# Patient Record
Sex: FEMALE | Race: WHITE | Employment: OTHER | URBAN - METROPOLITAN AREA
[De-identification: names, ages, dates, MRNs, and addresses within clinical notes are randomized per-mention and may not be internally consistent; named-entity substitution may affect disease eponyms.]

---

## 2019-04-01 ENCOUNTER — APPOINTMENT (OUTPATIENT)
Dept: CT IMAGING | Age: 64
End: 2019-04-01
Payer: COMMERCIAL

## 2019-04-01 ENCOUNTER — HOSPITAL ENCOUNTER (EMERGENCY)
Age: 64
Discharge: HOME | End: 2019-04-01
Attending: EMERGENCY MEDICINE
Payer: COMMERCIAL

## 2019-04-01 ENCOUNTER — APPOINTMENT (OUTPATIENT)
Dept: GENERAL RADIOLOGY | Age: 64
End: 2019-04-01
Payer: COMMERCIAL

## 2019-04-01 VITALS
RESPIRATION RATE: 18 BRPM | SYSTOLIC BLOOD PRESSURE: 127 MMHG | BODY MASS INDEX: 21.05 KG/M2 | TEMPERATURE: 98.3 F | OXYGEN SATURATION: 97 % | DIASTOLIC BLOOD PRESSURE: 72 MMHG | HEART RATE: 98 BPM | WEIGHT: 131 LBS | HEIGHT: 66 IN

## 2019-04-01 DIAGNOSIS — S52.601A CLOSED FRACTURE OF DISTAL ENDS OF RIGHT RADIUS AND ULNA, INITIAL ENCOUNTER: ICD-10-CM
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
## 2019-04-01 DIAGNOSIS — S09.90XA CLOSED HEAD INJURY, INITIAL ENCOUNTER: ICD-10-CM

## 2019-04-01 DIAGNOSIS — S52.501A CLOSED FRACTURE OF DISTAL ENDS OF RIGHT RADIUS AND ULNA, INITIAL ENCOUNTER: ICD-10-CM

## 2019-04-01 PROCEDURE — 99284 EMERGENCY DEPT VISIT MOD MDM: CPT

## 2019-04-01 PROCEDURE — 73110 X-RAY EXAM OF WRIST: CPT

## 2019-04-01 PROCEDURE — 70450 CT HEAD/BRAIN W/O DYE: CPT

## 2019-04-01 PROCEDURE — 29125 APPL SHORT ARM SPLINT STATIC: CPT

## 2019-04-01 PROCEDURE — 6370000000 HC RX 637 (ALT 250 FOR IP): Performed by: EMERGENCY MEDICINE

## 2019-04-01 RX ORDER — AMITRIPTYLINE HYDROCHLORIDE 25 MG/1
TABLET, FILM COATED ORAL
COMMUNITY

## 2019-04-01 RX ORDER — CHLORAL HYDRATE 500 MG
CAPSULE ORAL
COMMUNITY

## 2019-04-01 RX ORDER — HYDROCODONE BITARTRATE AND ACETAMINOPHEN 5; 325 MG/1; MG/1
1 TABLET ORAL EVERY 6 HOURS PRN
Qty: 15 TABLET | Refills: 0 | Status: SHIPPED | OUTPATIENT
Start: 2019-04-01 | End: 2019-04-06

## 2019-04-01 RX ORDER — UBIDECARENONE 75 MG
CAPSULE ORAL
COMMUNITY

## 2019-04-01 RX ORDER — HYDROCODONE BITARTRATE AND ACETAMINOPHEN 5; 325 MG/1; MG/1
1 TABLET ORAL ONCE
Status: COMPLETED | OUTPATIENT
Start: 2019-04-01 | End: 2019-04-01

## 2019-04-01 RX ORDER — BUPROPION HYDROCHLORIDE 300 MG/1
TABLET ORAL
COMMUNITY

## 2019-04-01 RX ORDER — VENLAFAXINE HYDROCHLORIDE 150 MG/1
CAPSULE, EXTENDED RELEASE ORAL
COMMUNITY

## 2019-04-01 RX ORDER — DOCUSATE SODIUM 100 MG/1
CAPSULE, LIQUID FILLED ORAL
COMMUNITY

## 2019-04-01 RX ADMIN — HYDROCODONE BITARTRATE AND ACETAMINOPHEN 1 TABLET: 5; 325 TABLET ORAL at 10:19

## 2019-04-01 SDOH — HEALTH STABILITY: MENTAL HEALTH: HOW OFTEN DO YOU HAVE A DRINK CONTAINING ALCOHOL?: NEVER

## 2019-04-01 ASSESSMENT — PAIN DESCRIPTION - LOCATION
LOCATION: HAND
LOCATION: HEAD;WRIST

## 2019-04-01 ASSESSMENT — ENCOUNTER SYMPTOMS
VOICE CHANGE: 0
ABDOMINAL PAIN: 0
EYE PAIN: 0
CHOKING: 0
SINUS PRESSURE: 0
CHEST TIGHTNESS: 0
FACIAL SWELLING: 0
STRIDOR: 0
EYE REDNESS: 0
WHEEZING: 0
BLOOD IN STOOL: 0
EYE DISCHARGE: 0
DIARRHEA: 0
BACK PAIN: 0
VOMITING: 0
SHORTNESS OF BREATH: 0
CONSTIPATION: 0
COUGH: 0
TROUBLE SWALLOWING: 0
SORE THROAT: 0

## 2019-04-01 ASSESSMENT — PAIN SCALES - GENERAL
PAINLEVEL_OUTOF10: 9
PAINLEVEL_OUTOF10: 9
PAINLEVEL_OUTOF10: 4

## 2019-04-01 ASSESSMENT — PAIN DESCRIPTION - PAIN TYPE
TYPE: ACUTE PAIN
TYPE: ACUTE PAIN

## 2019-04-01 ASSESSMENT — PAIN DESCRIPTION - ONSET: ONSET: ON-GOING

## 2019-04-01 ASSESSMENT — PAIN DESCRIPTION - ORIENTATION: ORIENTATION: RIGHT

## 2019-04-01 ASSESSMENT — PAIN DESCRIPTION - DESCRIPTORS: DESCRIPTORS: CONSTANT;ACHING;THROBBING

## 2019-04-01 NOTE — ED PROVIDER NOTES
[facility] ED  eMERGENCY dEPARTMENT eNCOUnter      Pt Name: Mami Rodney  MRN: [id number]  [place] 1955  Date of evaluation: 4/1/2019  Provider: Dia Banegas MD    [address] [place] [address]       Chief Complaint   Patient presents with    Fall     Feel Saturday night in the shower, head injury and right wrist injury, no loc         HISTORY OF PRESENT ILLNESS   (Location/Symptom, Timing/Onset,Context/Setting, Quality, Duration, Modifying Factors, Severity)  Note limiting factors. Mami Rodney is a 58 y.o. 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 REVIEW OF SYSTEMS    (2-9 systems for level 4, 10 or more for level 5)     Review of Systems   Constitutional: Negative. Negative for activity change and fever. HENT: Negative for congestion, drooling, facial swelling, mouth sores, nosebleeds, sinus pressure, sore throat, trouble swallowing and voice change. Eyes: Negative for pain, discharge, redness and visual disturbance. Respiratory: Negative for cough, choking, chest tightness, shortness of breath, wheezing and stridor. Cardiovascular: Negative for chest pain, palpitations and leg swelling. Gastrointestinal: Negative for abdominal pain, blood in stool, constipation, diarrhea and vomiting. Endocrine: Negative for cold intolerance, polyphagia and polyuria. Genitourinary: Negative for dysuria, flank pain, frequency, genital sores and urgency. Musculoskeletal: Positive for arthralgias and joint swelling. Negative for back pain, neck pain and neck stiffness. Skin: Negative for pallor and rash. Neurological: Positive for headaches. Negative for tremors, seizures, syncope, weakness and numbness. Hematological: Negative for adenopathy. Does not bruise/bleed easily. Psychiatric/Behavioral: Negative for agitation, behavioral problems, hallucinations and sleep disturbance. The patient is not hyperactive. All other systems reviewed and are negative. Except as noted above the remainder of the review of systems was reviewed and negative. PAST MEDICAL HISTORY     Past Medical History:   Diagnosis Date    Anemia     Depression     Headache     Hypertension     Osteoarthritis          SURGICALHISTORY       Past Surgical History:   Procedure Laterality Date    HYSTERECTOMY      RECTAL SURGERY           CURRENT MEDICATIONS       Previous Medications    AMITRIPTYLINE (ELAVIL) 25 MG TABLET        BUPROPION (WELLBUTRIN XL) 300 MG EXTENDED RELEASE TABLET        CALCIUM CARBONATE-VIT D-MIN (CALCIUM 1200 PO)        DOCUSATE SODIUM (STOOL SOFTENER) 100 MG CAPSULE        IRON COMBINATIONS (IRON COMPLEX PO)        MULTIPLE VITAMIN (MULTIVITAMINS PO)        OMEGA-3 1000 MG CAPS        VENLAFAXINE (EFFEXOR XR) 150 MG EXTENDED RELEASE CAPSULE        VITAMIN B-12 (CYANOCOBALAMIN) 100 MCG TABLET           ALLERGIES     Remeron [mirtazapine]    FAMILY HISTORY     History reviewed. No pertinent family history.        SOCIAL HISTORY       Social History     Socioeconomic History    Marital status:      Spouse name: None    Number of children: None    Years of education: None    Highest education level: None   Occupational History    None   Social Needs    Financial resource strain: None    Food insecurity:     Worry: None     Inability: None    Transportation needs:     Medical: None     Non-medical: None   Tobacco Use    Smoking status: Never Smoker    Smokeless tobacco: Never Used   Substance and Sexual Activity    Alcohol use: Never     Frequency: Never    Drug use: Never    Sexual activity: None   Lifestyle    Physical activity:     Days per week: None     Minutes per session: None    Stress: None   Relationships    Social connections:     Talks on phone: None     Gets together: None     Attends Quaker service: None     Active member of club or organization: None     Attends meetings of clubs or organizations: None     Relationship status: None    Intimate partner violence:     Fear of current or ex partner: None     Emotionally abused: None     Physically abused: None     Forced sexual activity: None   Other Topics Concern    None   Social History Narrative    None       SCREENINGS      @FLOW([id number])@      PHYSICAL EXAM    (up to 7 for level 4, 8 or more for level 5)     ED Triage Vitals [04/01/19 1001]   BP Temp Temp Source Pulse Resp SpO2 Height Weight   127/72 98.3 °F (36.8 °C) Oral 98 18 97 % 5' 6\" (1.676 m) 131 lb (59.4 kg)       Physical Exam   Constitutional: She is oriented to person, place, and time. She appears well-developed. Active alert cooperative patient slightly uncomfortable because of the face swelling ambulatory otherwise   HENT:   Head: Normocephalic. Right Ear: External ear normal.   Left Ear: External ear normal.   Mouth/Throat: Oropharynx is clear and moist.   Attention to the scalp patient has no hematoma no bruises or laceration to the scalp but patient has minimal tenderness on palpation to the occiput area   Eyes: Pupils are equal, round, and reactive to light. Conjunctivae and EOM are normal.   Neck: Normal range of motion. Neck supple. No JVD present. No tracheal deviation present. No thyromegaly present.    Attention  to the neck patient has excellent range of motion of the neck no hematoma no bruises Cardiovascular: Normal rate and normal heart sounds. Exam reveals no gallop. No murmur heard. Pulmonary/Chest: Breath sounds normal. No respiratory distress. She has no wheezes. Abdominal: Soft. Bowel sounds are normal. She exhibits no mass. There is no rebound. Musculoskeletal: Normal range of motion. She exhibits no edema or tenderness. Lymphadenopathy:     She has no cervical adenopathy. Neurological: She is alert and oriented to person, place, and time. No cranial nerve deficit. She exhibits normal muscle tone. Skin: Skin is warm. No rash noted. No erythema. Psychiatric: Her behavior is normal. Thought content normal.   Nursing note and vitals reviewed. DIAGNOSTIC RESULTS     EKG: All EKG's are interpreted by the Emergency Department Physician who either signs or Co-signsthis chart in the absence of a cardiologist.        RADIOLOGY:   Jere Bounds such as CT, Ultrasound and MRI are read by the radiologist. Darlyn Feldman radiographic images are visualized and preliminarily interpreted by the emergency physician with the below findings:        Interpretation per the Radiologist below, if available at the time ofthis note:    CT Head WO Contrast   Final Result   No acute hemorrhage or extra-axial fluid collection seen on this examination. All CT scans at this facility use dose modulation, iterative reconstruction, and/or weight based dosing when appropriate to reduce radiation dose to as low as reasonably achievable. XR WRIST RIGHT (MIN 3 VIEWS)    (Results Pending)         ED BEDSIDE ULTRASOUND:   Performed by ED Physician - none    LABS:  Labs Reviewed - No data to display    All other labs were within normal range or not returned as of this dictation.     EMERGENCY DEPARTMENT COURSE and DIFFERENTIAL DIAGNOSIS/MDM:   Vitals:    Vitals:    04/01/19 1001   BP: 127/72   Pulse: 98   Resp: 18   Temp: 98.3 °F (36.8 °C)   TempSrc: Oral   SpO2: 97%   Weight: 131 lb (59.4 kg) Height: 5' 6\" (1.676 m)           MDM    CRITICAL CARE TIME   Total Critical Care time was minutes, excluding separately reportableprocedures. There was a high probability of clinicallysignificant/life threatening deterioration in the patient's condition which required my urgent intervention. CONSULTS:  None    PROCEDURES:  Unless otherwise noted below, none     Procedures    FINAL IMPRESSION      1. Fall, initial encounter    2. Closed head injury, initial encounter    3. Closed fracture of distal ends of right radius and ulna, initial encounter          DISPOSITION/PLAN   DISPOSITION Decision To Discharge 04/01/2019 11:12:44 AM      PATIENT REFERRED TO:  Morgan Boland MD  [place] [id number]    In 1 week      Zaynab Garcia MD  [address] [facility] [address] [phone]    In 2 days        DISCHARGE MEDICATIONS:  New Prescriptions    HYDROCODONE-ACETAMINOPHEN (NORCO) 5-325 MG PER TABLET    Take 1 tablet by mouth every 6 hours as needed for Pain for up to 5 days.           (Please note that portions of this note were completed with a voice recognition program.  Efforts were made to edit the dictations but occasionally words are mis-transcribed.)    Luigi Bess MD (electronically signed)  Attending Emergency Physician       Luigi Bess MD  04/01/19 2197

## 2023-02-14 NOTE — ED TRIAGE NOTES
Pt c/o pain to back of head, lower right side and right wrist after falling in the shower on Saturday night. Pt c/o HA and pain to right wrist.  Pain and swelling to right wrist, unable to bend a wrist, able to move fingers, cap refill brisk. No loc when she fell. Nothing taken for pain today. Tazorac Pregnancy And Lactation Text: This medication is not safe during pregnancy. It is unknown if this medication is excreted in breast milk. Minocycline Counseling: Patient advised regarding possible photosensitivity and discoloration of the teeth, skin, lips, tongue and gums.  Patient instructed to avoid sunlight, if possible.  When exposed to sunlight, patients should wear protective clothing, sunglasses, and sunscreen.  The patient was instructed to call the office immediately if the following severe adverse effects occur:  hearing changes, easy bruising/bleeding, severe headache, or vision changes.  The patient verbalized understanding of the proper use and possible adverse effects of minocycline.  All of the patient's questions and concerns were addressed. Bactrim Counseling:  I discussed with the patient the risks of sulfa antibiotics including but not limited to GI upset, allergic reaction, drug rash, diarrhea, dizziness, photosensitivity, and yeast infections.  Rarely, more serious reactions can occur including but not limited to aplastic anemia, agranulocytosis, methemoglobinemia, blood dyscrasias, liver or kidney failure, lung infiltrates or desquamative/blistering drug rashes. Bactrim Pregnancy And Lactation Text: This medication is Pregnancy Category D and is known to cause fetal risk.  It is also excreted in breast milk. Topical Clindamycin Pregnancy And Lactation Text: This medication is Pregnancy Category B and is considered safe during pregnancy. It is unknown if it is excreted in breast milk. Sarecycline Counseling: Patient advised regarding possible photosensitivity and discoloration of the teeth, skin, lips, tongue and gums.  Patient instructed to avoid sunlight, if possible.  When exposed to sunlight, patients should wear protective clothing, sunglasses, and sunscreen.  The patient was instructed to call the office immediately if the following severe adverse effects occur:  hearing changes, easy bruising/bleeding, severe headache, or vision changes.  The patient verbalized understanding of the proper use and possible adverse effects of sarecycline.  All of the patient's questions and concerns were addressed. Detail Level: Detailed Isotretinoin Counseling: Patient should get monthly blood tests, not donate blood, not drive at night if vision affected, not share medication, and not undergo elective surgery for 6 months after tx completed. Side effects reviewed, pt to contact office should one occur. High Dose Vitamin A Counseling: Side effects reviewed, pt to contact office should one occur. Topical Retinoid Pregnancy And Lactation Text: This medication is Pregnancy Category C. It is unknown if this medication is excreted in breast milk. Use Enhanced Medication Counseling?: No Azithromycin Counseling:  I discussed with the patient the risks of azithromycin including but not limited to GI upset, allergic reaction, drug rash, diarrhea, and yeast infections. Dapsone Counseling: I discussed with the patient the risks of dapsone including but not limited to hemolytic anemia, agranulocytosis, rashes, methemoglobinemia, kidney failure, peripheral neuropathy, headaches, GI upset, and liver toxicity.  Patients who start dapsone require monitoring including baseline LFTs and weekly CBCs for the first month, then every month thereafter.  The patient verbalized understanding of the proper use and possible adverse effects of dapsone.  All of the patient's questions and concerns were addressed. Erythromycin Counseling:  I discussed with the patient the risks of erythromycin including but not limited to GI upset, allergic reaction, drug rash, diarrhea, increase in liver enzymes, and yeast infections. Azelaic Acid Pregnancy And Lactation Text: This medication is considered safe during pregnancy and breast feeding. Doxycycline Counseling:  Patient counseled regarding possible photosensitivity and increased risk for sunburn.  Patient instructed to avoid sunlight, if possible.  When exposed to sunlight, patients should wear protective clothing, sunglasses, and sunscreen.  The patient was instructed to call the office immediately if the following severe adverse effects occur:  hearing changes, easy bruising/bleeding, severe headache, or vision changes.  The patient verbalized understanding of the proper use and possible adverse effects of doxycycline.  All of the patient's questions and concerns were addressed. Aklief Pregnancy And Lactation Text: It is unknown if this medication is safe to use during pregnancy.  It is unknown if this medication is excreted in breast milk.  Breastfeeding women should use the topical cream on the smallest area of the skin for the shortest time needed while breastfeeding.  Do not apply to nipple and areola. Benzoyl Peroxide Pregnancy And Lactation Text: This medication is Pregnancy Category C. It is unknown if benzoyl peroxide is excreted in breast milk. Winlevi Counseling:  I discussed with the patient the risks of topical clascoterone including but not limited to erythema, scaling, itching, and stinging. Patient voiced their understanding. Birth Control Pills Counseling: Birth Control Pill Counseling: I discussed with the patient the potential side effects of OCPs including but not limited to increased risk of stroke, heart attack, thrombophlebitis, deep venous thrombosis, hepatic adenomas, breast changes, GI upset, headaches, and depression.  The patient verbalized understanding of the proper use and possible adverse effects of OCPs. All of the patient's questions and concerns were addressed. Spironolactone Pregnancy And Lactation Text: This medication can cause feminization of the male fetus and should be avoided during pregnancy. The active metabolite is also found in breast milk. Tetracycline Pregnancy And Lactation Text: This medication is Pregnancy Category D and not consider safe during pregnancy. It is also excreted in breast milk. Tazorac Counseling:  Patient advised that medication is irritating and drying.  Patient may need to apply sparingly and wash off after an hour before eventually leaving it on overnight.  The patient verbalized understanding of the proper use and possible adverse effects of tazorac.  All of the patient's questions and concerns were addressed. Topical Clindamycin Counseling: Patient counseled that this medication may cause skin irritation or allergic reactions.  In the event of skin irritation, the patient was advised to reduce the amount of the drug applied or use it less frequently.   The patient verbalized understanding of the proper use and possible adverse effects of clindamycin.  All of the patient's questions and concerns were addressed. Topical Sulfur Applications Counseling: Topical Sulfur Counseling: Patient counseled that this medication may cause skin irritation or allergic reactions.  In the event of skin irritation, the patient was advised to reduce the amount of the drug applied or use it less frequently.   The patient verbalized understanding of the proper use and possible adverse effects of topical sulfur application.  All of the patient's questions and concerns were addressed. Azithromycin Pregnancy And Lactation Text: This medication is considered safe during pregnancy and is also secreted in breast milk. Erythromycin Pregnancy And Lactation Text: This medication is Pregnancy Category B and is considered safe during pregnancy. It is also excreted in breast milk. Benzoyl Peroxide Counseling: Patient counseled that medicine may cause skin irritation and bleach clothing.  In the event of skin irritation, the patient was advised to reduce the amount of the drug applied or use it less frequently.   The patient verbalized understanding of the proper use and possible adverse effects of benzoyl peroxide.  All of the patient's questions and concerns were addressed. Topical Retinoid counseling:  Patient advised to apply a pea-sized amount only at bedtime and wait 30 minutes after washing their face before applying.  If too drying, patient may add a non-comedogenic moisturizer. The patient verbalized understanding of the proper use and possible adverse effects of retinoids.  All of the patient's questions and concerns were addressed. High Dose Vitamin A Pregnancy And Lactation Text: High dose vitamin A therapy is contraindicated during pregnancy and breast feeding. Isotretinoin Pregnancy And Lactation Text: This medication is Pregnancy Category X and is considered extremely dangerous during pregnancy. It is unknown if it is excreted in breast milk. Azelaic Acid Counseling: Patient counseled that medicine may cause skin irritation and to avoid applying near the eyes.  In the event of skin irritation, the patient was advised to reduce the amount of the drug applied or use it less frequently.   The patient verbalized understanding of the proper use and possible adverse effects of azelaic acid.  All of the patient's questions and concerns were addressed. Doxycycline Pregnancy And Lactation Text: This medication is Pregnancy Category D and not consider safe during pregnancy. It is also excreted in breast milk but is considered safe for shorter treatment courses. Birth Control Pills Pregnancy And Lactation Text: This medication should be avoided if pregnant and for the first 30 days post-partum. Spironolactone Counseling: Patient advised regarding risks of diarrhea, abdominal pain, hyperkalemia, birth defects (for female patients), liver toxicity and renal toxicity. The patient may need blood work to monitor liver and kidney function and potassium levels while on therapy. The patient verbalized understanding of the proper use and possible adverse effects of spironolactone.  All of the patient's questions and concerns were addressed. Tetracycline Counseling: Patient counseled regarding possible photosensitivity and increased risk for sunburn.  Patient instructed to avoid sunlight, if possible.  When exposed to sunlight, patients should wear protective clothing, sunglasses, and sunscreen.  The patient was instructed to call the office immediately if the following severe adverse effects occur:  hearing changes, easy bruising/bleeding, severe headache, or vision changes.  The patient verbalized understanding of the proper use and possible adverse effects of tetracycline.  All of the patient's questions and concerns were addressed. Patient understands to avoid pregnancy while on therapy due to potential birth defects. Topical Sulfur Applications Pregnancy And Lactation Text: This medication is Pregnancy Category C and has an unknown safety profile during pregnancy. It is unknown if this topical medication is excreted in breast milk. Dapsone Pregnancy And Lactation Text: This medication is Pregnancy Category C and is not considered safe during pregnancy or breast feeding. Winlevi Pregnancy And Lactation Text: This medication is considered safe during pregnancy and breastfeeding. Aklief counseling:  Patient advised to apply a pea-sized amount only at bedtime and wait 30 minutes after washing their face before applying.  If too drying, patient may add a non-comedogenic moisturizer.  The most commonly reported side effects including irritation, redness, scaling, dryness, stinging, burning, itching, and increased risk of sunburn.  The patient verbalized understanding of the proper use and possible adverse effects of retinoids.  All of the patient's questions and concerns were addressed.

## 2023-04-08 PROBLEM — S06.33AA: Status: ACTIVE | Noted: 2023-04-08

## 2023-04-08 PROBLEM — S06.5XAA SDH (SUBDURAL HEMATOMA) (HCC): Status: ACTIVE | Noted: 2023-04-08

## 2023-04-08 PROBLEM — S51.011A LACERATION OF RIGHT ELBOW: Status: ACTIVE | Noted: 2023-04-08

## 2023-04-08 PROBLEM — I60.9 SAH (SUBARACHNOID HEMORRHAGE) (HCC): Status: ACTIVE | Noted: 2023-04-08

## 2023-04-09 DIAGNOSIS — S06.33AA: Primary | ICD-10-CM

## 2023-04-10 PROBLEM — R94.31 QT PROLONGATION: Status: ACTIVE | Noted: 2023-04-10

## 2023-04-10 PROBLEM — R11.2 NAUSEA AND VOMITING: Status: ACTIVE | Noted: 2023-04-10

## 2023-04-10 PROBLEM — S09.90XA HEAD INJURY: Status: ACTIVE | Noted: 2023-04-10

## 2023-04-10 PROBLEM — R42 DIZZINESS: Status: ACTIVE | Noted: 2023-04-10

## 2023-04-10 PROBLEM — G89.11 ACUTE PAIN DUE TO TRAUMA: Status: ACTIVE | Noted: 2023-04-10

## 2023-04-11 PROBLEM — R27.0 ATAXIA: Status: ACTIVE | Noted: 2023-04-11

## 2023-04-11 PROBLEM — H81.4 CENTRAL VESTIBULAR VERTIGO: Status: ACTIVE | Noted: 2023-04-11

## 2023-04-12 ENCOUNTER — HOSPITAL ENCOUNTER (INPATIENT)
Age: 68
LOS: 12 days | Discharge: HOME HEALTH | DRG: 950 | End: 2023-04-24
Attending: INTERNAL MEDICINE | Admitting: INTERNAL MEDICINE
Payer: MEDICARE

## 2023-04-12 DIAGNOSIS — S06.5XAA SUBDURAL HEMATOMA (HCC): ICD-10-CM

## 2023-04-12 DIAGNOSIS — S06.33AA: Primary | ICD-10-CM

## 2023-04-12 PROBLEM — D62 ACUTE BLOOD LOSS ANEMIA: Status: ACTIVE | Noted: 2023-04-12
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
## 2023-04-12 PROCEDURE — 6370000000 HC RX 637 (ALT 250 FOR IP): Performed by: PHYSICIAN ASSISTANT

## 2023-04-12 PROCEDURE — 1200000002 HC SEMI PRIVATE SWING BED

## 2023-04-12 RX ORDER — LISINOPRIL 5 MG/1
5 TABLET ORAL DAILY
Status: DISCONTINUED | OUTPATIENT
Start: 2023-04-12 | End: 2023-04-24 | Stop reason: HOSPADM

## 2023-04-12 RX ORDER — CALCIUM CARBONATE 200(500)MG
2 TABLET,CHEWABLE ORAL DAILY
Status: DISCONTINUED | OUTPATIENT
Start: 2023-04-12 | End: 2023-04-24 | Stop reason: HOSPADM

## 2023-04-12 RX ORDER — AMITRIPTYLINE HYDROCHLORIDE 25 MG/1
25 TABLET, FILM COATED ORAL NIGHTLY
Status: DISCONTINUED | OUTPATIENT
Start: 2023-04-12 | End: 2023-04-17

## 2023-04-12 RX ORDER — POLYETHYLENE GLYCOL 3350 17 G/17G
17 POWDER, FOR SOLUTION ORAL DAILY PRN
Status: DISCONTINUED | OUTPATIENT
Start: 2023-04-12 | End: 2023-04-24 | Stop reason: HOSPADM

## 2023-04-12 RX ORDER — MECLIZINE HCL 12.5 MG/1
25 TABLET ORAL 3 TIMES DAILY PRN
Status: DISCONTINUED | OUTPATIENT
Start: 2023-04-12 | End: 2023-04-13

## 2023-04-12 RX ORDER — BUPROPION HYDROCHLORIDE 150 MG/1
300 TABLET ORAL EVERY MORNING
Status: DISCONTINUED | OUTPATIENT
Start: 2023-04-13 | End: 2023-04-24 | Stop reason: HOSPADM

## 2023-04-12 RX ORDER — MULTIVITAMIN WITH IRON
1 TABLET ORAL DAILY
Status: DISCONTINUED | OUTPATIENT
Start: 2023-04-12 | End: 2023-04-24 | Stop reason: HOSPADM

## 2023-04-12 RX ORDER — ONDANSETRON 4 MG/1
4 TABLET, ORALLY DISINTEGRATING ORAL EVERY 8 HOURS PRN
Status: DISCONTINUED | OUTPATIENT
Start: 2023-04-12 | End: 2023-04-24 | Stop reason: HOSPADM

## 2023-04-12 RX ORDER — ACETAMINOPHEN 325 MG/1
650 TABLET ORAL EVERY 6 HOURS PRN
Status: DISCONTINUED | OUTPATIENT
Start: 2023-04-12 | End: 2023-04-24 | Stop reason: HOSPADM

## 2023-04-12 RX ORDER — ACETAMINOPHEN 325 MG/1
650 TABLET ORAL EVERY 6 HOURS PRN
Status: DISCONTINUED | OUTPATIENT
Start: 2023-04-12 | End: 2023-04-12 | Stop reason: SDUPTHER

## 2023-04-12 RX ORDER — ONDANSETRON 2 MG/ML
4 INJECTION INTRAMUSCULAR; INTRAVENOUS EVERY 6 HOURS PRN
Status: DISCONTINUED | OUTPATIENT
Start: 2023-04-12 | End: 2023-04-24 | Stop reason: HOSPADM

## 2023-04-12 RX ORDER — ATORVASTATIN CALCIUM 10 MG/1
20 TABLET, FILM COATED ORAL NIGHTLY
Status: DISCONTINUED | OUTPATIENT
Start: 2023-04-12 | End: 2023-04-24 | Stop reason: HOSPADM

## 2023-04-12 RX ORDER — ACETAMINOPHEN 650 MG/1
650 SUPPOSITORY RECTAL EVERY 6 HOURS PRN
Status: DISCONTINUED | OUTPATIENT
Start: 2023-04-12 | End: 2023-04-24 | Stop reason: HOSPADM

## 2023-04-12 RX ORDER — ACETAMINOPHEN 325 MG/1
650 TABLET ORAL EVERY 6 HOURS
Status: DISCONTINUED | OUTPATIENT
Start: 2023-04-13 | End: 2023-04-12

## 2023-04-12 RX ORDER — POLYETHYLENE GLYCOL 3350 17 G/17G
17 POWDER, FOR SOLUTION ORAL DAILY
Status: DISCONTINUED | OUTPATIENT
Start: 2023-04-12 | End: 2023-04-24 | Stop reason: HOSPADM

## 2023-04-12 RX ORDER — SODIUM CHLORIDE 9 MG/ML
INJECTION, SOLUTION INTRAVENOUS PRN
Status: DISCONTINUED | OUTPATIENT
Start: 2023-04-12 | End: 2023-04-24 | Stop reason: HOSPADM

## 2023-04-12 RX ORDER — SENNA AND DOCUSATE SODIUM 50; 8.6 MG/1; MG/1
1 TABLET, FILM COATED ORAL 2 TIMES DAILY
Status: DISCONTINUED | OUTPATIENT
Start: 2023-04-12 | End: 2023-04-24 | Stop reason: HOSPADM

## 2023-04-12 RX ADMIN — AMITRIPTYLINE HYDROCHLORIDE 25 MG: 25 TABLET, FILM COATED ORAL at 21:34

## 2023-04-12 RX ADMIN — ATORVASTATIN CALCIUM 20 MG: 10 TABLET, FILM COATED ORAL at 21:34

## 2023-04-12 RX ADMIN — SENNOSIDES AND DOCUSATE SODIUM 1 TABLET: 50; 8.6 TABLET ORAL at 21:34

## 2023-04-12 RX ADMIN — LEVETIRACETAM 750 MG: 500 TABLET, FILM COATED ORAL at 21:39

## 2023-04-12 ASSESSMENT — PAIN DESCRIPTION - FREQUENCY: FREQUENCY: CONTINUOUS

## 2023-04-12 ASSESSMENT — PAIN DESCRIPTION - ORIENTATION: ORIENTATION: RIGHT;POSTERIOR

## 2023-04-12 ASSESSMENT — PAIN SCALES - GENERAL: PAINLEVEL_OUTOF10: 6

## 2023-04-12 ASSESSMENT — PAIN - FUNCTIONAL ASSESSMENT: PAIN_FUNCTIONAL_ASSESSMENT: PREVENTS OR INTERFERES SOME ACTIVE ACTIVITIES AND ADLS

## 2023-04-12 ASSESSMENT — PAIN DESCRIPTION - LOCATION: LOCATION: HEAD

## 2023-04-12 ASSESSMENT — PAIN DESCRIPTION - DESCRIPTORS: DESCRIPTORS: ACHING

## 2023-04-12 ASSESSMENT — PAIN DESCRIPTION - ONSET: ONSET: ON-GOING

## 2023-04-13 LAB
ANION GAP SERPL CALCULATED.3IONS-SCNC: 11 MEQ/L (ref 9–15)
BASOPHILS # BLD: 0 K/UL (ref 0–0.1)
BASOPHILS NFR BLD: 0.3 % (ref 0.1–1.2)
BUN SERPL-MCNC: 25 MG/DL (ref 8–23)
CALCIUM SERPL-MCNC: 8.8 MG/DL (ref 8.5–9.9)
CHLORIDE SERPL-SCNC: 102 MEQ/L (ref 95–107)
CO2 SERPL-SCNC: 25 MEQ/L (ref 20–31)
CREAT SERPL-MCNC: 0.53 MG/DL (ref 0.5–0.9)
EOSINOPHIL # BLD: 0.1 K/UL (ref 0–0.4)
EOSINOPHIL NFR BLD: 0.8 % (ref 0.7–5.8)
ERYTHROCYTE [DISTWIDTH] IN BLOOD BY AUTOMATED COUNT: 12.8 % (ref 11.7–14.4)
GLUCOSE SERPL-MCNC: 115 MG/DL (ref 70–99)
HCT VFR BLD AUTO: 37.6 % (ref 37–47)
HGB BLD-MCNC: 12.5 G/DL (ref 11.2–15.7)
IMM GRANULOCYTES # BLD: 0 K/UL
IMM GRANULOCYTES NFR BLD: 0.3 %
LYMPHOCYTES # BLD: 1 K/UL (ref 1.2–3.7)
LYMPHOCYTES NFR BLD: 9.7 %
MCH RBC QN AUTO: 29.8 PG (ref 25.6–32.2)
MCHC RBC AUTO-ENTMCNC: 33.2 % (ref 32.2–35.5)
MCV RBC AUTO: 89.5 FL (ref 79.4–94.8)
MONOCYTES # BLD: 1.2 K/UL (ref 0.2–0.9)
MONOCYTES NFR BLD: 11.1 % (ref 4.7–12.5)
NEUTROPHILS # BLD: 8.3 K/UL (ref 1.6–6.1)
NEUTS SEG NFR BLD: 77.8 % (ref 34–71.1)
PLATELET # BLD AUTO: 207 K/UL (ref 182–369)
POTASSIUM SERPL-SCNC: 3.9 MEQ/L (ref 3.4–4.9)
RBC # BLD AUTO: 4.2 M/UL (ref 3.93–5.22)
SODIUM SERPL-SCNC: 138 MEQ/L (ref 135–144)
WBC # BLD AUTO: 10.6 K/UL (ref 4–10)

## 2023-04-13 PROCEDURE — 1200000002 HC SEMI PRIVATE SWING BED

## 2023-04-13 PROCEDURE — 97162 PT EVAL MOD COMPLEX 30 MIN: CPT

## 2023-04-13 PROCEDURE — 6370000000 HC RX 637 (ALT 250 FOR IP): Performed by: INTERNAL MEDICINE

## 2023-04-13 PROCEDURE — 6370000000 HC RX 637 (ALT 250 FOR IP): Performed by: PHYSICIAN ASSISTANT

## 2023-04-13 PROCEDURE — 85025 COMPLETE CBC W/AUTO DIFF WBC: CPT

## 2023-04-13 PROCEDURE — 36415 COLL VENOUS BLD VENIPUNCTURE: CPT

## 2023-04-13 PROCEDURE — 97116 GAIT TRAINING THERAPY: CPT

## 2023-04-13 PROCEDURE — 6360000002 HC RX W HCPCS: Performed by: PHYSICIAN ASSISTANT

## 2023-04-13 PROCEDURE — 97530 THERAPEUTIC ACTIVITIES: CPT

## 2023-04-13 PROCEDURE — 97166 OT EVAL MOD COMPLEX 45 MIN: CPT

## 2023-04-13 PROCEDURE — 80048 BASIC METABOLIC PNL TOTAL CA: CPT

## 2023-04-13 RX ORDER — SCOLOPAMINE TRANSDERMAL SYSTEM 1 MG/1
1 PATCH, EXTENDED RELEASE TRANSDERMAL
Status: DISCONTINUED | OUTPATIENT
Start: 2023-04-13 | End: 2023-04-19

## 2023-04-13 RX ORDER — NITROFURANTOIN 25; 75 MG/1; MG/1
100 CAPSULE ORAL EVERY 12 HOURS SCHEDULED
Status: DISCONTINUED | OUTPATIENT
Start: 2023-04-13 | End: 2023-04-24

## 2023-04-13 RX ORDER — MECLIZINE HCL 12.5 MG/1
25 TABLET ORAL EVERY 6 HOURS SCHEDULED
Status: DISCONTINUED | OUTPATIENT
Start: 2023-04-13 | End: 2023-04-24 | Stop reason: HOSPADM

## 2023-04-13 RX ADMIN — ACETAMINOPHEN 650 MG: 325 TABLET ORAL at 06:25

## 2023-04-13 RX ADMIN — SENNOSIDES AND DOCUSATE SODIUM 1 TABLET: 50; 8.6 TABLET ORAL at 09:37

## 2023-04-13 RX ADMIN — SENNOSIDES AND DOCUSATE SODIUM 1 TABLET: 50; 8.6 TABLET ORAL at 20:23

## 2023-04-13 RX ADMIN — VENLAFAXINE HYDROCHLORIDE 225 MG: 150 CAPSULE, EXTENDED RELEASE ORAL at 09:36

## 2023-04-13 RX ADMIN — MECLIZINE 25 MG: 12.5 TABLET ORAL at 06:25

## 2023-04-13 RX ADMIN — NITROFURANTOIN MONOHYDRATE/MACROCRYSTALS 100 MG: 75; 25 CAPSULE ORAL at 20:22

## 2023-04-13 RX ADMIN — TRIMETHOBENZAMIDE HYDROCHLORIDE 200 MG: 100 INJECTION INTRAMUSCULAR at 17:03

## 2023-04-13 RX ADMIN — MECLIZINE 25 MG: 12.5 TABLET ORAL at 22:58

## 2023-04-13 RX ADMIN — AMITRIPTYLINE HYDROCHLORIDE 25 MG: 25 TABLET, FILM COATED ORAL at 20:23

## 2023-04-13 RX ADMIN — NITROFURANTOIN MONOHYDRATE/MACROCRYSTALS 100 MG: 75; 25 CAPSULE ORAL at 09:38

## 2023-04-13 RX ADMIN — MULTIVITAMIN TABLET 1 TABLET: TABLET at 09:38

## 2023-04-13 RX ADMIN — TRIMETHOBENZAMIDE HYDROCHLORIDE 200 MG: 100 INJECTION INTRAMUSCULAR at 08:53

## 2023-04-13 RX ADMIN — ATORVASTATIN CALCIUM 20 MG: 10 TABLET, FILM COATED ORAL at 20:22

## 2023-04-13 RX ADMIN — ANTACID TABLETS 1000 MG: 500 TABLET, CHEWABLE ORAL at 09:37

## 2023-04-13 RX ADMIN — MECLIZINE 25 MG: 12.5 TABLET ORAL at 12:23

## 2023-04-13 RX ADMIN — LEVETIRACETAM 750 MG: 500 TABLET, FILM COATED ORAL at 09:38

## 2023-04-13 RX ADMIN — MECLIZINE 25 MG: 12.5 TABLET ORAL at 17:36

## 2023-04-13 RX ADMIN — LISINOPRIL 5 MG: 5 TABLET ORAL at 09:38

## 2023-04-13 RX ADMIN — LEVETIRACETAM 750 MG: 500 TABLET, FILM COATED ORAL at 20:23

## 2023-04-13 RX ADMIN — BUPROPION HYDROCHLORIDE 300 MG: 150 TABLET, EXTENDED RELEASE ORAL at 09:38

## 2023-04-13 ASSESSMENT — PAIN DESCRIPTION - LOCATION: LOCATION: HEAD

## 2023-04-13 ASSESSMENT — PAIN SCALES - GENERAL
PAINLEVEL_OUTOF10: 9
PAINLEVEL_OUTOF10: 0

## 2023-04-13 ASSESSMENT — PAIN DESCRIPTION - ORIENTATION: ORIENTATION: RIGHT;POSTERIOR

## 2023-04-13 ASSESSMENT — PAIN DESCRIPTION - DESCRIPTORS: DESCRIPTORS: ACHING

## 2023-04-13 ASSESSMENT — PAIN - FUNCTIONAL ASSESSMENT: PAIN_FUNCTIONAL_ASSESSMENT: PREVENTS OR INTERFERES SOME ACTIVE ACTIVITIES AND ADLS

## 2023-04-14 LAB
BACTERIA URNS QL MICRO: NEGATIVE /HPF
BILIRUB UR QL STRIP: NEGATIVE
CLARITY UR: CLEAR
COLOR UR: YELLOW
EPI CELLS #/AREA URNS HPF: NORMAL /HPF
GLUCOSE UR STRIP-MCNC: NEGATIVE MG/DL
HGB UR QL STRIP: NEGATIVE
KETONES UR STRIP-MCNC: 15 MG/DL
LEUKOCYTE ESTERASE UR QL STRIP: NEGATIVE
MUCOUS THREADS URNS QL MICRO: PRESENT /LPF
NITRITE UR QL STRIP: NEGATIVE
PH UR STRIP: 6 [PH] (ref 5–9)
PROT UR STRIP-MCNC: 30 MG/DL
RBC #/AREA URNS HPF: NORMAL /HPF (ref 0–2)
SP GR UR STRIP: >=1.03 (ref 1–1.03)
URINE REFLEX TO CULTURE: ABNORMAL
UROBILINOGEN UR STRIP-ACNC: 0.2 E.U./DL
WBC #/AREA URNS HPF: NORMAL /HPF (ref 0–5)

## 2023-04-14 PROCEDURE — 97530 THERAPEUTIC ACTIVITIES: CPT

## 2023-04-14 PROCEDURE — 6370000000 HC RX 637 (ALT 250 FOR IP): Performed by: INTERNAL MEDICINE

## 2023-04-14 PROCEDURE — 97116 GAIT TRAINING THERAPY: CPT

## 2023-04-14 PROCEDURE — 6370000000 HC RX 637 (ALT 250 FOR IP): Performed by: PHYSICIAN ASSISTANT

## 2023-04-14 PROCEDURE — 81001 URINALYSIS AUTO W/SCOPE: CPT

## 2023-04-14 PROCEDURE — 1200000002 HC SEMI PRIVATE SWING BED

## 2023-04-14 PROCEDURE — 97535 SELF CARE MNGMENT TRAINING: CPT

## 2023-04-14 RX ORDER — PROCHLORPERAZINE MALEATE 5 MG/1
5 TABLET ORAL EVERY 6 HOURS PRN
Status: DISCONTINUED | OUTPATIENT
Start: 2023-04-14 | End: 2023-04-24 | Stop reason: HOSPADM

## 2023-04-14 RX ADMIN — ATORVASTATIN CALCIUM 20 MG: 10 TABLET, FILM COATED ORAL at 20:25

## 2023-04-14 RX ADMIN — LISINOPRIL 5 MG: 5 TABLET ORAL at 09:06

## 2023-04-14 RX ADMIN — AMITRIPTYLINE HYDROCHLORIDE 25 MG: 25 TABLET, FILM COATED ORAL at 20:25

## 2023-04-14 RX ADMIN — LEVETIRACETAM 750 MG: 500 TABLET, FILM COATED ORAL at 09:06

## 2023-04-14 RX ADMIN — MECLIZINE 25 MG: 12.5 TABLET ORAL at 05:40

## 2023-04-14 RX ADMIN — VENLAFAXINE HYDROCHLORIDE 225 MG: 150 CAPSULE, EXTENDED RELEASE ORAL at 09:05

## 2023-04-14 RX ADMIN — PROCHLORPERAZINE MALEATE 5 MG: 5 TABLET ORAL at 18:05

## 2023-04-14 RX ADMIN — BUPROPION HYDROCHLORIDE 300 MG: 150 TABLET, EXTENDED RELEASE ORAL at 09:06

## 2023-04-14 RX ADMIN — LEVETIRACETAM 750 MG: 500 TABLET, FILM COATED ORAL at 20:25

## 2023-04-14 RX ADMIN — ANTACID TABLETS 1000 MG: 500 TABLET, CHEWABLE ORAL at 09:05

## 2023-04-14 RX ADMIN — SENNOSIDES AND DOCUSATE SODIUM 1 TABLET: 50; 8.6 TABLET ORAL at 09:06

## 2023-04-14 RX ADMIN — NITROFURANTOIN MONOHYDRATE/MACROCRYSTALS 100 MG: 75; 25 CAPSULE ORAL at 09:06

## 2023-04-14 RX ADMIN — MULTIVITAMIN TABLET 1 TABLET: TABLET at 09:06

## 2023-04-14 RX ADMIN — SENNOSIDES AND DOCUSATE SODIUM 1 TABLET: 50; 8.6 TABLET ORAL at 20:25

## 2023-04-14 RX ADMIN — ACETAMINOPHEN 650 MG: 325 TABLET ORAL at 05:40

## 2023-04-14 RX ADMIN — NITROFURANTOIN MONOHYDRATE/MACROCRYSTALS 100 MG: 75; 25 CAPSULE ORAL at 20:25

## 2023-04-14 RX ADMIN — ONDANSETRON 4 MG: 4 TABLET, ORALLY DISINTEGRATING ORAL at 10:49

## 2023-04-14 ASSESSMENT — PAIN DESCRIPTION - ONSET: ONSET: ON-GOING

## 2023-04-14 ASSESSMENT — PAIN DESCRIPTION - ORIENTATION: ORIENTATION: RIGHT;POSTERIOR

## 2023-04-14 ASSESSMENT — PAIN DESCRIPTION - DESCRIPTORS: DESCRIPTORS: ACHING

## 2023-04-14 ASSESSMENT — PAIN SCALES - GENERAL: PAINLEVEL_OUTOF10: 7

## 2023-04-14 ASSESSMENT — PAIN DESCRIPTION - LOCATION: LOCATION: HEAD

## 2023-04-14 ASSESSMENT — PAIN DESCRIPTION - FREQUENCY: FREQUENCY: CONTINUOUS

## 2023-04-14 ASSESSMENT — PAIN - FUNCTIONAL ASSESSMENT: PAIN_FUNCTIONAL_ASSESSMENT: PREVENTS OR INTERFERES SOME ACTIVE ACTIVITIES AND ADLS

## 2023-04-15 PROCEDURE — 6370000000 HC RX 637 (ALT 250 FOR IP): Performed by: INTERNAL MEDICINE

## 2023-04-15 PROCEDURE — 6370000000 HC RX 637 (ALT 250 FOR IP): Performed by: PHYSICIAN ASSISTANT

## 2023-04-15 PROCEDURE — 97530 THERAPEUTIC ACTIVITIES: CPT

## 2023-04-15 PROCEDURE — 1200000002 HC SEMI PRIVATE SWING BED

## 2023-04-15 RX ORDER — OXYCODONE HYDROCHLORIDE 5 MG/1
5 TABLET ORAL EVERY 6 HOURS PRN
Status: DISCONTINUED | OUTPATIENT
Start: 2023-04-15 | End: 2023-04-24 | Stop reason: HOSPADM

## 2023-04-15 RX ADMIN — OXYCODONE 5 MG: 5 TABLET ORAL at 20:36

## 2023-04-15 RX ADMIN — OXYCODONE 5 MG: 5 TABLET ORAL at 14:37

## 2023-04-15 RX ADMIN — NITROFURANTOIN MONOHYDRATE/MACROCRYSTALS 100 MG: 75; 25 CAPSULE ORAL at 08:23

## 2023-04-15 RX ADMIN — PROCHLORPERAZINE MALEATE 5 MG: 5 TABLET ORAL at 20:32

## 2023-04-15 RX ADMIN — LISINOPRIL 5 MG: 5 TABLET ORAL at 08:23

## 2023-04-15 RX ADMIN — AMITRIPTYLINE HYDROCHLORIDE 25 MG: 25 TABLET, FILM COATED ORAL at 20:32

## 2023-04-15 RX ADMIN — PROCHLORPERAZINE MALEATE 5 MG: 5 TABLET ORAL at 14:37

## 2023-04-15 RX ADMIN — LEVETIRACETAM 750 MG: 500 TABLET, FILM COATED ORAL at 08:25

## 2023-04-15 RX ADMIN — NITROFURANTOIN MONOHYDRATE/MACROCRYSTALS 100 MG: 75; 25 CAPSULE ORAL at 20:32

## 2023-04-15 RX ADMIN — VENLAFAXINE HYDROCHLORIDE 225 MG: 150 CAPSULE, EXTENDED RELEASE ORAL at 08:23

## 2023-04-15 RX ADMIN — BUPROPION HYDROCHLORIDE 300 MG: 150 TABLET, EXTENDED RELEASE ORAL at 08:26

## 2023-04-15 RX ADMIN — SENNOSIDES AND DOCUSATE SODIUM 1 TABLET: 50; 8.6 TABLET ORAL at 08:23

## 2023-04-15 RX ADMIN — OXYCODONE 5 MG: 5 TABLET ORAL at 03:02

## 2023-04-15 RX ADMIN — PROCHLORPERAZINE MALEATE 5 MG: 5 TABLET ORAL at 08:23

## 2023-04-15 RX ADMIN — SENNOSIDES AND DOCUSATE SODIUM 1 TABLET: 50; 8.6 TABLET ORAL at 20:32

## 2023-04-15 RX ADMIN — MULTIVITAMIN TABLET 1 TABLET: TABLET at 08:24

## 2023-04-15 RX ADMIN — ATORVASTATIN CALCIUM 20 MG: 10 TABLET, FILM COATED ORAL at 20:32

## 2023-04-15 RX ADMIN — ANTACID TABLETS 1000 MG: 500 TABLET, CHEWABLE ORAL at 08:23

## 2023-04-15 RX ADMIN — OXYCODONE 5 MG: 5 TABLET ORAL at 08:23

## 2023-04-15 ASSESSMENT — PAIN DESCRIPTION - LOCATION
LOCATION: HEAD;NECK
LOCATION: HEAD

## 2023-04-15 ASSESSMENT — PAIN DESCRIPTION - DESCRIPTORS
DESCRIPTORS: ACHING
DESCRIPTORS: ACHING

## 2023-04-15 ASSESSMENT — PAIN SCALES - GENERAL
PAINLEVEL_OUTOF10: 10
PAINLEVEL_OUTOF10: 7

## 2023-04-16 ENCOUNTER — APPOINTMENT (OUTPATIENT)
Dept: GENERAL RADIOLOGY | Age: 68
DRG: 950 | End: 2023-04-16
Attending: INTERNAL MEDICINE
Payer: MEDICARE

## 2023-04-16 ENCOUNTER — APPOINTMENT (OUTPATIENT)
Dept: MRI IMAGING | Age: 68
DRG: 950 | End: 2023-04-16
Attending: INTERNAL MEDICINE
Payer: MEDICARE

## 2023-04-16 PROCEDURE — 1200000002 HC SEMI PRIVATE SWING BED

## 2023-04-16 PROCEDURE — 6370000000 HC RX 637 (ALT 250 FOR IP): Performed by: PHYSICIAN ASSISTANT

## 2023-04-16 PROCEDURE — A9577 INJ MULTIHANCE: HCPCS | Performed by: PSYCHIATRY & NEUROLOGY

## 2023-04-16 PROCEDURE — 97535 SELF CARE MNGMENT TRAINING: CPT

## 2023-04-16 PROCEDURE — 97110 THERAPEUTIC EXERCISES: CPT

## 2023-04-16 PROCEDURE — 70553 MRI BRAIN STEM W/O & W/DYE: CPT

## 2023-04-16 PROCEDURE — 6360000004 HC RX CONTRAST MEDICATION: Performed by: PSYCHIATRY & NEUROLOGY

## 2023-04-16 PROCEDURE — 6370000000 HC RX 637 (ALT 250 FOR IP): Performed by: INTERNAL MEDICINE

## 2023-04-16 RX ORDER — LORAZEPAM 0.5 MG/1
0.5 TABLET ORAL ONCE
Status: COMPLETED | OUTPATIENT
Start: 2023-04-16 | End: 2023-04-16

## 2023-04-16 RX ORDER — LORAZEPAM 0.5 MG/1
0.5 TABLET ORAL ONCE
Status: DISCONTINUED | OUTPATIENT
Start: 2023-04-16 | End: 2023-04-24 | Stop reason: HOSPADM

## 2023-04-16 RX ADMIN — LORAZEPAM 0.5 MG: 0.5 TABLET ORAL at 11:52

## 2023-04-16 RX ADMIN — SENNOSIDES AND DOCUSATE SODIUM 1 TABLET: 50; 8.6 TABLET ORAL at 08:41

## 2023-04-16 RX ADMIN — VENLAFAXINE HYDROCHLORIDE 225 MG: 150 CAPSULE, EXTENDED RELEASE ORAL at 08:39

## 2023-04-16 RX ADMIN — LISINOPRIL 5 MG: 5 TABLET ORAL at 08:40

## 2023-04-16 RX ADMIN — GADOBENATE DIMEGLUMINE 10 ML: 529 INJECTION, SOLUTION INTRAVENOUS at 13:04

## 2023-04-16 RX ADMIN — NITROFURANTOIN MONOHYDRATE/MACROCRYSTALS 100 MG: 75; 25 CAPSULE ORAL at 20:47

## 2023-04-16 RX ADMIN — AMITRIPTYLINE HYDROCHLORIDE 25 MG: 25 TABLET, FILM COATED ORAL at 20:47

## 2023-04-16 RX ADMIN — BUPROPION HYDROCHLORIDE 300 MG: 150 TABLET, EXTENDED RELEASE ORAL at 08:40

## 2023-04-16 RX ADMIN — ATORVASTATIN CALCIUM 20 MG: 10 TABLET, FILM COATED ORAL at 20:47

## 2023-04-16 RX ADMIN — MULTIVITAMIN TABLET 1 TABLET: TABLET at 08:39

## 2023-04-16 RX ADMIN — NITROFURANTOIN MONOHYDRATE/MACROCRYSTALS 100 MG: 75; 25 CAPSULE ORAL at 08:40

## 2023-04-16 RX ADMIN — ANTACID TABLETS 1000 MG: 500 TABLET, CHEWABLE ORAL at 08:40

## 2023-04-16 ASSESSMENT — PAIN DESCRIPTION - LOCATION: LOCATION: HEAD

## 2023-04-16 ASSESSMENT — PAIN SCALES - GENERAL: PAINLEVEL_OUTOF10: 7

## 2023-04-17 LAB
ANION GAP SERPL CALCULATED.3IONS-SCNC: 10 MEQ/L (ref 9–15)
BASOPHILS # BLD: 0.1 K/UL (ref 0–0.1)
BASOPHILS NFR BLD: 0.8 % (ref 0.1–1.2)
BUN SERPL-MCNC: 22 MG/DL (ref 8–23)
CALCIUM SERPL-MCNC: 9.1 MG/DL (ref 8.5–9.9)
CHLORIDE SERPL-SCNC: 95 MEQ/L (ref 95–107)
CO2 SERPL-SCNC: 28 MEQ/L (ref 20–31)
CREAT SERPL-MCNC: 0.64 MG/DL (ref 0.5–0.9)
EOSINOPHIL # BLD: 0.2 K/UL (ref 0–0.4)
EOSINOPHIL NFR BLD: 2.5 % (ref 0.7–5.8)
ERYTHROCYTE [DISTWIDTH] IN BLOOD BY AUTOMATED COUNT: 12.4 % (ref 11.7–14.4)
GLUCOSE SERPL-MCNC: 110 MG/DL (ref 70–99)
HCT VFR BLD AUTO: 40.4 % (ref 37–47)
HGB BLD-MCNC: 13.3 G/DL (ref 11.2–15.7)
IMM GRANULOCYTES # BLD: 0.1 K/UL
IMM GRANULOCYTES NFR BLD: 0.9 %
LYMPHOCYTES # BLD: 1.3 K/UL (ref 1.2–3.7)
LYMPHOCYTES NFR BLD: 14.2 %
MCH RBC QN AUTO: 29.6 PG (ref 25.6–32.2)
MCHC RBC AUTO-ENTMCNC: 32.9 % (ref 32.2–35.5)
MCV RBC AUTO: 89.8 FL (ref 79.4–94.8)
MONOCYTES # BLD: 1.2 K/UL (ref 0.2–0.9)
MONOCYTES NFR BLD: 13.4 % (ref 4.7–12.5)
NEUTROPHILS # BLD: 6.3 K/UL (ref 1.6–6.1)
NEUTS SEG NFR BLD: 68.2 % (ref 34–71.1)
PLATELET # BLD AUTO: 234 K/UL (ref 182–369)
POTASSIUM SERPL-SCNC: 3.7 MEQ/L (ref 3.4–4.9)
RBC # BLD AUTO: 4.5 M/UL (ref 3.93–5.22)
SODIUM SERPL-SCNC: 133 MEQ/L (ref 135–144)
WBC # BLD AUTO: 9.3 K/UL (ref 4–10)

## 2023-04-17 PROCEDURE — 97530 THERAPEUTIC ACTIVITIES: CPT

## 2023-04-17 PROCEDURE — 6370000000 HC RX 637 (ALT 250 FOR IP): Performed by: PHYSICIAN ASSISTANT

## 2023-04-17 PROCEDURE — 36415 COLL VENOUS BLD VENIPUNCTURE: CPT

## 2023-04-17 PROCEDURE — 85025 COMPLETE CBC W/AUTO DIFF WBC: CPT

## 2023-04-17 PROCEDURE — 97116 GAIT TRAINING THERAPY: CPT

## 2023-04-17 PROCEDURE — 6370000000 HC RX 637 (ALT 250 FOR IP): Performed by: INTERNAL MEDICINE

## 2023-04-17 PROCEDURE — 97535 SELF CARE MNGMENT TRAINING: CPT

## 2023-04-17 PROCEDURE — 80048 BASIC METABOLIC PNL TOTAL CA: CPT

## 2023-04-17 PROCEDURE — 1200000002 HC SEMI PRIVATE SWING BED

## 2023-04-17 PROCEDURE — 97110 THERAPEUTIC EXERCISES: CPT

## 2023-04-17 RX ORDER — SENNA PLUS 8.6 MG/1
1 TABLET ORAL 2 TIMES DAILY
Status: DISCONTINUED | OUTPATIENT
Start: 2023-04-17 | End: 2023-04-18

## 2023-04-17 RX ORDER — AMITRIPTYLINE HYDROCHLORIDE 25 MG/1
50 TABLET, FILM COATED ORAL NIGHTLY
Status: DISCONTINUED | OUTPATIENT
Start: 2023-04-18 | End: 2023-04-24 | Stop reason: HOSPADM

## 2023-04-17 RX ADMIN — MECLIZINE 25 MG: 12.5 TABLET ORAL at 22:06

## 2023-04-17 RX ADMIN — ATORVASTATIN CALCIUM 20 MG: 10 TABLET, FILM COATED ORAL at 20:21

## 2023-04-17 RX ADMIN — LISINOPRIL 5 MG: 5 TABLET ORAL at 08:34

## 2023-04-17 RX ADMIN — ANTACID TABLETS 1000 MG: 500 TABLET, CHEWABLE ORAL at 08:34

## 2023-04-17 RX ADMIN — MULTIVITAMIN TABLET 1 TABLET: TABLET at 08:34

## 2023-04-17 RX ADMIN — BUPROPION HYDROCHLORIDE 300 MG: 150 TABLET, EXTENDED RELEASE ORAL at 08:34

## 2023-04-17 RX ADMIN — MECLIZINE 25 MG: 12.5 TABLET ORAL at 17:47

## 2023-04-17 RX ADMIN — AMITRIPTYLINE HYDROCHLORIDE 25 MG: 25 TABLET, FILM COATED ORAL at 20:21

## 2023-04-17 RX ADMIN — VENLAFAXINE HYDROCHLORIDE 225 MG: 150 CAPSULE, EXTENDED RELEASE ORAL at 08:34

## 2023-04-17 RX ADMIN — NITROFURANTOIN MONOHYDRATE/MACROCRYSTALS 100 MG: 75; 25 CAPSULE ORAL at 08:34

## 2023-04-17 RX ADMIN — NITROFURANTOIN MONOHYDRATE/MACROCRYSTALS 100 MG: 75; 25 CAPSULE ORAL at 20:21

## 2023-04-17 ASSESSMENT — PAIN SCALES - GENERAL: PAINLEVEL_OUTOF10: 0

## 2023-04-18 PROCEDURE — 6370000000 HC RX 637 (ALT 250 FOR IP): Performed by: INTERNAL MEDICINE

## 2023-04-18 PROCEDURE — 6370000000 HC RX 637 (ALT 250 FOR IP): Performed by: PSYCHIATRY & NEUROLOGY

## 2023-04-18 PROCEDURE — 97161 PT EVAL LOW COMPLEX 20 MIN: CPT

## 2023-04-18 PROCEDURE — 97530 THERAPEUTIC ACTIVITIES: CPT

## 2023-04-18 PROCEDURE — 97535 SELF CARE MNGMENT TRAINING: CPT

## 2023-04-18 PROCEDURE — 97140 MANUAL THERAPY 1/> REGIONS: CPT

## 2023-04-18 PROCEDURE — 97116 GAIT TRAINING THERAPY: CPT

## 2023-04-18 PROCEDURE — 6370000000 HC RX 637 (ALT 250 FOR IP): Performed by: PHYSICIAN ASSISTANT

## 2023-04-18 PROCEDURE — 1200000002 HC SEMI PRIVATE SWING BED

## 2023-04-18 RX ADMIN — NITROFURANTOIN MONOHYDRATE/MACROCRYSTALS 100 MG: 75; 25 CAPSULE ORAL at 09:10

## 2023-04-18 RX ADMIN — OXYCODONE 5 MG: 5 TABLET ORAL at 09:10

## 2023-04-18 RX ADMIN — MECLIZINE 25 MG: 12.5 TABLET ORAL at 23:37

## 2023-04-18 RX ADMIN — VENLAFAXINE HYDROCHLORIDE 225 MG: 150 CAPSULE, EXTENDED RELEASE ORAL at 09:09

## 2023-04-18 RX ADMIN — MULTIVITAMIN TABLET 1 TABLET: TABLET at 09:10

## 2023-04-18 RX ADMIN — PROCHLORPERAZINE MALEATE 5 MG: 5 TABLET ORAL at 17:17

## 2023-04-18 RX ADMIN — NITROFURANTOIN MONOHYDRATE/MACROCRYSTALS 100 MG: 75; 25 CAPSULE ORAL at 21:18

## 2023-04-18 RX ADMIN — PROCHLORPERAZINE MALEATE 5 MG: 5 TABLET ORAL at 09:12

## 2023-04-18 RX ADMIN — ANTACID TABLETS 1000 MG: 500 TABLET, CHEWABLE ORAL at 09:10

## 2023-04-18 RX ADMIN — MECLIZINE 25 MG: 12.5 TABLET ORAL at 05:50

## 2023-04-18 RX ADMIN — OXYCODONE 5 MG: 5 TABLET ORAL at 17:17

## 2023-04-18 RX ADMIN — MECLIZINE 25 MG: 12.5 TABLET ORAL at 17:17

## 2023-04-18 RX ADMIN — ATORVASTATIN CALCIUM 20 MG: 10 TABLET, FILM COATED ORAL at 21:18

## 2023-04-18 RX ADMIN — SENNOSIDES AND DOCUSATE SODIUM 1 TABLET: 50; 8.6 TABLET ORAL at 09:10

## 2023-04-18 RX ADMIN — OXYCODONE 5 MG: 5 TABLET ORAL at 23:36

## 2023-04-18 RX ADMIN — LISINOPRIL 5 MG: 5 TABLET ORAL at 09:10

## 2023-04-18 RX ADMIN — AMITRIPTYLINE HYDROCHLORIDE 50 MG: 25 TABLET, FILM COATED ORAL at 21:18

## 2023-04-18 RX ADMIN — BUPROPION HYDROCHLORIDE 300 MG: 150 TABLET, EXTENDED RELEASE ORAL at 09:09

## 2023-04-18 ASSESSMENT — PAIN DESCRIPTION - LOCATION
LOCATION: HEAD
LOCATION: HEAD

## 2023-04-18 ASSESSMENT — PAIN DESCRIPTION - DESCRIPTORS: DESCRIPTORS: ACHING

## 2023-04-18 ASSESSMENT — PAIN SCALES - GENERAL
PAINLEVEL_OUTOF10: 7
PAINLEVEL_OUTOF10: 8

## 2023-04-19 PROCEDURE — 97116 GAIT TRAINING THERAPY: CPT

## 2023-04-19 PROCEDURE — 6370000000 HC RX 637 (ALT 250 FOR IP): Performed by: INTERNAL MEDICINE

## 2023-04-19 PROCEDURE — 97530 THERAPEUTIC ACTIVITIES: CPT

## 2023-04-19 PROCEDURE — 97110 THERAPEUTIC EXERCISES: CPT

## 2023-04-19 PROCEDURE — 6370000000 HC RX 637 (ALT 250 FOR IP): Performed by: PHYSICIAN ASSISTANT

## 2023-04-19 PROCEDURE — 6370000000 HC RX 637 (ALT 250 FOR IP): Performed by: PSYCHIATRY & NEUROLOGY

## 2023-04-19 PROCEDURE — 1200000002 HC SEMI PRIVATE SWING BED

## 2023-04-19 RX ORDER — SCOLOPAMINE TRANSDERMAL SYSTEM 1 MG/1
1 PATCH, EXTENDED RELEASE TRANSDERMAL ONCE
Status: DISCONTINUED | OUTPATIENT
Start: 2023-04-19 | End: 2023-04-24 | Stop reason: HOSPADM

## 2023-04-19 RX ADMIN — ATORVASTATIN CALCIUM 20 MG: 10 TABLET, FILM COATED ORAL at 20:10

## 2023-04-19 RX ADMIN — OXYCODONE 5 MG: 5 TABLET ORAL at 20:08

## 2023-04-19 RX ADMIN — MECLIZINE 25 MG: 12.5 TABLET ORAL at 12:30

## 2023-04-19 RX ADMIN — ACETAMINOPHEN 650 MG: 325 TABLET ORAL at 20:09

## 2023-04-19 RX ADMIN — MECLIZINE 25 MG: 12.5 TABLET ORAL at 17:39

## 2023-04-19 RX ADMIN — OXYCODONE 5 MG: 5 TABLET ORAL at 06:00

## 2023-04-19 RX ADMIN — BUPROPION HYDROCHLORIDE 300 MG: 150 TABLET, EXTENDED RELEASE ORAL at 09:30

## 2023-04-19 RX ADMIN — SENNOSIDES AND DOCUSATE SODIUM 1 TABLET: 50; 8.6 TABLET ORAL at 09:30

## 2023-04-19 RX ADMIN — NITROFURANTOIN MONOHYDRATE/MACROCRYSTALS 100 MG: 75; 25 CAPSULE ORAL at 09:29

## 2023-04-19 RX ADMIN — LISINOPRIL 5 MG: 5 TABLET ORAL at 09:30

## 2023-04-19 RX ADMIN — NITROFURANTOIN MONOHYDRATE/MACROCRYSTALS 100 MG: 75; 25 CAPSULE ORAL at 20:07

## 2023-04-19 RX ADMIN — MECLIZINE 25 MG: 12.5 TABLET ORAL at 05:59

## 2023-04-19 RX ADMIN — VENLAFAXINE HYDROCHLORIDE 225 MG: 150 CAPSULE, EXTENDED RELEASE ORAL at 09:29

## 2023-04-19 RX ADMIN — ANTACID TABLETS 1000 MG: 500 TABLET, CHEWABLE ORAL at 09:31

## 2023-04-19 RX ADMIN — AMITRIPTYLINE HYDROCHLORIDE 50 MG: 25 TABLET, FILM COATED ORAL at 20:10

## 2023-04-19 RX ADMIN — OXYCODONE 5 MG: 5 TABLET ORAL at 12:30

## 2023-04-19 RX ADMIN — MULTIVITAMIN TABLET 1 TABLET: TABLET at 09:30

## 2023-04-19 ASSESSMENT — PAIN - FUNCTIONAL ASSESSMENT: PAIN_FUNCTIONAL_ASSESSMENT: PREVENTS OR INTERFERES SOME ACTIVE ACTIVITIES AND ADLS

## 2023-04-19 ASSESSMENT — PAIN DESCRIPTION - DESCRIPTORS
DESCRIPTORS: CRUSHING
DESCRIPTORS: ACHING

## 2023-04-19 ASSESSMENT — PAIN SCALES - GENERAL
PAINLEVEL_OUTOF10: 8
PAINLEVEL_OUTOF10: 9

## 2023-04-19 ASSESSMENT — PAIN DESCRIPTION - LOCATION
LOCATION: HEAD
LOCATION: HEAD

## 2023-04-19 ASSESSMENT — PAIN DESCRIPTION - ORIENTATION: ORIENTATION: RIGHT

## 2023-04-20 ENCOUNTER — TELEPHONE (OUTPATIENT)
Dept: NEUROSURGERY | Age: 68
End: 2023-04-20

## 2023-04-20 LAB
ANION GAP SERPL CALCULATED.3IONS-SCNC: 7 MEQ/L (ref 9–15)
BASOPHILS # BLD: 0.1 K/UL (ref 0–0.1)
BASOPHILS NFR BLD: 1 % (ref 0.1–1.2)
BUN SERPL-MCNC: 21 MG/DL (ref 8–23)
CALCIUM SERPL-MCNC: 8.9 MG/DL (ref 8.5–9.9)
CHLORIDE SERPL-SCNC: 102 MEQ/L (ref 95–107)
CO2 SERPL-SCNC: 30 MEQ/L (ref 20–31)
CREAT SERPL-MCNC: 0.68 MG/DL (ref 0.5–0.9)
EOSINOPHIL # BLD: 0.3 K/UL (ref 0–0.4)
EOSINOPHIL NFR BLD: 3.7 % (ref 0.7–5.8)
ERYTHROCYTE [DISTWIDTH] IN BLOOD BY AUTOMATED COUNT: 12.8 % (ref 11.7–14.4)
GLUCOSE SERPL-MCNC: 103 MG/DL (ref 70–99)
HCT VFR BLD AUTO: 35.7 % (ref 37–47)
HGB BLD-MCNC: 11.6 G/DL (ref 11.2–15.7)
IMM GRANULOCYTES # BLD: 0 K/UL
IMM GRANULOCYTES NFR BLD: 0.6 %
LYMPHOCYTES # BLD: 1.5 K/UL (ref 1.2–3.7)
LYMPHOCYTES NFR BLD: 21.9 %
MCH RBC QN AUTO: 29.9 PG (ref 25.6–32.2)
MCHC RBC AUTO-ENTMCNC: 32.5 % (ref 32.2–35.5)
MCV RBC AUTO: 92 FL (ref 79.4–94.8)
MONOCYTES # BLD: 1 K/UL (ref 0.2–0.9)
MONOCYTES NFR BLD: 13.5 % (ref 4.7–12.5)
NEUTROPHILS # BLD: 4.2 K/UL (ref 1.6–6.1)
NEUTS SEG NFR BLD: 59.3 % (ref 34–71.1)
PLATELET # BLD AUTO: 292 K/UL (ref 182–369)
POTASSIUM SERPL-SCNC: 4.7 MEQ/L (ref 3.4–4.9)
RBC # BLD AUTO: 3.88 M/UL (ref 3.93–5.22)
SODIUM SERPL-SCNC: 139 MEQ/L (ref 135–144)
WBC # BLD AUTO: 7 K/UL (ref 4–10)

## 2023-04-20 PROCEDURE — 97110 THERAPEUTIC EXERCISES: CPT

## 2023-04-20 PROCEDURE — 1200000002 HC SEMI PRIVATE SWING BED

## 2023-04-20 PROCEDURE — 36415 COLL VENOUS BLD VENIPUNCTURE: CPT

## 2023-04-20 PROCEDURE — 85025 COMPLETE CBC W/AUTO DIFF WBC: CPT

## 2023-04-20 PROCEDURE — 6370000000 HC RX 637 (ALT 250 FOR IP): Performed by: INTERNAL MEDICINE

## 2023-04-20 PROCEDURE — 6370000000 HC RX 637 (ALT 250 FOR IP): Performed by: PSYCHIATRY & NEUROLOGY

## 2023-04-20 PROCEDURE — 6370000000 HC RX 637 (ALT 250 FOR IP): Performed by: PHYSICIAN ASSISTANT

## 2023-04-20 PROCEDURE — 97530 THERAPEUTIC ACTIVITIES: CPT

## 2023-04-20 PROCEDURE — 80048 BASIC METABOLIC PNL TOTAL CA: CPT

## 2023-04-20 PROCEDURE — 97116 GAIT TRAINING THERAPY: CPT

## 2023-04-20 PROCEDURE — 97535 SELF CARE MNGMENT TRAINING: CPT

## 2023-04-20 RX ADMIN — ANTACID TABLETS 1000 MG: 500 TABLET, CHEWABLE ORAL at 08:44

## 2023-04-20 RX ADMIN — NITROFURANTOIN MONOHYDRATE/MACROCRYSTALS 100 MG: 75; 25 CAPSULE ORAL at 08:44

## 2023-04-20 RX ADMIN — VENLAFAXINE HYDROCHLORIDE 225 MG: 150 CAPSULE, EXTENDED RELEASE ORAL at 08:44

## 2023-04-20 RX ADMIN — LISINOPRIL 5 MG: 5 TABLET ORAL at 08:44

## 2023-04-20 RX ADMIN — AMITRIPTYLINE HYDROCHLORIDE 50 MG: 25 TABLET, FILM COATED ORAL at 20:25

## 2023-04-20 RX ADMIN — ATORVASTATIN CALCIUM 20 MG: 10 TABLET, FILM COATED ORAL at 20:24

## 2023-04-20 RX ADMIN — BUPROPION HYDROCHLORIDE 300 MG: 150 TABLET, EXTENDED RELEASE ORAL at 08:44

## 2023-04-20 RX ADMIN — SENNOSIDES AND DOCUSATE SODIUM 1 TABLET: 50; 8.6 TABLET ORAL at 08:44

## 2023-04-20 RX ADMIN — MECLIZINE 25 MG: 12.5 TABLET ORAL at 11:54

## 2023-04-20 RX ADMIN — NITROFURANTOIN MONOHYDRATE/MACROCRYSTALS 100 MG: 75; 25 CAPSULE ORAL at 20:24

## 2023-04-20 RX ADMIN — MECLIZINE 25 MG: 12.5 TABLET ORAL at 00:25

## 2023-04-20 RX ADMIN — MULTIVITAMIN TABLET 1 TABLET: TABLET at 08:44

## 2023-04-20 RX ADMIN — OXYCODONE 5 MG: 5 TABLET ORAL at 17:32

## 2023-04-20 RX ADMIN — MECLIZINE 25 MG: 12.5 TABLET ORAL at 06:45

## 2023-04-20 NOTE — TELEPHONE ENCOUNTER
It appears that neurology will follow the patient up Dr. Pearla Kocher.   Cancel my orders and follow-up

## 2023-04-20 NOTE — TELEPHONE ENCOUNTER
Patient was seen in hospital by Dr. Radha Deleon [phone]. A follow up CT Head was ordered to be done in one month. Patient is currently admitted at [facility] and has been seen by Dr. Jina Laura and Dr. Zacarias Nath with studies. Will Dr. Radha Deleon please review patient's chart and determine if the CT Head and follow up appointment  with him nic to be scheduled?

## 2023-04-21 PROCEDURE — 6370000000 HC RX 637 (ALT 250 FOR IP): Performed by: INTERNAL MEDICINE

## 2023-04-21 PROCEDURE — 97110 THERAPEUTIC EXERCISES: CPT

## 2023-04-21 PROCEDURE — 97535 SELF CARE MNGMENT TRAINING: CPT

## 2023-04-21 PROCEDURE — 97530 THERAPEUTIC ACTIVITIES: CPT

## 2023-04-21 PROCEDURE — 6370000000 HC RX 637 (ALT 250 FOR IP): Performed by: PSYCHIATRY & NEUROLOGY

## 2023-04-21 PROCEDURE — 1200000002 HC SEMI PRIVATE SWING BED

## 2023-04-21 PROCEDURE — 97116 GAIT TRAINING THERAPY: CPT

## 2023-04-21 PROCEDURE — 6370000000 HC RX 637 (ALT 250 FOR IP): Performed by: PHYSICIAN ASSISTANT

## 2023-04-21 RX ORDER — MECOBALAMIN 5000 MCG
5 TABLET,DISINTEGRATING ORAL NIGHTLY PRN
Status: DISCONTINUED | OUTPATIENT
Start: 2023-04-21 | End: 2023-04-24 | Stop reason: HOSPADM

## 2023-04-21 RX ADMIN — ANTACID TABLETS 1000 MG: 500 TABLET, CHEWABLE ORAL at 09:41

## 2023-04-21 RX ADMIN — LISINOPRIL 5 MG: 5 TABLET ORAL at 09:42

## 2023-04-21 RX ADMIN — PROCHLORPERAZINE MALEATE 5 MG: 5 TABLET ORAL at 09:42

## 2023-04-21 RX ADMIN — OXYCODONE 5 MG: 5 TABLET ORAL at 09:41

## 2023-04-21 RX ADMIN — MULTIVITAMIN TABLET 1 TABLET: TABLET at 09:42

## 2023-04-21 RX ADMIN — SENNOSIDES AND DOCUSATE SODIUM 1 TABLET: 50; 8.6 TABLET ORAL at 09:41

## 2023-04-21 RX ADMIN — NITROFURANTOIN MONOHYDRATE/MACROCRYSTALS 100 MG: 75; 25 CAPSULE ORAL at 09:41

## 2023-04-21 RX ADMIN — ATORVASTATIN CALCIUM 20 MG: 10 TABLET, FILM COATED ORAL at 21:43

## 2023-04-21 RX ADMIN — MECLIZINE 25 MG: 12.5 TABLET ORAL at 06:00

## 2023-04-21 RX ADMIN — AMITRIPTYLINE HYDROCHLORIDE 50 MG: 25 TABLET, FILM COATED ORAL at 21:43

## 2023-04-21 RX ADMIN — MECLIZINE 25 MG: 12.5 TABLET ORAL at 00:28

## 2023-04-21 RX ADMIN — Medication 5 MG: at 00:46

## 2023-04-21 RX ADMIN — VENLAFAXINE HYDROCHLORIDE 225 MG: 150 CAPSULE, EXTENDED RELEASE ORAL at 09:40

## 2023-04-21 RX ADMIN — BUPROPION HYDROCHLORIDE 300 MG: 150 TABLET, EXTENDED RELEASE ORAL at 09:40

## 2023-04-21 RX ADMIN — NITROFURANTOIN MONOHYDRATE/MACROCRYSTALS 100 MG: 75; 25 CAPSULE ORAL at 21:43

## 2023-04-22 PROCEDURE — 97116 GAIT TRAINING THERAPY: CPT

## 2023-04-22 PROCEDURE — 97110 THERAPEUTIC EXERCISES: CPT

## 2023-04-22 PROCEDURE — 6370000000 HC RX 637 (ALT 250 FOR IP): Performed by: INTERNAL MEDICINE

## 2023-04-22 PROCEDURE — 97535 SELF CARE MNGMENT TRAINING: CPT

## 2023-04-22 PROCEDURE — 6370000000 HC RX 637 (ALT 250 FOR IP): Performed by: PHYSICIAN ASSISTANT

## 2023-04-22 PROCEDURE — 6370000000 HC RX 637 (ALT 250 FOR IP): Performed by: PSYCHIATRY & NEUROLOGY

## 2023-04-22 PROCEDURE — 1200000002 HC SEMI PRIVATE SWING BED

## 2023-04-22 RX ADMIN — OXYCODONE 5 MG: 5 TABLET ORAL at 09:00

## 2023-04-22 RX ADMIN — ATORVASTATIN CALCIUM 20 MG: 10 TABLET, FILM COATED ORAL at 21:04

## 2023-04-22 RX ADMIN — MULTIVITAMIN TABLET 1 TABLET: TABLET at 08:58

## 2023-04-22 RX ADMIN — SENNOSIDES AND DOCUSATE SODIUM 1 TABLET: 50; 8.6 TABLET ORAL at 08:59

## 2023-04-22 RX ADMIN — MECLIZINE 25 MG: 12.5 TABLET ORAL at 06:51

## 2023-04-22 RX ADMIN — NITROFURANTOIN MONOHYDRATE/MACROCRYSTALS 100 MG: 75; 25 CAPSULE ORAL at 09:00

## 2023-04-22 RX ADMIN — MECLIZINE 25 MG: 12.5 TABLET ORAL at 13:00

## 2023-04-22 RX ADMIN — LISINOPRIL 5 MG: 5 TABLET ORAL at 09:00

## 2023-04-22 RX ADMIN — MECLIZINE 25 MG: 12.5 TABLET ORAL at 18:00

## 2023-04-22 RX ADMIN — ANTACID TABLETS 1000 MG: 500 TABLET, CHEWABLE ORAL at 08:58

## 2023-04-22 RX ADMIN — VENLAFAXINE HYDROCHLORIDE 225 MG: 150 CAPSULE, EXTENDED RELEASE ORAL at 08:59

## 2023-04-22 RX ADMIN — AMITRIPTYLINE HYDROCHLORIDE 50 MG: 25 TABLET, FILM COATED ORAL at 21:03

## 2023-04-22 RX ADMIN — NITROFURANTOIN MONOHYDRATE/MACROCRYSTALS 100 MG: 75; 25 CAPSULE ORAL at 21:03

## 2023-04-22 RX ADMIN — BUPROPION HYDROCHLORIDE 300 MG: 150 TABLET, EXTENDED RELEASE ORAL at 08:58

## 2023-04-22 RX ADMIN — MECLIZINE 25 MG: 12.5 TABLET ORAL at 01:24

## 2023-04-22 ASSESSMENT — PAIN SCALES - GENERAL
PAINLEVEL_OUTOF10: 3
PAINLEVEL_OUTOF10: 3

## 2023-04-22 ASSESSMENT — PAIN DESCRIPTION - LOCATION: LOCATION: HEAD

## 2023-04-23 PROCEDURE — 6370000000 HC RX 637 (ALT 250 FOR IP): Performed by: INTERNAL MEDICINE

## 2023-04-23 PROCEDURE — 6370000000 HC RX 637 (ALT 250 FOR IP): Performed by: PSYCHIATRY & NEUROLOGY

## 2023-04-23 PROCEDURE — 1200000002 HC SEMI PRIVATE SWING BED

## 2023-04-23 PROCEDURE — 97535 SELF CARE MNGMENT TRAINING: CPT

## 2023-04-23 PROCEDURE — 97530 THERAPEUTIC ACTIVITIES: CPT

## 2023-04-23 PROCEDURE — 6370000000 HC RX 637 (ALT 250 FOR IP): Performed by: PHYSICIAN ASSISTANT

## 2023-04-23 PROCEDURE — 97116 GAIT TRAINING THERAPY: CPT

## 2023-04-23 RX ADMIN — MECLIZINE 25 MG: 12.5 TABLET ORAL at 12:28

## 2023-04-23 RX ADMIN — NITROFURANTOIN MONOHYDRATE/MACROCRYSTALS 100 MG: 75; 25 CAPSULE ORAL at 20:46

## 2023-04-23 RX ADMIN — MULTIVITAMIN TABLET 1 TABLET: TABLET at 08:57

## 2023-04-23 RX ADMIN — MECLIZINE 25 MG: 12.5 TABLET ORAL at 17:15

## 2023-04-23 RX ADMIN — LISINOPRIL 5 MG: 5 TABLET ORAL at 08:56

## 2023-04-23 RX ADMIN — MECLIZINE 25 MG: 12.5 TABLET ORAL at 06:14

## 2023-04-23 RX ADMIN — ANTACID TABLETS 1000 MG: 500 TABLET, CHEWABLE ORAL at 08:57

## 2023-04-23 RX ADMIN — OXYCODONE 5 MG: 5 TABLET ORAL at 08:55

## 2023-04-23 RX ADMIN — ATORVASTATIN CALCIUM 20 MG: 10 TABLET, FILM COATED ORAL at 20:46

## 2023-04-23 RX ADMIN — MECLIZINE 25 MG: 12.5 TABLET ORAL at 00:11

## 2023-04-23 RX ADMIN — VENLAFAXINE HYDROCHLORIDE 225 MG: 150 CAPSULE, EXTENDED RELEASE ORAL at 08:57

## 2023-04-23 RX ADMIN — NITROFURANTOIN MONOHYDRATE/MACROCRYSTALS 100 MG: 75; 25 CAPSULE ORAL at 08:55

## 2023-04-23 RX ADMIN — SENNOSIDES AND DOCUSATE SODIUM 1 TABLET: 50; 8.6 TABLET ORAL at 08:55

## 2023-04-23 RX ADMIN — BUPROPION HYDROCHLORIDE 300 MG: 150 TABLET, EXTENDED RELEASE ORAL at 08:56

## 2023-04-23 RX ADMIN — AMITRIPTYLINE HYDROCHLORIDE 50 MG: 25 TABLET, FILM COATED ORAL at 20:46

## 2023-04-23 RX ADMIN — POLYETHYLENE GLYCOL 3350 17 G: 17 POWDER, FOR SOLUTION ORAL at 08:58

## 2023-04-23 RX ADMIN — OXYCODONE 5 MG: 5 TABLET ORAL at 20:46

## 2023-04-23 ASSESSMENT — PAIN SCALES - GENERAL
PAINLEVEL_OUTOF10: 3
PAINLEVEL_OUTOF10: 3
PAINLEVEL_OUTOF10: 1
PAINLEVEL_OUTOF10: 8

## 2023-04-23 ASSESSMENT — PAIN DESCRIPTION - LOCATION
LOCATION: HEAD
LOCATION: HEAD

## 2023-04-24 VITALS
HEART RATE: 74 BPM | TEMPERATURE: 97.7 F | BODY MASS INDEX: 22.73 KG/M2 | DIASTOLIC BLOOD PRESSURE: 73 MMHG | HEIGHT: 65 IN | RESPIRATION RATE: 16 BRPM | WEIGHT: 136.4 LBS | SYSTOLIC BLOOD PRESSURE: 116 MMHG | OXYGEN SATURATION: 97 %

## 2023-04-24 LAB
ANION GAP SERPL CALCULATED.3IONS-SCNC: 9 MEQ/L (ref 9–15)
BASOPHILS # BLD: 0.1 K/UL (ref 0–0.1)
BASOPHILS NFR BLD: 0.6 % (ref 0.1–1.2)
BUN SERPL-MCNC: 25 MG/DL (ref 8–23)
CALCIUM SERPL-MCNC: 8.7 MG/DL (ref 8.5–9.9)
CHLORIDE SERPL-SCNC: 103 MEQ/L (ref 95–107)
CO2 SERPL-SCNC: 28 MEQ/L (ref 20–31)
CREAT SERPL-MCNC: 0.68 MG/DL (ref 0.5–0.9)
EOSINOPHIL # BLD: 0.3 K/UL (ref 0–0.4)
EOSINOPHIL NFR BLD: 4.2 % (ref 0.7–5.8)
ERYTHROCYTE [DISTWIDTH] IN BLOOD BY AUTOMATED COUNT: 13.2 % (ref 11.7–14.4)
GLUCOSE SERPL-MCNC: 101 MG/DL (ref 70–99)
HCT VFR BLD AUTO: 33.9 % (ref 37–47)
HGB BLD-MCNC: 11.1 G/DL (ref 11.2–15.7)
IMM GRANULOCYTES # BLD: 0 K/UL
IMM GRANULOCYTES NFR BLD: 0.4 %
LYMPHOCYTES # BLD: 1.5 K/UL (ref 1.2–3.7)
LYMPHOCYTES NFR BLD: 18.2 %
MCH RBC QN AUTO: 30.2 PG (ref 25.6–32.2)
MCHC RBC AUTO-ENTMCNC: 32.7 % (ref 32.2–35.5)
MCV RBC AUTO: 92.4 FL (ref 79.4–94.8)
MONOCYTES # BLD: 0.9 K/UL (ref 0.2–0.9)
MONOCYTES NFR BLD: 11.3 % (ref 4.7–12.5)
NEUTROPHILS # BLD: 5.3 K/UL (ref 1.6–6.1)
NEUTS SEG NFR BLD: 65.3 % (ref 34–71.1)
PLATELET # BLD AUTO: 313 K/UL (ref 182–369)
POTASSIUM SERPL-SCNC: 4.4 MEQ/L (ref 3.4–4.9)
RBC # BLD AUTO: 3.67 M/UL (ref 3.93–5.22)
SODIUM SERPL-SCNC: 140 MEQ/L (ref 135–144)
WBC # BLD AUTO: 8.1 K/UL (ref 4–10)

## 2023-04-24 PROCEDURE — 85025 COMPLETE CBC W/AUTO DIFF WBC: CPT

## 2023-04-24 PROCEDURE — 6370000000 HC RX 637 (ALT 250 FOR IP): Performed by: PHYSICIAN ASSISTANT

## 2023-04-24 PROCEDURE — 80048 BASIC METABOLIC PNL TOTAL CA: CPT

## 2023-04-24 PROCEDURE — 6370000000 HC RX 637 (ALT 250 FOR IP): Performed by: INTERNAL MEDICINE

## 2023-04-24 PROCEDURE — 36415 COLL VENOUS BLD VENIPUNCTURE: CPT

## 2023-04-24 RX ORDER — MECLIZINE HYDROCHLORIDE 25 MG/1
25 TABLET ORAL 3 TIMES DAILY
Qty: 30 TABLET | Refills: 0 | Status: SHIPPED | OUTPATIENT
Start: 2023-04-24 | End: 2023-05-04

## 2023-04-24 RX ADMIN — VENLAFAXINE HYDROCHLORIDE 225 MG: 150 CAPSULE, EXTENDED RELEASE ORAL at 09:15

## 2023-04-24 RX ADMIN — BUPROPION HYDROCHLORIDE 300 MG: 150 TABLET, EXTENDED RELEASE ORAL at 09:14

## 2023-04-24 RX ADMIN — LISINOPRIL 5 MG: 5 TABLET ORAL at 09:15

## 2023-04-24 RX ADMIN — OXYCODONE 5 MG: 5 TABLET ORAL at 09:14

## 2023-04-24 RX ADMIN — POLYETHYLENE GLYCOL 3350 17 G: 17 POWDER, FOR SOLUTION ORAL at 09:16

## 2023-04-24 RX ADMIN — SENNOSIDES AND DOCUSATE SODIUM 1 TABLET: 50; 8.6 TABLET ORAL at 09:15

## 2023-04-24 RX ADMIN — ANTACID TABLETS 1000 MG: 500 TABLET, CHEWABLE ORAL at 09:15

## 2023-04-24 RX ADMIN — MULTIVITAMIN TABLET 1 TABLET: TABLET at 09:15

## 2023-04-24 ASSESSMENT — PAIN DESCRIPTION - ORIENTATION: ORIENTATION: RIGHT

## 2023-04-24 ASSESSMENT — PAIN DESCRIPTION - DESCRIPTORS: DESCRIPTORS: ACHING

## 2023-04-24 ASSESSMENT — PAIN SCALES - GENERAL: PAINLEVEL_OUTOF10: 9

## 2023-04-24 ASSESSMENT — PAIN DESCRIPTION - LOCATION: LOCATION: HEAD

## 2023-04-24 NOTE — DISCHARGE SUMMARY
Discharge Summary    Patient:  Laura Bullard  YOB: 1955    MRN: Ala Player: [de-identified]    Primary Care Physician: Danuta Nelson MD    Admit date:  4/12/2023    Discharge date:   04/24/23      Discharge Diagnoses:   Subdural hematoma [facility])  Principal Problem:    Subdural hematoma ([address]  Resolved Problems:    * No resolved hospital problems. *      Admitted for: [facility] Course: 75 y.o. female who presented to [facility] after acute admission to [facility] under trauma service. She suffered from mechanical fall backwards with head trauma, developed subarachnoid hemorrhage, was evaluated by trauma team/neurology/neurosurgery and managed conservatively. Since trauma she was complaining on nausea/dizziness. After completing her acute stay was transferred to [facility] for post acute rehabilitation. At [facility] she was reevaluated by neurology service, underwent brain MRI. Was participating in therapy.  Her condition generally improved and after completing her post acute rehab she will be DC home with Christy De León          Consultants:  neurology    Discharge Medications:       Medication List        CONTINUE taking these medications      amitriptyline 25 MG tablet  Commonly known as: ELAVIL     atorvastatin 20 MG tablet  Commonly known as: LIPITOR     buPROPion 300 MG extended release tablet  Commonly known as: WELLBUTRIN XL     CALCIUM 1200 PO     lisinopril 5 MG tablet  Commonly known as: PRINIVIL;ZESTRIL     meclizine 25 MG tablet  Commonly known as: ANTIVERT  Take 1 tablet by mouth 3 times daily for 10 days     MULTIVITAMINS PO     polyethylene glycol 17 g Pack packet  Commonly known as: MIRALAX     trimethobenzamide 100 MG/ML injection  Commonly known as: TIGAN  Inject 2 mLs into the muscle every 6 hours as needed for Nausea     venlafaxine 150 MG extended release capsule  Commonly known as: EFFEXOR XR            STOP taking these medications      levETIRAcetam 750 MG tablet  Commonly known as:

## 2023-04-24 NOTE — PROGRESS NOTES
Pt took hs meds. She reports she has not felt dizzy and is unsure that she will need the scheduled Antivert at [address] if she is sleeping. We discussed that if she should awaken and feel any dizziness, to let me know right away so that we could administer. Call light is in reach. Will monitor.      Mattie Obregon RN

## 2023-04-24 NOTE — PROGRESS NOTES
Impression: Exudative age-related macular degeneration, right eye, with active choroidal neovascularization: H35.3211. Right.
s/p Avastin 1/12/21
last DFE OU 1/12/21 Plan: --exam/OCT reveal improving IRF/SRF OD
--findings d/w pt, rec cont anti-VEGF tx q6 weeks to maintain vision --pt understands tx may not improve VA but should reduce the risk of further visual loss --r/b/a of Avastin for NVAMD discussed, pt understands Avastin is considered off-label for NVAMD
--pt elects to cont Avastin under OCT guidance, injection performed successfully w/o complication --pt instructed to call immediately with s/s VA loss/pain RTC 6 weeks for OCT OU re-eval Avastin Pt a and o x4. Pleasant. Slept in this morning until about 9. Took all AM pills. C/o of head ache right side. Tylenol given with positive results. Denies dizziness. Up with 1 SBA. Last bm 4/23. Plan is to DC home today at 1 pm. Pt aware. DC order written. F/U appts made. No other needs at this time. Mejia light in reach.

## 2023-05-02 NOTE — PROGRESS NOTES
Patient Name: Christopher August : 1955        Date: 2023      Type of Appt: Follow up    Reason for appt: follow up from hospital consult, pt still wants to see [facility]    Per pt - does not have follow up appts with Dr. Sherren Gant or Dr. Norris [address]    Pt last seen by Dr. Magalis Astorga on 23 as hospital consult    Studies done: 23 Brain MRI @ [address], 23 Head CT @ [address] [facility]     Smoking: No    REVIEW OF SYSTEMS:    Headaches, Bruising/Bleeding Easily , Hearing loss, Neck Pain                              [facility]) Physicians  Neurosurgery and Pain [facility]  [address] [facility], [address] [facility] , [address] [facility] 82: [phone]  F: [phone]          Patient:  Christopher August  YOB: 1955  Date: 2023    The patient is a 75 y.o. female who presents today for evaluation of the following problems:     Chief Complaint   Patient presents with    Other        Referred by No ref. provider found      PAST MEDICAL, FAMILY AND SOCIAL HISTORY:  Past Medical History:   Diagnosis Date    Anemia     Depression     Headache     Hypertension     Osteoarthritis      Past Surgical History:   Procedure Laterality Date    HYSTERECTOMY (CERVIX STATUS UNKNOWN)      RECTAL SURGERY       No family history on file.   Current Outpatient Medications on File Prior to Visit   Medication Sig Dispense Refill    meclizine (ANTIVERT) 25 MG tablet Take 1 tablet by mouth 3 times daily for 10 days 30 tablet 0    trimethobenzamide (TIGAN) 100 MG/ML injection Inject 2 mLs into the muscle every 6 hours as needed for Nausea 100 mL 0    polyethylene glycol (MIRALAX) 17 g PACK packet Take 17 g by mouth daily      atorvastatin (LIPITOR) 20 MG tablet Take 1 tablet by mouth daily      lisinopril (PRINIVIL;ZESTRIL) 5 MG tablet Take 1 tablet by mouth daily      venlafaxine (EFFEXOR XR) 150 MG extended release capsule 225 mg daily      buPROPion (WELLBUTRIN XL) 300 MG extended release tablet 1 tablet every morning      amitriptyline (ELAVIL) 25 MG

## 2023-05-04 ENCOUNTER — OFFICE VISIT (OUTPATIENT)
Dept: NEUROSURGERY | Age: 68
End: 2023-05-04
Payer: MEDICARE

## 2023-05-04 VITALS
WEIGHT: 137 LBS | DIASTOLIC BLOOD PRESSURE: 80 MMHG | SYSTOLIC BLOOD PRESSURE: 136 MMHG | BODY MASS INDEX: 22.02 KG/M2 | HEIGHT: 66 IN | TEMPERATURE: 97.5 F

## 2023-05-04 DIAGNOSIS — S09.90XS INJURY OF HEAD, SEQUELA: ICD-10-CM

## 2023-05-04 DIAGNOSIS — S01.01XS LACERATION OF SCALP, SEQUELA: ICD-10-CM
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
## 2023-05-04 DIAGNOSIS — H81.4 CENTRAL VESTIBULAR VERTIGO: ICD-10-CM

## 2023-05-04 DIAGNOSIS — H91.90 HEARING LOSS, UNSPECIFIED HEARING LOSS TYPE, UNSPECIFIED LATERALITY: ICD-10-CM

## 2023-05-04 PROBLEM — S01.01XA SCALP LACERATION: Status: ACTIVE | Noted: 2023-05-04

## 2023-05-04 PROCEDURE — G8420 CALC BMI NORM PARAMETERS: HCPCS | Performed by: NEUROLOGICAL SURGERY

## 2023-05-04 PROCEDURE — G8400 PT W/DXA NO RESULTS DOC: HCPCS | Performed by: NEUROLOGICAL SURGERY

## 2023-05-04 PROCEDURE — 1090F PRES/ABSN URINE INCON ASSESS: CPT | Performed by: NEUROLOGICAL SURGERY

## 2023-05-04 PROCEDURE — 3017F COLORECTAL CA SCREEN DOC REV: CPT | Performed by: NEUROLOGICAL SURGERY

## 2023-05-04 PROCEDURE — G8427 DOCREV CUR MEDS BY ELIG CLIN: HCPCS | Performed by: NEUROLOGICAL SURGERY

## 2023-05-04 PROCEDURE — 1036F TOBACCO NON-USER: CPT | Performed by: NEUROLOGICAL SURGERY

## 2023-05-04 PROCEDURE — 1111F DSCHRG MED/CURRENT MED MERGE: CPT | Performed by: NEUROLOGICAL SURGERY

## 2023-05-04 PROCEDURE — 99214 OFFICE O/P EST MOD 30 MIN: CPT | Performed by: NEUROLOGICAL SURGERY

## 2023-05-04 PROCEDURE — 1123F ACP DISCUSS/DSCN MKR DOCD: CPT | Performed by: NEUROLOGICAL SURGERY

## 2023-05-04 ASSESSMENT — ENCOUNTER SYMPTOMS
BACK PAIN: 0
SHORTNESS OF BREATH: 0
EYE PAIN: 0
NAUSEA: 0

## 2024-04-05 ENCOUNTER — OFFICE VISIT (OUTPATIENT)
Dept: FAMILY MEDICINE CLINIC | Age: 69
End: 2024-04-05
Payer: MEDICARE

## 2024-04-05 VITALS
TEMPERATURE: 98 F | DIASTOLIC BLOOD PRESSURE: 74 MMHG | WEIGHT: 138.8 LBS | OXYGEN SATURATION: 97 % | SYSTOLIC BLOOD PRESSURE: 120 MMHG | HEART RATE: 101 BPM | BODY MASS INDEX: 22.31 KG/M2 | HEIGHT: 66 IN

## 2024-04-05 DIAGNOSIS — L25.9 CONTACT DERMATITIS OF SCALP: Primary | ICD-10-CM

## 2024-04-05 PROCEDURE — 1123F ACP DISCUSS/DSCN MKR DOCD: CPT

## 2024-04-05 PROCEDURE — 99203 OFFICE O/P NEW LOW 30 MIN: CPT

## 2024-04-05 RX ORDER — GABAPENTIN 100 MG/1
100 CAPSULE ORAL
COMMUNITY
Start: 2024-03-09 | End: 2024-06-07

## 2024-04-05 RX ORDER — FLUOCINONIDE TOPICAL SOLUTION USP, 0.05% 0.5 MG/ML
SOLUTION TOPICAL
Qty: 60 ML | Refills: 1 | Status: SHIPPED | OUTPATIENT
Start: 2024-04-05

## 2024-04-05 SDOH — ECONOMIC STABILITY: FOOD INSECURITY: WITHIN THE PAST 12 MONTHS, YOU WORRIED THAT YOUR FOOD WOULD RUN OUT BEFORE YOU GOT MONEY TO BUY MORE.: NEVER TRUE

## 2024-04-05 SDOH — ECONOMIC STABILITY: INCOME INSECURITY: HOW HARD IS IT FOR YOU TO PAY FOR THE VERY BASICS LIKE FOOD, HOUSING, MEDICAL CARE, AND HEATING?: NOT HARD AT ALL

## 2024-04-05 SDOH — ECONOMIC STABILITY: FOOD INSECURITY: WITHIN THE PAST 12 MONTHS, THE FOOD YOU BOUGHT JUST DIDN'T LAST AND YOU DIDN'T HAVE MONEY TO GET MORE.: NEVER TRUE

## 2024-04-05 SDOH — ECONOMIC STABILITY: HOUSING INSECURITY
IN THE LAST 12 MONTHS, WAS THERE A TIME WHEN YOU DID NOT HAVE A STEADY PLACE TO SLEEP OR SLEPT IN A SHELTER (INCLUDING NOW)?: NO

## 2024-04-05 ASSESSMENT — PATIENT HEALTH QUESTIONNAIRE - PHQ9
2. FEELING DOWN, DEPRESSED OR HOPELESS: NEARLY EVERY DAY
SUM OF ALL RESPONSES TO PHQ QUESTIONS 1-9: 5
7. TROUBLE CONCENTRATING ON THINGS, SUCH AS READING THE NEWSPAPER OR WATCHING TELEVISION: SEVERAL DAYS
5. POOR APPETITE OR OVEREATING: NOT AT ALL
10. IF YOU CHECKED OFF ANY PROBLEMS, HOW DIFFICULT HAVE THESE PROBLEMS MADE IT FOR YOU TO DO YOUR WORK, TAKE CARE OF THINGS AT HOME, OR GET ALONG WITH OTHER PEOPLE: SOMEWHAT DIFFICULT
SUM OF ALL RESPONSES TO PHQ9 QUESTIONS 1 & 2: 3
1. LITTLE INTEREST OR PLEASURE IN DOING THINGS: NOT AT ALL
SUM OF ALL RESPONSES TO PHQ QUESTIONS 1-9: 5
6. FEELING BAD ABOUT YOURSELF - OR THAT YOU ARE A FAILURE OR HAVE LET YOURSELF OR YOUR FAMILY DOWN: NOT AT ALL
9. THOUGHTS THAT YOU WOULD BE BETTER OFF DEAD, OR OF HURTING YOURSELF: NOT AT ALL
8. MOVING OR SPEAKING SO SLOWLY THAT OTHER PEOPLE COULD HAVE NOTICED. OR THE OPPOSITE, BEING SO FIGETY OR RESTLESS THAT YOU HAVE BEEN MOVING AROUND A LOT MORE THAN USUAL: NOT AT ALL
SUM OF ALL RESPONSES TO PHQ QUESTIONS 1-9: 5
3. TROUBLE FALLING OR STAYING ASLEEP: NOT AT ALL
4. FEELING TIRED OR HAVING LITTLE ENERGY: SEVERAL DAYS
SUM OF ALL RESPONSES TO PHQ QUESTIONS 1-9: 5

## 2024-04-05 NOTE — PROGRESS NOTES
[facility] PRIMARY CARE          ASSESSMENT/PLAN     Kristie Jason is a 68 y.o. female who presents with:  Chief Complaint   Patient presents with    Rash     Rash on the scalp, pt noticed it about a month ago wants to have it looked at to see if its serious or not      Scalp irritation starting approximately 1 month ago.  She reports she gets gritty crusting flaking skin.  The rash itches.  On examination there is mild irritation to the scalp throughout currently no discharge there is no prior erythremia.  Patient does report having hair colored approximately the same time as onset of rash.        1. Contact dermatitis of scalp  -     fluocinonide (LIDEX) 0.05 % external solution; Apply topically 2 times daily. For 5 days, Disp-60 mL, R-1, Normal        PATIENT EDUCATION:    Do not use any harsh chemicals on your scalp for at least 2 weeks          PATIENT REFERRED TO:    No follow-ups on file.    DISCHARGE MEDICATIONS:  New Prescriptions    FLUOCINONIDE (LIDEX) 0.05 % EXTERNAL SOLUTION    Apply topically 2 times daily. For 5 days     Cannot display discharge medications since this is not an admission.       Shukri Payne, APRN - CNP      SUBJECTIVE/REVIEW OF SYSTEMS     Review of Systems   Constitutional: Negative.    Eyes: Negative.    Respiratory: Negative.     Cardiovascular: Negative.    Endocrine: Negative.    Skin:  Positive for rash. Negative for color change and wound.   Neurological: Negative.    Hematological:  Negative for adenopathy. Does not bruise/bleed easily.   Psychiatric/Behavioral: Negative.         OBJECTIVE/PHYSICAL EXAM     Physical Exam  Vitals reviewed.   Constitutional:       Appearance: Normal appearance.   HENT:      Head: Normocephalic.      Mouth/Throat:      Mouth: Mucous membranes are moist.   Eyes:      Conjunctiva/sclera: Conjunctivae normal.   Cardiovascular:      Rate and Rhythm: Normal rate.   Pulmonary:      Effort: Pulmonary effort is normal.   Musculoskeletal:

## 2024-04-09 ASSESSMENT — ENCOUNTER SYMPTOMS
RESPIRATORY NEGATIVE: 1
EYES NEGATIVE: 1
COLOR CHANGE: 0

## 2024-04-16 DIAGNOSIS — L25.9 CONTACT DERMATITIS OF SCALP: ICD-10-CM

## 2024-04-16 RX ORDER — FLUOCINONIDE TOPICAL SOLUTION USP, 0.05% 0.5 MG/ML
SOLUTION TOPICAL
Qty: 60 ML | Refills: 1 | OUTPATIENT
Start: 2024-04-16

## 2024-07-26 ENCOUNTER — APPOINTMENT (OUTPATIENT)
Dept: GENERAL RADIOLOGY | Age: 69
DRG: 522 | End: 2024-07-26
Payer: MEDICARE

## 2024-07-26 ENCOUNTER — HOSPITAL ENCOUNTER (INPATIENT)
Age: 69
LOS: 3 days | Discharge: NURSING, REHAB OR LONG-TERM CARE | DRG: 522 | End: 2024-07-30
Attending: STUDENT IN AN ORGANIZED HEALTH CARE EDUCATION/TRAINING PROGRAM | Admitting: SURGERY
Payer: MEDICARE
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
## 2024-07-26 DIAGNOSIS — S72.001A CLOSED FRACTURE OF NECK OF RIGHT FEMUR, INITIAL ENCOUNTER (HCC): ICD-10-CM

## 2024-07-26 LAB
ALBUMIN SERPL-MCNC: 3.9 G/DL (ref 3.5–4.6)
ALP SERPL-CCNC: 67 U/L (ref 40–130)
ALT SERPL-CCNC: 16 U/L (ref 0–33)
ANION GAP SERPL CALCULATED.3IONS-SCNC: 9 MEQ/L (ref 9–15)
APTT PPP: 22.8 SEC (ref 24.4–36.8)
AST SERPL-CCNC: 19 U/L (ref 0–35)
BASOPHILS # BLD: 0 K/UL (ref 0–0.2)
BASOPHILS NFR BLD: 0.2 %
BILIRUB SERPL-MCNC: <0.2 MG/DL (ref 0.2–0.7)
BUN SERPL-MCNC: 33 MG/DL (ref 8–23)
CALCIUM SERPL-MCNC: 8.7 MG/DL (ref 8.5–9.9)
CHLORIDE SERPL-SCNC: 104 MEQ/L (ref 95–107)
CO2 SERPL-SCNC: 25 MEQ/L (ref 20–31)
CREAT SERPL-MCNC: 0.85 MG/DL (ref 0.5–0.9)
EOSINOPHIL # BLD: 0.1 K/UL (ref 0–0.7)
EOSINOPHIL NFR BLD: 1.2 %
ERYTHROCYTE [DISTWIDTH] IN BLOOD BY AUTOMATED COUNT: 13 % (ref 11.5–14.5)
GLOBULIN SER CALC-MCNC: 2.2 G/DL (ref 2.3–3.5)
GLUCOSE SERPL-MCNC: 111 MG/DL (ref 70–99)
HCT VFR BLD AUTO: 36 % (ref 37–47)
HGB BLD-MCNC: 11.5 G/DL (ref 12–16)
INR PPP: 1.1
LYMPHOCYTES # BLD: 0.6 K/UL (ref 1–4.8)
LYMPHOCYTES NFR BLD: 6.1 %
MCH RBC QN AUTO: 29.9 PG (ref 27–31.3)
MCHC RBC AUTO-ENTMCNC: 31.9 % (ref 33–37)
MCV RBC AUTO: 93.8 FL (ref 79.4–94.8)
MONOCYTES # BLD: 0.8 K/UL (ref 0.2–0.8)
MONOCYTES NFR BLD: 9.1 %
NEUTROPHILS # BLD: 7.7 K/UL (ref 1.4–6.5)
NEUTS SEG NFR BLD: 82.9 %
PLATELET # BLD AUTO: 240 K/UL (ref 130–400)
POTASSIUM SERPL-SCNC: 3.9 MEQ/L (ref 3.4–4.9)
PROT SERPL-MCNC: 6.1 G/DL (ref 6.3–8)
PROTHROMBIN TIME: 14 SEC (ref 12.3–14.9)
RBC # BLD AUTO: 3.84 M/UL (ref 4.2–5.4)
SODIUM SERPL-SCNC: 138 MEQ/L (ref 135–144)
WBC # BLD AUTO: 9.3 K/UL (ref 4.8–10.8)

## 2024-07-26 PROCEDURE — 85730 THROMBOPLASTIN TIME PARTIAL: CPT

## 2024-07-26 PROCEDURE — 6830039000 HC L3 TRAUMA ALERT

## 2024-07-26 PROCEDURE — 36415 COLL VENOUS BLD VENIPUNCTURE: CPT

## 2024-07-26 PROCEDURE — 85610 PROTHROMBIN TIME: CPT

## 2024-07-26 PROCEDURE — 80053 COMPREHEN METABOLIC PANEL: CPT

## 2024-07-26 PROCEDURE — 71045 X-RAY EXAM CHEST 1 VIEW: CPT

## 2024-07-26 PROCEDURE — 85025 COMPLETE CBC W/AUTO DIFF WBC: CPT

## 2024-07-26 PROCEDURE — 99285 EMERGENCY DEPT VISIT HI MDM: CPT

## 2024-07-26 PROCEDURE — 93005 ELECTROCARDIOGRAM TRACING: CPT | Performed by: STUDENT IN AN ORGANIZED HEALTH CARE EDUCATION/TRAINING PROGRAM

## 2024-07-26 PROCEDURE — 73552 X-RAY EXAM OF FEMUR 2/>: CPT

## 2024-07-26 PROCEDURE — 73502 X-RAY EXAM HIP UNI 2-3 VIEWS: CPT

## 2024-07-26 RX ORDER — MORPHINE SULFATE 4 MG/ML
4 INJECTION, SOLUTION INTRAMUSCULAR; INTRAVENOUS ONCE
Status: COMPLETED | OUTPATIENT
Start: 2024-07-26 | End: 2024-07-27

## 2024-07-26 ASSESSMENT — LIFESTYLE VARIABLES
HOW OFTEN DO YOU HAVE A DRINK CONTAINING ALCOHOL: NEVER
HOW MANY STANDARD DRINKS CONTAINING ALCOHOL DO YOU HAVE ON A TYPICAL DAY: PATIENT DOES NOT DRINK

## 2024-07-26 ASSESSMENT — PAIN DESCRIPTION - LOCATION: LOCATION: HIP

## 2024-07-26 ASSESSMENT — PAIN - FUNCTIONAL ASSESSMENT: PAIN_FUNCTIONAL_ASSESSMENT: 0-10

## 2024-07-26 ASSESSMENT — PAIN SCALES - GENERAL: PAINLEVEL_OUTOF10: 9

## 2024-07-26 ASSESSMENT — PAIN DESCRIPTION - ORIENTATION: ORIENTATION: RIGHT

## 2024-07-27 ENCOUNTER — APPOINTMENT (OUTPATIENT)
Dept: GENERAL RADIOLOGY | Age: 69
DRG: 522 | End: 2024-07-27
Payer: MEDICARE

## 2024-07-27 ENCOUNTER — ANESTHESIA EVENT (OUTPATIENT)
Dept: OPERATING ROOM | Age: 69
End: 2024-07-27
Payer: MEDICARE

## 2024-07-27 ENCOUNTER — ANESTHESIA (OUTPATIENT)
Dept: OPERATING ROOM | Age: 69
End: 2024-07-27
Payer: MEDICARE

## 2024-07-27 PROBLEM — S72.001A CLOSED DISPLACED FRACTURE OF RIGHT FEMORAL NECK (HCC): Status: ACTIVE | Noted: 2024-07-27

## 2024-07-27 LAB
ABO + RH BLD: NORMAL
ANION GAP SERPL CALCULATED.3IONS-SCNC: 10 MEQ/L (ref 9–15)
BASOPHILS # BLD: 0 K/UL (ref 0–0.2)
BASOPHILS NFR BLD: 0.1 %
BLD GP AB SCN SERPL QL: NORMAL
BUN SERPL-MCNC: 31 MG/DL (ref 8–23)
CALCIUM SERPL-MCNC: 8.5 MG/DL (ref 8.5–9.9)
CHLORIDE SERPL-SCNC: 103 MEQ/L (ref 95–107)
CO2 SERPL-SCNC: 28 MEQ/L (ref 20–31)
CREAT SERPL-MCNC: 0.73 MG/DL (ref 0.5–0.9)
EOSINOPHIL # BLD: 0 K/UL (ref 0–0.7)
EOSINOPHIL NFR BLD: 0.2 %
ERYTHROCYTE [DISTWIDTH] IN BLOOD BY AUTOMATED COUNT: 12.9 % (ref 11.5–14.5)
ETHANOL PERCENT: NORMAL G/DL
ETHANOLAMINE SERPL-MCNC: <10 MG/DL (ref 0–0.08)
GLUCOSE SERPL-MCNC: 128 MG/DL (ref 70–99)
HCT VFR BLD AUTO: 33.9 % (ref 37–47)
HGB BLD-MCNC: 11.4 G/DL (ref 12–16)
LYMPHOCYTES # BLD: 0.6 K/UL (ref 1–4.8)
LYMPHOCYTES NFR BLD: 5.3 %
MCH RBC QN AUTO: 31.2 PG (ref 27–31.3)
MCHC RBC AUTO-ENTMCNC: 33.6 % (ref 33–37)
MCV RBC AUTO: 92.9 FL (ref 79.4–94.8)
MONOCYTES # BLD: 1 K/UL (ref 0.2–0.8)
MONOCYTES NFR BLD: 8.4 %
NEUTROPHILS # BLD: 10 K/UL (ref 1.4–6.5)
NEUTS SEG NFR BLD: 85.7 %
PLATELET # BLD AUTO: 219 K/UL (ref 130–400)
POTASSIUM SERPL-SCNC: 4.1 MEQ/L (ref 3.4–4.9)
RBC # BLD AUTO: 3.65 M/UL (ref 4.2–5.4)
SODIUM SERPL-SCNC: 141 MEQ/L (ref 135–144)
WBC # BLD AUTO: 11.7 K/UL (ref 4.8–10.8)

## 2024-07-27 PROCEDURE — 85025 COMPLETE CBC W/AUTO DIFF WBC: CPT

## 2024-07-27 PROCEDURE — 6360000002 HC RX W HCPCS: Performed by: ANESTHESIOLOGY

## 2024-07-27 PROCEDURE — 2580000003 HC RX 258: Performed by: SURGERY

## 2024-07-27 PROCEDURE — 2580000003 HC RX 258: Performed by: STUDENT IN AN ORGANIZED HEALTH CARE EDUCATION/TRAINING PROGRAM

## 2024-07-27 PROCEDURE — C1776 JOINT DEVICE (IMPLANTABLE): HCPCS | Performed by: STUDENT IN AN ORGANIZED HEALTH CARE EDUCATION/TRAINING PROGRAM

## 2024-07-27 PROCEDURE — 36415 COLL VENOUS BLD VENIPUNCTURE: CPT

## 2024-07-27 PROCEDURE — 6360000002 HC RX W HCPCS: Performed by: STUDENT IN AN ORGANIZED HEALTH CARE EDUCATION/TRAINING PROGRAM

## 2024-07-27 PROCEDURE — 80048 BASIC METABOLIC PNL TOTAL CA: CPT

## 2024-07-27 PROCEDURE — 2709999900 HC NON-CHARGEABLE SUPPLY: Performed by: STUDENT IN AN ORGANIZED HEALTH CARE EDUCATION/TRAINING PROGRAM

## 2024-07-27 PROCEDURE — 72170 X-RAY EXAM OF PELVIS: CPT

## 2024-07-27 PROCEDURE — 0LQJ0ZZ REPAIR RIGHT HIP TENDON, OPEN APPROACH: ICD-10-PCS | Performed by: STUDENT IN AN ORGANIZED HEALTH CARE EDUCATION/TRAINING PROGRAM

## 2024-07-27 PROCEDURE — 3700000001 HC ADD 15 MINUTES (ANESTHESIA): Performed by: STUDENT IN AN ORGANIZED HEALTH CARE EDUCATION/TRAINING PROGRAM

## 2024-07-27 PROCEDURE — 7100000001 HC PACU RECOVERY - ADDTL 15 MIN: Performed by: STUDENT IN AN ORGANIZED HEALTH CARE EDUCATION/TRAINING PROGRAM

## 2024-07-27 PROCEDURE — 1210000000 HC MED SURG R&B

## 2024-07-27 PROCEDURE — 86900 BLOOD TYPING SEROLOGIC ABO: CPT

## 2024-07-27 PROCEDURE — 86850 RBC ANTIBODY SCREEN: CPT

## 2024-07-27 PROCEDURE — 2500000003 HC RX 250 WO HCPCS: Performed by: STUDENT IN AN ORGANIZED HEALTH CARE EDUCATION/TRAINING PROGRAM

## 2024-07-27 PROCEDURE — 7100000000 HC PACU RECOVERY - FIRST 15 MIN: Performed by: STUDENT IN AN ORGANIZED HEALTH CARE EDUCATION/TRAINING PROGRAM

## 2024-07-27 PROCEDURE — 6360000002 HC RX W HCPCS: Performed by: SURGERY

## 2024-07-27 PROCEDURE — 6370000000 HC RX 637 (ALT 250 FOR IP): Performed by: STUDENT IN AN ORGANIZED HEALTH CARE EDUCATION/TRAINING PROGRAM

## 2024-07-27 PROCEDURE — C1713 ANCHOR/SCREW BN/BN,TIS/BN: HCPCS | Performed by: STUDENT IN AN ORGANIZED HEALTH CARE EDUCATION/TRAINING PROGRAM

## 2024-07-27 PROCEDURE — 2500000003 HC RX 250 WO HCPCS: Performed by: ANESTHESIOLOGY

## 2024-07-27 PROCEDURE — 3700000000 HC ANESTHESIA ATTENDED CARE: Performed by: STUDENT IN AN ORGANIZED HEALTH CARE EDUCATION/TRAINING PROGRAM

## 2024-07-27 PROCEDURE — 0SRR0J9 REPLACEMENT OF RIGHT HIP JOINT, FEMORAL SURFACE WITH SYNTHETIC SUBSTITUTE, CEMENTED, OPEN APPROACH: ICD-10-PCS | Performed by: STUDENT IN AN ORGANIZED HEALTH CARE EDUCATION/TRAINING PROGRAM

## 2024-07-27 PROCEDURE — 82077 ASSAY SPEC XCP UR&BREATH IA: CPT

## 2024-07-27 PROCEDURE — 86901 BLOOD TYPING SEROLOGIC RH(D): CPT

## 2024-07-27 PROCEDURE — 3600000014 HC SURGERY LEVEL 4 ADDTL 15MIN: Performed by: STUDENT IN AN ORGANIZED HEALTH CARE EDUCATION/TRAINING PROGRAM

## 2024-07-27 PROCEDURE — 96374 THER/PROPH/DIAG INJ IV PUSH: CPT

## 2024-07-27 PROCEDURE — 2720000010 HC SURG SUPPLY STERILE: Performed by: STUDENT IN AN ORGANIZED HEALTH CARE EDUCATION/TRAINING PROGRAM

## 2024-07-27 PROCEDURE — 99221 1ST HOSP IP/OBS SF/LOW 40: CPT | Performed by: STUDENT IN AN ORGANIZED HEALTH CARE EDUCATION/TRAINING PROGRAM

## 2024-07-27 PROCEDURE — 99223 1ST HOSP IP/OBS HIGH 75: CPT | Performed by: SURGERY

## 2024-07-27 PROCEDURE — 6370000000 HC RX 637 (ALT 250 FOR IP): Performed by: SURGERY

## 2024-07-27 PROCEDURE — 27236 TREAT THIGH FRACTURE: CPT | Performed by: STUDENT IN AN ORGANIZED HEALTH CARE EDUCATION/TRAINING PROGRAM

## 2024-07-27 PROCEDURE — 64447 NJX AA&/STRD FEMORAL NRV IMG: CPT | Performed by: ANESTHESIOLOGY

## 2024-07-27 PROCEDURE — 3600000004 HC SURGERY LEVEL 4 BASE: Performed by: STUDENT IN AN ORGANIZED HEALTH CARE EDUCATION/TRAINING PROGRAM

## 2024-07-27 DEVICE — BONE PREPARATION KIT
Type: IMPLANTABLE DEVICE | Site: HIP | Status: FUNCTIONAL
Brand: BIOPREP

## 2024-07-27 DEVICE — IMPLANTABLE DEVICE: Type: IMPLANTABLE DEVICE | Site: HIP | Status: FUNCTIONAL

## 2024-07-27 DEVICE — HEAD FEM OD44MM ID26MM HIP CO CHROM POLYETH BPLR CEMENTLESS: Type: IMPLANTABLE DEVICE | Site: HIP | Status: FUNCTIONAL

## 2024-07-27 RX ORDER — BISACODYL 5 MG/1
5 TABLET, DELAYED RELEASE ORAL DAILY PRN
Status: DISCONTINUED | OUTPATIENT
Start: 2024-07-27 | End: 2024-07-30 | Stop reason: HOSPADM

## 2024-07-27 RX ORDER — MIDAZOLAM HYDROCHLORIDE 1 MG/ML
INJECTION INTRAMUSCULAR; INTRAVENOUS PRN
Status: DISCONTINUED | OUTPATIENT
Start: 2024-07-27 | End: 2024-07-27 | Stop reason: SDUPTHER

## 2024-07-27 RX ORDER — NALOXONE HYDROCHLORIDE 0.4 MG/ML
INJECTION, SOLUTION INTRAMUSCULAR; INTRAVENOUS; SUBCUTANEOUS PRN
Status: DISCONTINUED | OUTPATIENT
Start: 2024-07-27 | End: 2024-07-27 | Stop reason: HOSPADM

## 2024-07-27 RX ORDER — DEXAMETHASONE SODIUM PHOSPHATE 10 MG/ML
INJECTION INTRAMUSCULAR; INTRAVENOUS PRN
Status: DISCONTINUED | OUTPATIENT
Start: 2024-07-27 | End: 2024-07-27 | Stop reason: SDUPTHER

## 2024-07-27 RX ORDER — DEXTROSE MONOHYDRATE 100 MG/ML
INJECTION, SOLUTION INTRAVENOUS CONTINUOUS PRN
Status: DISCONTINUED | OUTPATIENT
Start: 2024-07-27 | End: 2024-07-27 | Stop reason: HOSPADM

## 2024-07-27 RX ORDER — SODIUM CHLORIDE 0.9 % (FLUSH) 0.9 %
5-40 SYRINGE (ML) INJECTION EVERY 12 HOURS SCHEDULED
Status: DISCONTINUED | OUTPATIENT
Start: 2024-07-27 | End: 2024-07-27 | Stop reason: HOSPADM

## 2024-07-27 RX ORDER — SODIUM CHLORIDE 9 MG/ML
INJECTION, SOLUTION INTRAVENOUS PRN
Status: DISCONTINUED | OUTPATIENT
Start: 2024-07-27 | End: 2024-07-30 | Stop reason: HOSPADM

## 2024-07-27 RX ORDER — OXYCODONE HYDROCHLORIDE 5 MG/1
10 TABLET ORAL PRN
Status: DISCONTINUED | OUTPATIENT
Start: 2024-07-27 | End: 2024-07-27 | Stop reason: HOSPADM

## 2024-07-27 RX ORDER — POTASSIUM CHLORIDE 7.45 MG/ML
10 INJECTION INTRAVENOUS PRN
Status: DISCONTINUED | OUTPATIENT
Start: 2024-07-27 | End: 2024-07-30 | Stop reason: HOSPADM

## 2024-07-27 RX ORDER — OXYCODONE HYDROCHLORIDE 5 MG/1
2.5 TABLET ORAL EVERY 4 HOURS PRN
Status: DISCONTINUED | OUTPATIENT
Start: 2024-07-27 | End: 2024-07-28

## 2024-07-27 RX ORDER — ROCURONIUM BROMIDE 10 MG/ML
INJECTION, SOLUTION INTRAVENOUS PRN
Status: DISCONTINUED | OUTPATIENT
Start: 2024-07-27 | End: 2024-07-27 | Stop reason: SDUPTHER

## 2024-07-27 RX ORDER — GLUCAGON 1 MG/ML
1 KIT INJECTION PRN
Status: DISCONTINUED | OUTPATIENT
Start: 2024-07-27 | End: 2024-07-27 | Stop reason: HOSPADM

## 2024-07-27 RX ORDER — ONDANSETRON 4 MG/1
4 TABLET, ORALLY DISINTEGRATING ORAL EVERY 8 HOURS PRN
Status: DISCONTINUED | OUTPATIENT
Start: 2024-07-27 | End: 2024-07-30 | Stop reason: HOSPADM

## 2024-07-27 RX ORDER — ROPIVACAINE HYDROCHLORIDE 5 MG/ML
INJECTION, SOLUTION EPIDURAL; INFILTRATION; PERINEURAL
Status: COMPLETED | OUTPATIENT
Start: 2024-07-27 | End: 2024-07-27

## 2024-07-27 RX ORDER — POTASSIUM CHLORIDE 29.8 MG/ML
20 INJECTION INTRAVENOUS PRN
Status: DISCONTINUED | OUTPATIENT
Start: 2024-07-27 | End: 2024-07-30 | Stop reason: HOSPADM

## 2024-07-27 RX ORDER — HYDRALAZINE HYDROCHLORIDE 20 MG/ML
10 INJECTION INTRAMUSCULAR; INTRAVENOUS
Status: DISCONTINUED | OUTPATIENT
Start: 2024-07-27 | End: 2024-07-27 | Stop reason: HOSPADM

## 2024-07-27 RX ORDER — SODIUM CHLORIDE 0.9 % (FLUSH) 0.9 %
5-40 SYRINGE (ML) INJECTION EVERY 12 HOURS SCHEDULED
Status: DISCONTINUED | OUTPATIENT
Start: 2024-07-27 | End: 2024-07-30 | Stop reason: HOSPADM

## 2024-07-27 RX ORDER — METOCLOPRAMIDE HYDROCHLORIDE 5 MG/ML
10 INJECTION INTRAMUSCULAR; INTRAVENOUS
Status: DISCONTINUED | OUTPATIENT
Start: 2024-07-27 | End: 2024-07-27 | Stop reason: HOSPADM

## 2024-07-27 RX ORDER — ONDANSETRON 2 MG/ML
INJECTION INTRAMUSCULAR; INTRAVENOUS PRN
Status: DISCONTINUED | OUTPATIENT
Start: 2024-07-27 | End: 2024-07-27 | Stop reason: SDUPTHER

## 2024-07-27 RX ORDER — MORPHINE SULFATE 4 MG/ML
4 INJECTION, SOLUTION INTRAMUSCULAR; INTRAVENOUS EVERY 4 HOURS PRN
Status: DISCONTINUED | OUTPATIENT
Start: 2024-07-27 | End: 2024-07-27

## 2024-07-27 RX ORDER — OXYCODONE HYDROCHLORIDE 5 MG/1
5 TABLET ORAL EVERY 4 HOURS PRN
Status: DISCONTINUED | OUTPATIENT
Start: 2024-07-27 | End: 2024-07-28

## 2024-07-27 RX ORDER — ENOXAPARIN SODIUM 100 MG/ML
40 INJECTION SUBCUTANEOUS DAILY
Status: DISCONTINUED | OUTPATIENT
Start: 2024-07-27 | End: 2024-07-27

## 2024-07-27 RX ORDER — FENTANYL CITRATE 0.05 MG/ML
25 INJECTION, SOLUTION INTRAMUSCULAR; INTRAVENOUS EVERY 5 MIN PRN
Status: DISCONTINUED | OUTPATIENT
Start: 2024-07-27 | End: 2024-07-27 | Stop reason: HOSPADM

## 2024-07-27 RX ORDER — METHOCARBAMOL 500 MG/1
500 TABLET, FILM COATED ORAL EVERY 6 HOURS
Status: DISCONTINUED | OUTPATIENT
Start: 2024-07-27 | End: 2024-07-30 | Stop reason: HOSPADM

## 2024-07-27 RX ORDER — SODIUM CHLORIDE 0.9 % (FLUSH) 0.9 %
5-40 SYRINGE (ML) INJECTION PRN
Status: DISCONTINUED | OUTPATIENT
Start: 2024-07-27 | End: 2024-07-27 | Stop reason: HOSPADM

## 2024-07-27 RX ORDER — OXYCODONE HYDROCHLORIDE 5 MG/1
5 TABLET ORAL PRN
Status: DISCONTINUED | OUTPATIENT
Start: 2024-07-27 | End: 2024-07-27 | Stop reason: HOSPADM

## 2024-07-27 RX ORDER — AMITRIPTYLINE HYDROCHLORIDE 25 MG/1
25 TABLET, FILM COATED ORAL NIGHTLY
Status: DISCONTINUED | OUTPATIENT
Start: 2024-07-27 | End: 2024-07-30 | Stop reason: HOSPADM

## 2024-07-27 RX ORDER — IPRATROPIUM BROMIDE AND ALBUTEROL SULFATE 2.5; .5 MG/3ML; MG/3ML
1 SOLUTION RESPIRATORY (INHALATION)
Status: DISCONTINUED | OUTPATIENT
Start: 2024-07-27 | End: 2024-07-27 | Stop reason: HOSPADM

## 2024-07-27 RX ORDER — HYDROMORPHONE HYDROCHLORIDE 1 MG/ML
1 INJECTION, SOLUTION INTRAMUSCULAR; INTRAVENOUS; SUBCUTANEOUS ONCE
Status: COMPLETED | OUTPATIENT
Start: 2024-07-27 | End: 2024-07-27

## 2024-07-27 RX ORDER — PROPOFOL 10 MG/ML
INJECTION, EMULSION INTRAVENOUS PRN
Status: DISCONTINUED | OUTPATIENT
Start: 2024-07-27 | End: 2024-07-27 | Stop reason: SDUPTHER

## 2024-07-27 RX ORDER — MAGNESIUM HYDROXIDE 1200 MG/15ML
LIQUID ORAL CONTINUOUS PRN
Status: DISCONTINUED | OUTPATIENT
Start: 2024-07-27 | End: 2024-07-27 | Stop reason: HOSPADM

## 2024-07-27 RX ORDER — SODIUM CHLORIDE 0.9 % (FLUSH) 0.9 %
5-40 SYRINGE (ML) INJECTION PRN
Status: DISCONTINUED | OUTPATIENT
Start: 2024-07-27 | End: 2024-07-30 | Stop reason: HOSPADM

## 2024-07-27 RX ORDER — LABETALOL HYDROCHLORIDE 5 MG/ML
10 INJECTION, SOLUTION INTRAVENOUS
Status: DISCONTINUED | OUTPATIENT
Start: 2024-07-27 | End: 2024-07-27 | Stop reason: HOSPADM

## 2024-07-27 RX ORDER — ACETAMINOPHEN 325 MG/1
650 TABLET ORAL EVERY 6 HOURS
Status: DISCONTINUED | OUTPATIENT
Start: 2024-07-27 | End: 2024-07-30 | Stop reason: HOSPADM

## 2024-07-27 RX ORDER — SODIUM CHLORIDE, SODIUM LACTATE, POTASSIUM CHLORIDE, CALCIUM CHLORIDE 600; 310; 30; 20 MG/100ML; MG/100ML; MG/100ML; MG/100ML
INJECTION, SOLUTION INTRAVENOUS CONTINUOUS
Status: DISCONTINUED | OUTPATIENT
Start: 2024-07-27 | End: 2024-07-28

## 2024-07-27 RX ORDER — SODIUM CHLORIDE 0.9 % (FLUSH) 0.9 %
5-40 SYRINGE (ML) INJECTION EVERY 12 HOURS SCHEDULED
Status: DISCONTINUED | OUTPATIENT
Start: 2024-07-27 | End: 2024-07-27

## 2024-07-27 RX ORDER — POLYETHYLENE GLYCOL 3350 17 G/17G
17 POWDER, FOR SOLUTION ORAL DAILY
Status: DISCONTINUED | OUTPATIENT
Start: 2024-07-27 | End: 2024-07-30 | Stop reason: HOSPADM

## 2024-07-27 RX ORDER — MAGNESIUM SULFATE IN WATER 40 MG/ML
2000 INJECTION, SOLUTION INTRAVENOUS PRN
Status: DISCONTINUED | OUTPATIENT
Start: 2024-07-27 | End: 2024-07-30 | Stop reason: HOSPADM

## 2024-07-27 RX ORDER — FENTANYL CITRATE 50 UG/ML
INJECTION, SOLUTION INTRAMUSCULAR; INTRAVENOUS PRN
Status: DISCONTINUED | OUTPATIENT
Start: 2024-07-27 | End: 2024-07-27 | Stop reason: SDUPTHER

## 2024-07-27 RX ORDER — ONDANSETRON 2 MG/ML
4 INJECTION INTRAMUSCULAR; INTRAVENOUS
Status: DISCONTINUED | OUTPATIENT
Start: 2024-07-27 | End: 2024-07-27 | Stop reason: HOSPADM

## 2024-07-27 RX ORDER — ONDANSETRON 2 MG/ML
4 INJECTION INTRAMUSCULAR; INTRAVENOUS EVERY 6 HOURS PRN
Status: DISCONTINUED | OUTPATIENT
Start: 2024-07-27 | End: 2024-07-27

## 2024-07-27 RX ORDER — SODIUM CHLORIDE 9 MG/ML
INJECTION, SOLUTION INTRAVENOUS PRN
Status: DISCONTINUED | OUTPATIENT
Start: 2024-07-27 | End: 2024-07-27 | Stop reason: HOSPADM

## 2024-07-27 RX ORDER — ONDANSETRON 4 MG/1
4 TABLET, ORALLY DISINTEGRATING ORAL EVERY 8 HOURS PRN
Status: DISCONTINUED | OUTPATIENT
Start: 2024-07-27 | End: 2024-07-27

## 2024-07-27 RX ORDER — ENOXAPARIN SODIUM 100 MG/ML
40 INJECTION SUBCUTANEOUS DAILY
Status: DISCONTINUED | OUTPATIENT
Start: 2024-07-28 | End: 2024-07-28

## 2024-07-27 RX ORDER — VENLAFAXINE HYDROCHLORIDE 150 MG/1
300 CAPSULE, EXTENDED RELEASE ORAL DAILY
Status: DISCONTINUED | OUTPATIENT
Start: 2024-07-27 | End: 2024-07-30 | Stop reason: HOSPADM

## 2024-07-27 RX ORDER — ONDANSETRON 2 MG/ML
4 INJECTION INTRAMUSCULAR; INTRAVENOUS EVERY 6 HOURS PRN
Status: DISCONTINUED | OUTPATIENT
Start: 2024-07-27 | End: 2024-07-30 | Stop reason: HOSPADM

## 2024-07-27 RX ORDER — SODIUM CHLORIDE 9 MG/ML
INJECTION, SOLUTION INTRAVENOUS CONTINUOUS
Status: DISCONTINUED | OUTPATIENT
Start: 2024-07-27 | End: 2024-07-27

## 2024-07-27 RX ORDER — MEPERIDINE HYDROCHLORIDE 25 MG/ML
12.5 INJECTION INTRAMUSCULAR; INTRAVENOUS; SUBCUTANEOUS EVERY 5 MIN PRN
Status: DISCONTINUED | OUTPATIENT
Start: 2024-07-27 | End: 2024-07-27 | Stop reason: HOSPADM

## 2024-07-27 RX ORDER — SENNA AND DOCUSATE SODIUM 50; 8.6 MG/1; MG/1
1 TABLET, FILM COATED ORAL 2 TIMES DAILY
Status: DISCONTINUED | OUTPATIENT
Start: 2024-07-27 | End: 2024-07-30 | Stop reason: HOSPADM

## 2024-07-27 RX ORDER — ATORVASTATIN CALCIUM 20 MG/1
20 TABLET, FILM COATED ORAL DAILY
Status: DISCONTINUED | OUTPATIENT
Start: 2024-07-27 | End: 2024-07-30 | Stop reason: HOSPADM

## 2024-07-27 RX ORDER — BUPROPION HYDROCHLORIDE 300 MG/1
300 TABLET ORAL EVERY MORNING
Status: DISCONTINUED | OUTPATIENT
Start: 2024-07-27 | End: 2024-07-30 | Stop reason: HOSPADM

## 2024-07-27 RX ORDER — SODIUM CHLORIDE 9 MG/ML
INJECTION, SOLUTION INTRAVENOUS PRN
Status: DISCONTINUED | OUTPATIENT
Start: 2024-07-27 | End: 2024-07-27

## 2024-07-27 RX ORDER — ACETAMINOPHEN 325 MG/1
650 TABLET ORAL EVERY 4 HOURS PRN
Status: DISCONTINUED | OUTPATIENT
Start: 2024-07-27 | End: 2024-07-27

## 2024-07-27 RX ORDER — MORPHINE SULFATE 4 MG/ML
4 INJECTION, SOLUTION INTRAMUSCULAR; INTRAVENOUS
Status: DISCONTINUED | OUTPATIENT
Start: 2024-07-27 | End: 2024-07-27

## 2024-07-27 RX ADMIN — ROCURONIUM BROMIDE 50 MG: 10 INJECTION, SOLUTION INTRAVENOUS at 12:14

## 2024-07-27 RX ADMIN — HYDROMORPHONE HYDROCHLORIDE 0.5 MG: 1 INJECTION, SOLUTION INTRAMUSCULAR; INTRAVENOUS; SUBCUTANEOUS at 08:33

## 2024-07-27 RX ADMIN — OXYCODONE 5 MG: 5 TABLET ORAL at 21:13

## 2024-07-27 RX ADMIN — ONDANSETRON 4 MG: 2 INJECTION INTRAMUSCULAR; INTRAVENOUS at 13:52

## 2024-07-27 RX ADMIN — FENTANYL CITRATE 25 MCG: 50 INJECTION, SOLUTION INTRAMUSCULAR; INTRAVENOUS at 13:08

## 2024-07-27 RX ADMIN — TRANEXAMIC ACID 1000 MG: 100 INJECTION, SOLUTION INTRAVENOUS at 16:11

## 2024-07-27 RX ADMIN — DEXAMETHASONE SODIUM PHOSPHATE 10 MG: 10 INJECTION INTRAMUSCULAR; INTRAVENOUS at 12:39

## 2024-07-27 RX ADMIN — HYDROMORPHONE HYDROCHLORIDE 0.5 MG: 1 INJECTION, SOLUTION INTRAMUSCULAR; INTRAVENOUS; SUBCUTANEOUS at 18:45

## 2024-07-27 RX ADMIN — FENTANYL CITRATE 50 MCG: 50 INJECTION, SOLUTION INTRAMUSCULAR; INTRAVENOUS at 12:14

## 2024-07-27 RX ADMIN — METHOCARBAMOL 500 MG: 500 TABLET ORAL at 15:56

## 2024-07-27 RX ADMIN — TRANEXAMIC ACID 1000 MG: 100 INJECTION, SOLUTION INTRAVENOUS at 12:30

## 2024-07-27 RX ADMIN — AMITRIPTYLINE HYDROCHLORIDE 25 MG: 25 TABLET, FILM COATED ORAL at 20:13

## 2024-07-27 RX ADMIN — SODIUM CHLORIDE, POTASSIUM CHLORIDE, SODIUM LACTATE AND CALCIUM CHLORIDE: 600; 310; 30; 20 INJECTION, SOLUTION INTRAVENOUS at 16:16

## 2024-07-27 RX ADMIN — TRANEXAMIC ACID 1000 MG: 100 INJECTION, SOLUTION INTRAVENOUS at 13:44

## 2024-07-27 RX ADMIN — MIDAZOLAM HYDROCHLORIDE 2 MG: 1 INJECTION, SOLUTION INTRAMUSCULAR; INTRAVENOUS at 10:48

## 2024-07-27 RX ADMIN — METHOCARBAMOL 500 MG: 500 TABLET ORAL at 20:13

## 2024-07-27 RX ADMIN — FENTANYL CITRATE 25 MCG: 50 INJECTION, SOLUTION INTRAMUSCULAR; INTRAVENOUS at 12:48

## 2024-07-27 RX ADMIN — CEFAZOLIN 2000 MG: 2 INJECTION, POWDER, FOR SOLUTION INTRAMUSCULAR; INTRAVENOUS at 12:22

## 2024-07-27 RX ADMIN — ACETAMINOPHEN 650 MG: 325 TABLET ORAL at 15:59

## 2024-07-27 RX ADMIN — OXYCODONE 5 MG: 5 TABLET ORAL at 15:57

## 2024-07-27 RX ADMIN — ACETAMINOPHEN 650 MG: 325 TABLET ORAL at 03:46

## 2024-07-27 RX ADMIN — VENLAFAXINE HYDROCHLORIDE 300 MG: 150 CAPSULE, EXTENDED RELEASE ORAL at 15:59

## 2024-07-27 RX ADMIN — SUGAMMADEX 200 MG: 100 INJECTION, SOLUTION INTRAVENOUS at 13:56

## 2024-07-27 RX ADMIN — ACETAMINOPHEN 650 MG: 325 TABLET ORAL at 20:13

## 2024-07-27 RX ADMIN — PROPOFOL 120 MG: 10 INJECTION, EMULSION INTRAVENOUS at 12:14

## 2024-07-27 RX ADMIN — MORPHINE SULFATE 4 MG: 4 INJECTION, SOLUTION INTRAMUSCULAR; INTRAVENOUS at 00:19

## 2024-07-27 RX ADMIN — ROPIVACAINE HYDROCHLORIDE 25 ML: 5 INJECTION, SOLUTION EPIDURAL; INFILTRATION; PERINEURAL at 10:48

## 2024-07-27 RX ADMIN — BUPROPION HYDROCHLORIDE 300 MG: 300 TABLET, EXTENDED RELEASE ORAL at 16:10

## 2024-07-27 RX ADMIN — HYDROMORPHONE HYDROCHLORIDE 1 MG: 1 INJECTION, SOLUTION INTRAMUSCULAR; INTRAVENOUS; SUBCUTANEOUS at 01:48

## 2024-07-27 RX ADMIN — SODIUM CHLORIDE, POTASSIUM CHLORIDE, SODIUM LACTATE AND CALCIUM CHLORIDE: 600; 310; 30; 20 INJECTION, SOLUTION INTRAVENOUS at 03:52

## 2024-07-27 RX ADMIN — FENTANYL CITRATE 25 MCG: 0.05 INJECTION, SOLUTION INTRAMUSCULAR; INTRAVENOUS at 14:36

## 2024-07-27 RX ADMIN — HYDROMORPHONE HYDROCHLORIDE 0.5 MG: 1 INJECTION, SOLUTION INTRAMUSCULAR; INTRAVENOUS; SUBCUTANEOUS at 03:46

## 2024-07-27 RX ADMIN — ATORVASTATIN CALCIUM 20 MG: 20 TABLET, FILM COATED ORAL at 15:56

## 2024-07-27 RX ADMIN — METHOCARBAMOL 500 MG: 500 TABLET ORAL at 03:46

## 2024-07-27 ASSESSMENT — PAIN SCALES - GENERAL
PAINLEVEL_OUTOF10: 6
PAINLEVEL_OUTOF10: 8
PAINLEVEL_OUTOF10: 8
PAINLEVEL_OUTOF10: 6
PAINLEVEL_OUTOF10: 7
PAINLEVEL_OUTOF10: 9
PAINLEVEL_OUTOF10: 6
PAINLEVEL_OUTOF10: 9
PAINLEVEL_OUTOF10: 8
PAINLEVEL_OUTOF10: 5
PAINLEVEL_OUTOF10: 8

## 2024-07-27 ASSESSMENT — PAIN DESCRIPTION - LOCATION
LOCATION: HIP
LOCATION: HIP;GROIN;LEG
LOCATION: HIP
LOCATION: HIP;LEG
LOCATION: HIP

## 2024-07-27 ASSESSMENT — PAIN DESCRIPTION - ORIENTATION
ORIENTATION: RIGHT

## 2024-07-27 ASSESSMENT — PAIN DESCRIPTION - ONSET
ONSET: PROGRESSIVE
ONSET: ON-GOING

## 2024-07-27 ASSESSMENT — PAIN DESCRIPTION - DESCRIPTORS
DESCRIPTORS: ACHING
DESCRIPTORS: SORE;STABBING
DESCRIPTORS: SORE
DESCRIPTORS: THROBBING

## 2024-07-27 ASSESSMENT — PAIN DESCRIPTION - PAIN TYPE
TYPE: ACUTE PAIN
TYPE: SURGICAL PAIN

## 2024-07-27 ASSESSMENT — PAIN DESCRIPTION - FREQUENCY
FREQUENCY: CONTINUOUS
FREQUENCY: INTERMITTENT

## 2024-07-27 ASSESSMENT — PAIN SCALES - WONG BAKER
WONGBAKER_NUMERICALRESPONSE: HURTS EVEN MORE
WONGBAKER_NUMERICALRESPONSE: HURTS WHOLE LOT
WONGBAKER_NUMERICALRESPONSE: HURTS WHOLE LOT

## 2024-07-27 ASSESSMENT — PAIN - FUNCTIONAL ASSESSMENT
PAIN_FUNCTIONAL_ASSESSMENT: ACTIVITIES ARE NOT PREVENTED
PAIN_FUNCTIONAL_ASSESSMENT: ACTIVITIES ARE NOT PREVENTED

## 2024-07-27 NOTE — ED TRIAGE NOTES
Patient to ED via EMS.     Patient stated she was walking and tripped over her cat and fell on her right hip.     Patient denies hitting her head.     Patient is alert and oriented on arrival to ED.     Patient -thinners.     Patient received 30mg of Ketamine, 75mcg of Fentanyl, and 4mg of Zofran by EMS in route to ED.     Patient m,s,p in tact on arrival to ED.

## 2024-07-27 NOTE — ANESTHESIA POSTPROCEDURE EVALUATION
Department of Anesthesiology  Postprocedure Note    Patient: Kristie Jason  MRN: [id number]  YOB: 1955  Date of evaluation: 7/27/2024    Procedure Summary       Date: 07/27/24 Room / Location: [address]    Anesthesia Start: 1207 Anesthesia Stop: 1415    Procedure: RIGHT HIP HEMIARTHROPLASTY (Right: Hip) Diagnosis:       Fracture, hip, right, closed, initial encounter (HCC)      (Fracture, hip, right, closed, initial encounter (HCC) [S72.001A])    Surgeons: Kaelyn Walter MD Responsible Provider: Raghu Gordon MD    Anesthesia Type: General, Regional ASA Status: 2            Anesthesia Type: General, Regional    Ben Phase I:      Ben Phase II:      Anesthesia Post Evaluation    Patient location during evaluation: bedside  Patient participation: complete - patient participated  Level of consciousness: awake and awake and alert  Airway patency: patent  Nausea & Vomiting: no nausea and no vomiting  Cardiovascular status: blood pressure returned to baseline and hemodynamically stable  Respiratory status: acceptable  Hydration status: euvolemic  Pain management: adequate    No notable events documented.

## 2024-07-27 NOTE — OP NOTE
exposed, and held out of the wound using a femoral elevator retractor.  A ronjour was used to remove any excess bone at the medial side of the greater trochanter.  This was followed by the “cookie cutter” to remove any remaining lateral femoral neck, and then the canal finder.  A sequential broach system Accolade C was utilized, being careful to lateralize the broach in order to prevent varus positioning of the component.  The abductor musculature was protected with each broaching effort.  Once a tight fit was identified, both in rotational and axial stability planes, the appropriate broach size was retained in the femur for future trialing, with plans to use a cement version of an 3 cemented stem.  The leg was then laid back down on the table.    A trial trunion and head, utilizing various angles and lengths as needed, was trialed until a combination was noted to give the most stable ROM with no sign of impingement or dislocation. The components trialed were a 44mm head, -3 insert, Accolade C femur.  This construct achieved adequate stability, did not feel overtly tight.   Leg lengths were verified using both the EKG lead on the contralateral leg. Abductor tension and quadriceps tension was also taken into account, to best reproduce soft tissue tension that had been present prior to surgery.      Once final components were selected, they were opened on the back table in the usual sterile fashion.. The final component was then placed into the canal, and seated into the same place/level as the trial.  The rotation of the femoral head was essentially at the level of the greater trochanter, and 15-20? of anteversion.  The head was impacted into place after cleaning and drying the trunion of all debris and moisture.  The femoral head was re-located into the acetabulum under the guidance of the surgeon, with no debris noted to be incarcerated in the articulating junction.  Once the final components were in place, the

## 2024-07-27 NOTE — ANESTHESIA PROCEDURE NOTES
Peripheral Block    Patient location during procedure: pre-op  Reason for block: post-op pain management and at surgeon's request  Start time: 7/27/2024 10:48 AM  End time: 7/27/2024 10:54 AM  Staffing  Performed: anesthesiologist   Anesthesiologist: Raghu Gordon MD  Performed by: Raghu Gordon MD  Authorized by: Raghu Gordon MD    Preanesthetic Checklist  Completed: patient identified, IV checked, site marked, risks and benefits discussed, surgical/procedural consents, equipment checked, pre-op evaluation, timeout performed, anesthesia consent given, oxygen available and monitors applied/VS acknowledged  Peripheral Block   Patient position: supine  Prep: ChloraPrep  Provider prep: mask and sterile gloves (Sterile probe cover)  Patient monitoring: cardiac monitor, continuous pulse ox, frequent blood pressure checks and IV access  Block type: PENG  Laterality: right  Injection technique: single-shot  Guidance: ultrasound guided  Local infiltration: lidocaine  Infiltration strength: 1 %  Local infiltration: lidocaine  Dose: 5 mL    Needle   Needle type: combined needle/nerve stimulator   Needle gauge: 22 G  Needle localization: anatomical landmarks and ultrasound guidance  Needle length: 5 cm  Assessment   Injection assessment: negative aspiration for heme, no paresthesia on injection and local visualized surrounding nerve on ultrasound  Paresthesia pain: immediately resolved  Slow fractionated injection: yes  Hemodynamics: stable    Additional Notes  Ultrasound guidance used to view needle for placement.    Ultrasound image stored,  printed and saved in patient chart.    Sterile probe cover used    Medications Administered  ropivacaine (NAROPIN) injection 0.5% - Perineural   25 mL - 7/27/2024 10:48:00 AM

## 2024-07-27 NOTE — H&P
Trauma Consult / H & P Note    Reason for Consult: Trauma  Consulting Provider: MD Mehreen      BASIC INJURY INFORMATION:  Level of activation: CAT 2  Mode of transport: EMS  Mechanism of injury: Fall from Standing  Complicating features: NA  Protective measures: NA    HISTORY OF PRESENT INJURY:   Kristie Jason is a 68 y.o. female with a PMHx of osteoarthritis who presents after mechanical fall from standing. She tripped over her cat and fell onto her right hip.  dragged her into the other room and she sat on couch, wasn't able to walk. Called EMS. Complains of right hip and knee pain. Did not hit head or lose conciousness. No blood thinners.      PRIMARY SURVEY:  Airway: Intact  Breathing: Normal   Breath Sounds: Breath Sounds Equal Bilaterally  Circulation:    Pulses: Normal   Skin: Normal skin color, texture and turgor and No rashes or lesions  Disability:   Pupils: PERRL   GCS:    Best Eyes: 4    Best Verbal: 5    Best Motor: 6    Total: 15    Vitals:   Vitals:    07/27/24 0000 07/27/24 0019 07/27/24 0030 07/27/24 0148   BP: 134/61 124/73 128/74    Pulse:  84 86    Resp:  18 20 18   Temp:       TempSrc:       SpO2:  100% 95%    Weight:       Height:             SECONDARY SURVEY:  Neurologic: Alert and Oriented, Appropriate, Moves all Extremities, Strength Symmetrical, and No Sensory Deficits   HEENT:   Head: No lacerations, bony step-offs, or abrasions and Midface stable to palpation   Eyes: PERRL, Corneas/Conjunctiva without lesions and EOM intact   Ears: Not Examined   Nose: Septum Midline, No crepitus with motion; and No bloody discharge;   Throat: Oral cavity without trauma   Neck: No midline tenderness and No lacerations/wounds  Pulmonary: External exam: no crepitus or pain with palpation, no contusions or abrasions; and Lung exam: breath sounds clear, no wheezes, no rales  Cardiovascular:    Pulses: Bilateral radial, femoral, DP and PT pulses are normal;  Abdomen: Appearance: Non-distended, No scars,

## 2024-07-27 NOTE — ANESTHESIA PRE PROCEDURE
Department of Anesthesiology  Preprocedure Note       Name:  Kristie Jason   Age:  68 y.o.  :  1955                                          MRN:  [id number]         Date:  2024      Surgeon: Surgeon(s):  Kaelyn Walter MD    Procedure: Procedure(s):  HIP HEMIARTHROPLASTY    Medications prior to admission:   Prior to Admission medications    Medication Sig Start Date End Date Taking? Authorizing Provider   gabapentin (NEURONTIN) 100 MG capsule Take 1 capsule by mouth. 3/9/24 6/7/24  ProviderHumberto MD   fluocinonide (LIDEX) 0.05 % external solution Apply topically 2 times daily. For 5 days 24   Shukri Payne, APRN - CNP   trimethobenzamide (TIGAN) 100 MG/ML injection Inject 2 mLs into the muscle every 6 hours as needed for Nausea  Patient not taking: Reported on 2024   Kaylene Ferrera PA   polyethylene glycol (MIRALAX) 17 g PACK packet Take 17 g by mouth daily    ProviderHumberto MD   atorvastatin (LIPITOR) 20 MG tablet Take 1 tablet by mouth daily    Humberto Enrique MD   lisinopril (PRINIVIL;ZESTRIL) 5 MG tablet Take 1 tablet by mouth daily    ProviderHumberto MD   venlafaxine (EFFEXOR XR) 150 MG extended release capsule 2 capsules daily    ProviderHumberto MD   buPROPion (WELLBUTRIN XL) 300 MG extended release tablet 1 tablet every morning    ProviderHumberto MD   amitriptyline (ELAVIL) 25 MG tablet 1 tablet nightly    ProviderHumberto MD   Multiple Vitamin (MULTIVITAMINS PO)     ProviderHumberto MD   Calcium Carbonate-Vit D-Min (CALCIUM 1200 PO)     ProviderHumberto MD       Current medications:    Current Facility-Administered Medications   Medication Dose Route Frequency Provider Last Rate Last Admin   • sodium chloride flush 0.9 % injection 5-40 mL  5-40 mL IntraVENous PRN Percy Verde DO       • amitriptyline (ELAVIL) tablet 25 mg  25 mg Oral Nightly Bibi Alonzo MD       • atorvastatin (LIPITOR) tablet 20 mg

## 2024-07-27 NOTE — ED PROVIDER NOTES
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    IADL Comments: does the grocery shopping    Additional Comments: cares for dog         Social Determinants of Health     Financial Resource Strain: Low Risk  (4/5/2024)    Overall Financial Resource Strain (CARDIA)     Difficulty of Paying Living Expenses: Not hard at all   Food Insecurity: No Food Insecurity (4/5/2024)    Hunger Vital Sign     Worried About Running Out of Food in the Last Year: Never true     Ran Out of Food in the Last Year: Never true   Transportation Needs: Unknown (4/5/2024)    PRAPARE - Transportation     Lack of Transportation (Medical): Not on file     Lack of Transportation (Non-Medical): No   Physical Activity: Not on file   Stress: Not on file   Social Connections: Not on file   Intimate Partner Violence: Not on file   Housing Stability: Unknown (4/5/2024)    Housing Stability Vital Sign     Unable to Pay for Housing in the Last Year: Not on file     Number of Places Lived in the Last Year: Not on file     Unstable Housing in the Last Year: No       I counseled the patient against using tobacco products.    No family history on file.  No other pertinent FamHx on review with patient/guardian.    Allergies:  Remeron [mirtazapine]    Home Medications:  Prior to Admission medications    Medication Sig Start Date End Date Taking? Authorizing Provider   gabapentin (NEURONTIN) 100 MG capsule Take 1 capsule by mouth. 3/9/24 6/7/24

## 2024-07-28 PROBLEM — G89.18 POST-OP PAIN: Status: ACTIVE | Noted: 2024-07-28

## 2024-07-28 LAB
ANION GAP SERPL CALCULATED.3IONS-SCNC: 9 MEQ/L (ref 9–15)
BASOPHILS # BLD: 0 K/UL (ref 0–0.2)
BASOPHILS NFR BLD: 0.2 %
BUN SERPL-MCNC: 29 MG/DL (ref 8–23)
CALCIUM SERPL-MCNC: 8.9 MG/DL (ref 8.5–9.9)
CHLORIDE SERPL-SCNC: 98 MEQ/L (ref 95–107)
CO2 SERPL-SCNC: 30 MEQ/L (ref 20–31)
CREAT SERPL-MCNC: 0.83 MG/DL (ref 0.5–0.9)
EOSINOPHIL # BLD: 0.1 K/UL (ref 0–0.7)
EOSINOPHIL NFR BLD: 0.9 %
ERYTHROCYTE [DISTWIDTH] IN BLOOD BY AUTOMATED COUNT: 12.9 % (ref 11.5–14.5)
GLUCOSE SERPL-MCNC: 105 MG/DL (ref 70–99)
HCT VFR BLD AUTO: 33.4 % (ref 37–47)
HGB BLD-MCNC: 11.1 G/DL (ref 12–16)
LYMPHOCYTES # BLD: 0.7 K/UL (ref 1–4.8)
LYMPHOCYTES NFR BLD: 5 %
MAGNESIUM SERPL-MCNC: 2.3 MG/DL (ref 1.7–2.4)
MCH RBC QN AUTO: 31.2 PG (ref 27–31.3)
MCHC RBC AUTO-ENTMCNC: 33.2 % (ref 33–37)
MCV RBC AUTO: 93.8 FL (ref 79.4–94.8)
MONOCYTES # BLD: 1.3 K/UL (ref 0.2–0.8)
MONOCYTES NFR BLD: 10 %
NEUTROPHILS # BLD: 11 K/UL (ref 1.4–6.5)
NEUTS SEG NFR BLD: 83.4 %
PLATELET # BLD AUTO: 238 K/UL (ref 130–400)
POTASSIUM SERPL-SCNC: 4 MEQ/L (ref 3.4–4.9)
RBC # BLD AUTO: 3.56 M/UL (ref 4.2–5.4)
SODIUM SERPL-SCNC: 137 MEQ/L (ref 135–144)
WBC # BLD AUTO: 13.2 K/UL (ref 4.8–10.8)

## 2024-07-28 PROCEDURE — 80048 BASIC METABOLIC PNL TOTAL CA: CPT

## 2024-07-28 PROCEDURE — 97166 OT EVAL MOD COMPLEX 45 MIN: CPT

## 2024-07-28 PROCEDURE — 97163 PT EVAL HIGH COMPLEX 45 MIN: CPT

## 2024-07-28 PROCEDURE — 36415 COLL VENOUS BLD VENIPUNCTURE: CPT

## 2024-07-28 PROCEDURE — 97110 THERAPEUTIC EXERCISES: CPT

## 2024-07-28 PROCEDURE — 99232 SBSQ HOSP IP/OBS MODERATE 35: CPT | Performed by: PHYSICIAN ASSISTANT

## 2024-07-28 PROCEDURE — 6370000000 HC RX 637 (ALT 250 FOR IP): Performed by: STUDENT IN AN ORGANIZED HEALTH CARE EDUCATION/TRAINING PROGRAM

## 2024-07-28 PROCEDURE — 85025 COMPLETE CBC W/AUTO DIFF WBC: CPT

## 2024-07-28 PROCEDURE — 1210000000 HC MED SURG R&B

## 2024-07-28 PROCEDURE — 2580000003 HC RX 258: Performed by: STUDENT IN AN ORGANIZED HEALTH CARE EDUCATION/TRAINING PROGRAM

## 2024-07-28 PROCEDURE — 83735 ASSAY OF MAGNESIUM: CPT

## 2024-07-28 PROCEDURE — 2700000000 HC OXYGEN THERAPY PER DAY

## 2024-07-28 PROCEDURE — 6360000002 HC RX W HCPCS: Performed by: STUDENT IN AN ORGANIZED HEALTH CARE EDUCATION/TRAINING PROGRAM

## 2024-07-28 PROCEDURE — 6360000002 HC RX W HCPCS: Performed by: SURGERY

## 2024-07-28 RX ORDER — ENOXAPARIN SODIUM 100 MG/ML
30 INJECTION SUBCUTANEOUS 2 TIMES DAILY
Status: DISCONTINUED | OUTPATIENT
Start: 2024-07-28 | End: 2024-07-30 | Stop reason: HOSPADM

## 2024-07-28 RX ORDER — OXYCODONE HYDROCHLORIDE 5 MG/1
5 TABLET ORAL EVERY 4 HOURS PRN
Status: DISCONTINUED | OUTPATIENT
Start: 2024-07-28 | End: 2024-07-29

## 2024-07-28 RX ORDER — OXYCODONE HYDROCHLORIDE 5 MG/1
7.5 TABLET ORAL EVERY 4 HOURS PRN
Status: DISCONTINUED | OUTPATIENT
Start: 2024-07-28 | End: 2024-07-29

## 2024-07-28 RX ADMIN — ENOXAPARIN SODIUM 30 MG: 100 INJECTION SUBCUTANEOUS at 20:50

## 2024-07-28 RX ADMIN — SODIUM CHLORIDE, POTASSIUM CHLORIDE, SODIUM LACTATE AND CALCIUM CHLORIDE: 600; 310; 30; 20 INJECTION, SOLUTION INTRAVENOUS at 04:54

## 2024-07-28 RX ADMIN — OXYCODONE 5 MG: 5 TABLET ORAL at 01:44

## 2024-07-28 RX ADMIN — ONDANSETRON 4 MG: 2 INJECTION INTRAMUSCULAR; INTRAVENOUS at 17:46

## 2024-07-28 RX ADMIN — SODIUM CHLORIDE, PRESERVATIVE FREE 10 ML: 5 INJECTION INTRAVENOUS at 20:39

## 2024-07-28 RX ADMIN — ONDANSETRON 4 MG: 2 INJECTION INTRAMUSCULAR; INTRAVENOUS at 06:32

## 2024-07-28 ASSESSMENT — PAIN SCALES - GENERAL
PAINLEVEL_OUTOF10: 6
PAINLEVEL_OUTOF10: 4
PAINLEVEL_OUTOF10: 6

## 2024-07-28 ASSESSMENT — PAIN SCALES - WONG BAKER
WONGBAKER_NUMERICALRESPONSE: HURTS EVEN MORE
WONGBAKER_NUMERICALRESPONSE: HURTS EVEN MORE

## 2024-07-28 ASSESSMENT — PAIN DESCRIPTION - DESCRIPTORS
DESCRIPTORS: SHARP
DESCRIPTORS: SHARP

## 2024-07-28 ASSESSMENT — PAIN DESCRIPTION - LOCATION
LOCATION: LEG
LOCATION: LEG

## 2024-07-28 ASSESSMENT — PAIN DESCRIPTION - ORIENTATION: ORIENTATION: RIGHT

## 2024-07-28 NOTE — ACP (ADVANCE CARE PLANNING)
Advance Care Planning   Healthcare Decision Maker:    Primary Decision Maker: TOI ARDON - [facility] - [phone]

## 2024-07-28 NOTE — FLOWSHEET NOTE
Registered Nurse Shift Summary  24   Patient Name: Kristie Jason  Attending Provider: Bibi Alonzo MD      Admit Date: 2024  MRN: [id number]                           : 1955  Full Code    Code Status: Full Code      7:19 AM Assumed Care of Patient. Documentation initiated as per policy / SOP. Report Received from CHELE Joyce.    0900: Page to Dr. Michele. Per PT, they need an order regarding patient's weight bearing status prior to treatment. Electronically signed by Sarah Candelario RN on 2024 at 9:12 AM     0900: Patient refusing all meds at this time due to nausea.     1100: Patient refusing medications due to nausea.     1750: Patient encouraged to take deep breaths and cough. Education provided about post op pneumonia. Patient and spouse verbalized understanding.     1840: Patient continues to complain about nausea. No episodes of emesis since this morning. Patient indicates that she is passing gas. Bowel sounds are active. Patient encouraged to eat some crackers and sip sprite.These items were provided.  Electronically signed by Sarah Candelario RN on 2024 at 6:51 PM     Assessment Complete, please see flow sheets.   Labs, orders, plan of care and meds reviewed.       Labs:   Recent Labs     24  2300 24  0507 24  0500   WBC 9.3 11.7* 13.2*   HGB 11.5* 11.4* 11.1*   HCT 36.0* 33.9* 33.4*    219 238     Recent Labs     24  2300 24  0507 24  0500    141 137   K 3.9 4.1 4.0    103 98   CO2 25 28 30   BUN 33* 31* 29*   CREATININE 0.85 0.73 0.83   CALCIUM 8.7 8.5 8.9     Recent Labs     24  2300   AST 19   ALT 16   BILITOT <0.2   ALKPHOS 67     Recent Labs     24  2315   INR 1.1       Last set of vitals:  Enc Vitals  BP: 99/69 (24 0145)  Pulse: 83 (24 0145)  Respirations: 16 (24 0214)  Temp: 98.3 °F (36.8 °C) (24)  Temp Source: Oral (24)  SpO2: 92 % (24)  Weight - Scale: 64.8

## 2024-07-29 LAB
ANION GAP SERPL CALCULATED.3IONS-SCNC: 7 MEQ/L (ref 9–15)
BASOPHILS # BLD: 0 K/UL (ref 0–0.2)
BASOPHILS NFR BLD: 0.2 %
BUN SERPL-MCNC: 31 MG/DL (ref 8–23)
CALCIUM SERPL-MCNC: 8.4 MG/DL (ref 8.5–9.9)
CHLORIDE SERPL-SCNC: 101 MEQ/L (ref 95–107)
CO2 SERPL-SCNC: 29 MEQ/L (ref 20–31)
CREAT SERPL-MCNC: 0.58 MG/DL (ref 0.5–0.9)
EKG ATRIAL RATE: 86 BPM
EKG P AXIS: 68 DEGREES
EKG P-R INTERVAL: 160 MS
EKG Q-T INTERVAL: 368 MS
EKG QRS DURATION: 90 MS
EKG QTC CALCULATION (BAZETT): 440 MS
EKG R AXIS: -12 DEGREES
EKG T AXIS: 55 DEGREES
EKG VENTRICULAR RATE: 86 BPM
EOSINOPHIL # BLD: 0 K/UL (ref 0–0.7)
EOSINOPHIL NFR BLD: 0.1 %
ERYTHROCYTE [DISTWIDTH] IN BLOOD BY AUTOMATED COUNT: 13.2 % (ref 11.5–14.5)
GLUCOSE SERPL-MCNC: 120 MG/DL (ref 70–99)
HCT VFR BLD AUTO: 30.7 % (ref 37–47)
HGB BLD-MCNC: 10.2 G/DL (ref 12–16)
LYMPHOCYTES # BLD: 0.7 K/UL (ref 1–4.8)
LYMPHOCYTES NFR BLD: 5.5 %
MAGNESIUM SERPL-MCNC: 2.2 MG/DL (ref 1.7–2.4)
MCH RBC QN AUTO: 30.6 PG (ref 27–31.3)
MCHC RBC AUTO-ENTMCNC: 33.2 % (ref 33–37)
MCV RBC AUTO: 92.2 FL (ref 79.4–94.8)
MONOCYTES # BLD: 1.5 K/UL (ref 0.2–0.8)
MONOCYTES NFR BLD: 11.4 %
NEUTROPHILS # BLD: 10.8 K/UL (ref 1.4–6.5)
NEUTS SEG NFR BLD: 82.5 %
PLATELET # BLD AUTO: 189 K/UL (ref 130–400)
POTASSIUM SERPL-SCNC: 3.8 MEQ/L (ref 3.4–4.9)
RBC # BLD AUTO: 3.33 M/UL (ref 4.2–5.4)
SODIUM SERPL-SCNC: 137 MEQ/L (ref 135–144)
TSH REFLEX: 1.32 UIU/ML (ref 0.44–3.86)
WBC # BLD AUTO: 13 K/UL (ref 4.8–10.8)

## 2024-07-29 PROCEDURE — 97116 GAIT TRAINING THERAPY: CPT

## 2024-07-29 PROCEDURE — APPSS45 APP SPLIT SHARED TIME 31-45 MINUTES: Performed by: NURSE PRACTITIONER

## 2024-07-29 PROCEDURE — 1210000000 HC MED SURG R&B

## 2024-07-29 PROCEDURE — 93010 ELECTROCARDIOGRAM REPORT: CPT | Performed by: INTERNAL MEDICINE

## 2024-07-29 PROCEDURE — 6370000000 HC RX 637 (ALT 250 FOR IP): Performed by: STUDENT IN AN ORGANIZED HEALTH CARE EDUCATION/TRAINING PROGRAM

## 2024-07-29 PROCEDURE — 2700000000 HC OXYGEN THERAPY PER DAY

## 2024-07-29 PROCEDURE — 82746 ASSAY OF FOLIC ACID SERUM: CPT

## 2024-07-29 PROCEDURE — 6370000000 HC RX 637 (ALT 250 FOR IP): Performed by: PHYSICIAN ASSISTANT

## 2024-07-29 PROCEDURE — 84443 ASSAY THYROID STIM HORMONE: CPT

## 2024-07-29 PROCEDURE — 36415 COLL VENOUS BLD VENIPUNCTURE: CPT

## 2024-07-29 PROCEDURE — 97535 SELF CARE MNGMENT TRAINING: CPT

## 2024-07-29 PROCEDURE — 2580000003 HC RX 258: Performed by: STUDENT IN AN ORGANIZED HEALTH CARE EDUCATION/TRAINING PROGRAM

## 2024-07-29 PROCEDURE — 6360000002 HC RX W HCPCS: Performed by: SURGERY

## 2024-07-29 PROCEDURE — 94150 VITAL CAPACITY TEST: CPT

## 2024-07-29 PROCEDURE — 2580000003 HC RX 258: Performed by: PHYSICIAN ASSISTANT

## 2024-07-29 PROCEDURE — 85025 COMPLETE CBC W/AUTO DIFF WBC: CPT

## 2024-07-29 PROCEDURE — 83735 ASSAY OF MAGNESIUM: CPT

## 2024-07-29 PROCEDURE — 99232 SBSQ HOSP IP/OBS MODERATE 35: CPT | Performed by: PHYSICIAN ASSISTANT

## 2024-07-29 PROCEDURE — 82607 VITAMIN B-12: CPT

## 2024-07-29 PROCEDURE — 80048 BASIC METABOLIC PNL TOTAL CA: CPT

## 2024-07-29 PROCEDURE — 99223 1ST HOSP IP/OBS HIGH 75: CPT | Performed by: PSYCHIATRY & NEUROLOGY

## 2024-07-29 RX ORDER — TRAMADOL HYDROCHLORIDE 50 MG/1
50 TABLET ORAL EVERY 6 HOURS PRN
Status: DISCONTINUED | OUTPATIENT
Start: 2024-07-29 | End: 2024-07-30 | Stop reason: HOSPADM

## 2024-07-29 RX ORDER — TRAMADOL HYDROCHLORIDE 50 MG/1
100 TABLET ORAL EVERY 6 HOURS PRN
Status: DISCONTINUED | OUTPATIENT
Start: 2024-07-29 | End: 2024-07-30 | Stop reason: HOSPADM

## 2024-07-29 RX ORDER — SODIUM CHLORIDE, SODIUM LACTATE, POTASSIUM CHLORIDE, AND CALCIUM CHLORIDE .6; .31; .03; .02 G/100ML; G/100ML; G/100ML; G/100ML
500 INJECTION, SOLUTION INTRAVENOUS ONCE
Status: COMPLETED | OUTPATIENT
Start: 2024-07-29 | End: 2024-07-29

## 2024-07-29 RX ADMIN — BUPROPION HYDROCHLORIDE 300 MG: 300 TABLET, EXTENDED RELEASE ORAL at 15:41

## 2024-07-29 RX ADMIN — OXYCODONE 5 MG: 5 TABLET ORAL at 08:38

## 2024-07-29 RX ADMIN — METHOCARBAMOL 500 MG: 500 TABLET ORAL at 21:38

## 2024-07-29 RX ADMIN — ENOXAPARIN SODIUM 30 MG: 100 INJECTION SUBCUTANEOUS at 21:38

## 2024-07-29 RX ADMIN — SENNOSIDES AND DOCUSATE SODIUM 1 TABLET: 50; 8.6 TABLET ORAL at 08:38

## 2024-07-29 RX ADMIN — ENOXAPARIN SODIUM 30 MG: 100 INJECTION SUBCUTANEOUS at 08:37

## 2024-07-29 RX ADMIN — ATORVASTATIN CALCIUM 20 MG: 20 TABLET, FILM COATED ORAL at 21:41

## 2024-07-29 RX ADMIN — VENLAFAXINE HYDROCHLORIDE 300 MG: 150 CAPSULE, EXTENDED RELEASE ORAL at 08:37

## 2024-07-29 RX ADMIN — ACETAMINOPHEN 650 MG: 325 TABLET ORAL at 15:41

## 2024-07-29 RX ADMIN — AMITRIPTYLINE HYDROCHLORIDE 25 MG: 25 TABLET, FILM COATED ORAL at 21:38

## 2024-07-29 RX ADMIN — METHOCARBAMOL 500 MG: 500 TABLET ORAL at 08:38

## 2024-07-29 RX ADMIN — ACETAMINOPHEN 650 MG: 325 TABLET ORAL at 08:37

## 2024-07-29 RX ADMIN — SODIUM CHLORIDE, POTASSIUM CHLORIDE, SODIUM LACTATE AND CALCIUM CHLORIDE 500 ML: 600; 310; 30; 20 INJECTION, SOLUTION INTRAVENOUS at 09:27

## 2024-07-29 RX ADMIN — SENNOSIDES AND DOCUSATE SODIUM 1 TABLET: 50; 8.6 TABLET ORAL at 21:39

## 2024-07-29 RX ADMIN — SODIUM CHLORIDE, PRESERVATIVE FREE 10 ML: 5 INJECTION INTRAVENOUS at 21:39

## 2024-07-29 RX ADMIN — ACETAMINOPHEN 650 MG: 325 TABLET ORAL at 21:38

## 2024-07-29 RX ADMIN — POLYETHYLENE GLYCOL 3350 17 G: 17 POWDER, FOR SOLUTION ORAL at 08:37

## 2024-07-29 RX ADMIN — SODIUM CHLORIDE, PRESERVATIVE FREE 10 ML: 5 INJECTION INTRAVENOUS at 08:38

## 2024-07-29 RX ADMIN — METHOCARBAMOL 500 MG: 500 TABLET ORAL at 15:41

## 2024-07-29 ASSESSMENT — ENCOUNTER SYMPTOMS
TROUBLE SWALLOWING: 0
CHEST TIGHTNESS: 0
NAUSEA: 0
COLOR CHANGE: 0
WHEEZING: 0
VOMITING: 0
SHORTNESS OF BREATH: 0
COUGH: 0

## 2024-07-29 ASSESSMENT — PAIN DESCRIPTION - ORIENTATION
ORIENTATION: RIGHT

## 2024-07-29 ASSESSMENT — PAIN DESCRIPTION - LOCATION
LOCATION: LEG
LOCATION: LEG
LOCATION: HIP

## 2024-07-29 ASSESSMENT — PAIN DESCRIPTION - DESCRIPTORS
DESCRIPTORS: ACHING
DESCRIPTORS: SHARP;BURNING
DESCRIPTORS: BURNING

## 2024-07-29 ASSESSMENT — PAIN SCALES - GENERAL
PAINLEVEL_OUTOF10: 5
PAINLEVEL_OUTOF10: 6
PAINLEVEL_OUTOF10: 5
PAINLEVEL_OUTOF10: 5

## 2024-07-29 NOTE — CONSULTS
ischemia, prolonged ventilation and the possibility of mortality.     We discussed the importance of early mobilization and role of surgery for both functional improvement as well as pain management.  We discussed that despite the risk of medical complications the risk is even higher with non-surgical management and the patient is limited to bed rest (risk of DVT, pressure sores, pneumonia).            Electronically signed by Kaelyn Hinton MD on 7/27/2024 at 9:11 AM           
dose to as  low as reasonably achievable.    COMPARISON:  CT head without contrast, 04/11/2023 (11:50 a.m.).    HISTORY:  ORDERING SYSTEM PROVIDED HISTORY: repeat CT for monitoring of  intraparenchymal contusion  TECHNOLOGIST PROVIDED HISTORY:  Reason for exam:->repeat CT for monitoring of intraparenchymal contusion  Has a \"code stroke\" or \"stroke alert\" been called?->No  What reading provider will be dictating this exam?->CRC    FINDINGS:  BRAIN/VENTRICLES: Compared CT of the chest from earlier today, there is a 1.8  cm parenchymal hematoma in the right temporal lobe.  Stable trace subdural  hemorrhages along bilateral subdural hemorrhages, as well as along the right  tentorium cerebelli.  Trace subarachnoid blood also seen frontal lobes  bilaterally.    No hydrocephalus or intraventricular blood.    ORBITS: The visualized portion of the orbits demonstrate no acute abnormality.    SINUSES: The visualized paranasal sinuses and mastoid air cells demonstrate  no acute abnormality.    SOFT TISSUES/SKULL:  The calvarium is intact without fracture or focal  lesion.  Skin staples seen along the right parietal scalp.    Impression  1. No significant interval change as of this CT of the head from  approximately 7 hours earlier today (11:50 a.m.).  2. Stable 1.8 cm parenchymal hematoma in the right temporal lobe.  3. Stable small volume subdural hemorrhages bilaterally.  4. Stable trace subarachnoid hemorrhages.    RECOMMENDATIONS:  Unavailable  No results found for this or any previous visit.  No results found for this or any previous visit.      Carotid duplex: No results found for this or any previous visit.  No results found for this or any previous visit.  No results found for this or any previous visit.      Echo No results found for this or any previous visit.            Assessment/Plan:  Patient is a 68-year-old female with past medical history of hypertension, headache, depression, anemia right subdural hematoma and

## 2024-07-29 NOTE — FLOWSHEET NOTE
Pt awake in the room is very confused durning the night she pulled off dressing when asked why she said it was sticky. Pt Does not know why she is here but states her hip right side hurts.Electronically signed by Jo Ann Molina LPN on 7/29/2024 at 7:43 AM

## 2024-07-29 NOTE — DISCHARGE INSTR - COC
Continuity of Care Form    Patient Name: Kristie Ardon   :  1955  MRN:  [id number]    Admit date:  2024  Discharge date:  2024***    Code Status Order: Full Code   Advance Directives:   Advance Care Flowsheet Documentation       Date/Time Healthcare Directive Type of Healthcare Directive Copy in Chart Healthcare Agent Appointed Healthcare Agent's Name Healthcare Agent's Phone Number    [phone] Yes, patient has an advance directive for healthcare treatment Durable power of  for health care -- Spouse -- --            Admitting Physician:  Bibi Alonzo MD  PCP: Flor Calderón MD    Discharging Nurse: nadege  Discharging Hospital Unit/Room#: W280/W280-01  Discharging Unit Phone Number: [phone]    Emergency Contact:   Extended Emergency Contact Information  Primary Emergency Contact: TOI ARDON  Address: [id number] [place] [address]  Home Phone: [phone]  Relation: Spouse  Secondary Emergency Contact: KEIKOSHABANA  Mobile Phone: [phone]  Relation: Other  Preferred language: English   needed? No    Past Surgical History:  Past Surgical History:   Procedure Laterality Date    HIP SURGERY Right 2024    RIGHT HIP HEMIARTHROPLASTY performed by Kaelyn Walter MD at [facility] OR    HYSTERECTOMY (CERVIX STATUS UNKNOWN)      RECTAL SURGERY         Immunization History:   Immunization History   Administered Date(s) Administered    COVID-19, MODERNA BLUE border, Primary or Immunocompromised, (age 12y+), IM, 100 mcg/0.5mL 2021, 10/25/2021, 2022    COVID-19, MODERNA, ( formula), (age 12y+), IM, 50mcg/0.5mL 10/21/2023    COVID-19, PFIZER Bivalent, DO NOT Dilute, (age 12y+), IM, 30 mcg/0.3 mL 2023    COVID-19, PFIZER, ( formula), (age 12y+), IM, 30mcg/0.3mL 2024    TDaP, ADACEL (age 10y-64y), BOOSTRIX (age 10y+), IM, 0.5mL 2023       Active Problems:  Patient Active Problem List

## 2024-07-29 NOTE — DISCHARGE INSTRUCTIONS
Hip Replacement  Discharge Instructions    To prevent Clot formation, you have been placed on the following medication:  ASA for 30 days started on  Surgical Site Care:  Dressing change every days and PRN (as needed) with 4 x 4 and porous tape. No betadine. If glue is present, leave open to air  If Aquacel Ag (rubber) dressing is present, do not remove dressing for 7 days, unless heavily saturated. If heavily saturated, remove dressing and start daily dressing changes as described above   if Staples  will be removed on post-operative day 14 and steri-strips applied  Showering is permitted starting POD1 if waterproof aquacell dressing is present or when incision is covered with waterproof dressing, such as 4 x 4 and tegaderm  Physical Therapy:  Weight Bearing Status:  WBAT   Hip Precautions  Posterior hip precaution   Pain Medications  You were given pain meds  Wean off pain medications as you deem appropriate as long as pain is under control  Cold packs/Ice packs/Machine  May be used 3 times daily for 15-30 minutes as necessary  Be sure to have a barrier (cloth, clothing, towel) between the site and the ice pack to prevent frostbite  Contact [facility] office if  Increased redness, swelling, drainage of any kind, and/or pain to surgery site.  As well as new onset fevers and or chills.  These could signify an infection.  Calf or thigh tenderness to touch as well as increased swelling or redness.  This could signify a clot formation.  Numbness or tingling to an area around the incision site or below the incision site (toes).  Any rash appears, increased  or new onset nausea/vomiting occur.  This may indicate a reaction to a medication.   Phone # [phone].  Follow up with Surgeon, Dr EZRA Michele  I acknowledge that I have received evans hose and understand the instructions on how and when to wear them (on during the day off at night)   Discharging RN who has gone over instructions and acknowledges evans

## 2024-07-30 ENCOUNTER — HOSPITAL ENCOUNTER (INPATIENT)
Age: 69
LOS: 15 days | Discharge: HOME HEALTH | End: 2024-08-14
Attending: INTERNAL MEDICINE | Admitting: INTERNAL MEDICINE
Payer: MEDICARE

## 2024-07-30 VITALS
HEART RATE: 94 BPM | BODY MASS INDEX: 22.57 KG/M2 | RESPIRATION RATE: 18 BRPM | OXYGEN SATURATION: 97 % | DIASTOLIC BLOOD PRESSURE: 90 MMHG | HEIGHT: 66 IN | WEIGHT: 140.43 LBS | SYSTOLIC BLOOD PRESSURE: 103 MMHG | TEMPERATURE: 97.9 F

## 2024-07-30 DIAGNOSIS — I60.9 SAH (SUBARACHNOID HEMORRHAGE) (HCC): ICD-10-CM

## 2024-07-30 DIAGNOSIS — G89.18 POST-OP PAIN: ICD-10-CM

## 2024-07-30 DIAGNOSIS — R42 DIZZINESS: ICD-10-CM

## 2024-07-30 DIAGNOSIS — H81.4 CENTRAL VESTIBULAR VERTIGO: ICD-10-CM

## 2024-07-30 DIAGNOSIS — R94.31 QT PROLONGATION: ICD-10-CM

## 2024-07-30 DIAGNOSIS — G89.11 ACUTE PAIN DUE TO TRAUMA: ICD-10-CM

## 2024-07-30 DIAGNOSIS — D62 ACUTE BLOOD LOSS ANEMIA: ICD-10-CM

## 2024-07-30 DIAGNOSIS — S06.33AA: ICD-10-CM

## 2024-07-30 DIAGNOSIS — S06.5XAA SUBDURAL HEMATOMA (HCC): ICD-10-CM

## 2024-07-30 DIAGNOSIS — S72.061K: Primary | ICD-10-CM

## 2024-07-30 DIAGNOSIS — R27.0 ATAXIA: ICD-10-CM
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
## 2024-07-30 PROBLEM — S72.001A CLOSED DISPLACED FRACTURE OF RIGHT FEMORAL NECK (HCC): Status: RESOLVED | Noted: 2024-07-27 | Resolved: 2024-07-30

## 2024-07-30 LAB
FOLATE: 17.4 NG/ML (ref 4.8–24.2)
VITAMIN B-12: >2000 PG/ML (ref 232–1245)

## 2024-07-30 PROCEDURE — 99232 SBSQ HOSP IP/OBS MODERATE 35: CPT | Performed by: PSYCHIATRY & NEUROLOGY

## 2024-07-30 PROCEDURE — 6360000002 HC RX W HCPCS: Performed by: PHYSICIAN ASSISTANT

## 2024-07-30 PROCEDURE — 6370000000 HC RX 637 (ALT 250 FOR IP): Performed by: STUDENT IN AN ORGANIZED HEALTH CARE EDUCATION/TRAINING PROGRAM

## 2024-07-30 PROCEDURE — 99239 HOSP IP/OBS DSCHRG MGMT >30: CPT | Performed by: PHYSICIAN ASSISTANT

## 2024-07-30 PROCEDURE — 6370000000 HC RX 637 (ALT 250 FOR IP): Performed by: PHYSICIAN ASSISTANT

## 2024-07-30 PROCEDURE — 97535 SELF CARE MNGMENT TRAINING: CPT

## 2024-07-30 PROCEDURE — 1200000002 HC SEMI PRIVATE SWING BED

## 2024-07-30 PROCEDURE — APPSS15 APP SPLIT SHARED TIME 0-15 MINUTES: Performed by: NURSE PRACTITIONER

## 2024-07-30 RX ORDER — ONDANSETRON 2 MG/ML
4 INJECTION INTRAMUSCULAR; INTRAVENOUS EVERY 6 HOURS PRN
Status: DISCONTINUED | OUTPATIENT
Start: 2024-07-30 | End: 2024-08-14 | Stop reason: HOSPADM

## 2024-07-30 RX ORDER — ACETAMINOPHEN 325 MG/1
650 TABLET ORAL EVERY 6 HOURS
Status: DISCONTINUED | OUTPATIENT
Start: 2024-07-30 | End: 2024-07-31

## 2024-07-30 RX ORDER — ONDANSETRON 2 MG/ML
4 INJECTION INTRAMUSCULAR; INTRAVENOUS EVERY 6 HOURS PRN
Status: CANCELLED | OUTPATIENT
Start: 2024-07-30

## 2024-07-30 RX ORDER — ATORVASTATIN CALCIUM 10 MG/1
20 TABLET, FILM COATED ORAL DAILY
Status: DISCONTINUED | OUTPATIENT
Start: 2024-07-31 | End: 2024-08-14 | Stop reason: HOSPADM

## 2024-07-30 RX ORDER — METHOCARBAMOL 500 MG/1
500 TABLET, FILM COATED ORAL EVERY 6 HOURS
Status: CANCELLED | OUTPATIENT
Start: 2024-07-30

## 2024-07-30 RX ORDER — BUPROPION HYDROCHLORIDE 150 MG/1
300 TABLET ORAL EVERY MORNING
Status: CANCELLED | OUTPATIENT
Start: 2024-07-31

## 2024-07-30 RX ORDER — ACETAMINOPHEN 325 MG/1
650 TABLET ORAL EVERY 6 HOURS
Status: CANCELLED | OUTPATIENT
Start: 2024-07-30

## 2024-07-30 RX ORDER — VENLAFAXINE HYDROCHLORIDE 150 MG/1
300 CAPSULE, EXTENDED RELEASE ORAL DAILY
Status: DISCONTINUED | OUTPATIENT
Start: 2024-07-31 | End: 2024-07-31

## 2024-07-30 RX ORDER — BISACODYL 5 MG/1
5 TABLET, DELAYED RELEASE ORAL DAILY PRN
Status: CANCELLED | OUTPATIENT
Start: 2024-07-30

## 2024-07-30 RX ORDER — VENLAFAXINE HYDROCHLORIDE 150 MG/1
300 CAPSULE, EXTENDED RELEASE ORAL DAILY
Status: CANCELLED | OUTPATIENT
Start: 2024-07-31

## 2024-07-30 RX ORDER — POLYETHYLENE GLYCOL 3350 17 G/17G
17 POWDER, FOR SOLUTION ORAL DAILY
Status: CANCELLED | OUTPATIENT
Start: 2024-07-31

## 2024-07-30 RX ORDER — AMITRIPTYLINE HYDROCHLORIDE 25 MG/1
25 TABLET, FILM COATED ORAL NIGHTLY
Status: DISCONTINUED | OUTPATIENT
Start: 2024-07-30 | End: 2024-08-14 | Stop reason: HOSPADM

## 2024-07-30 RX ORDER — ENOXAPARIN SODIUM 100 MG/ML
30 INJECTION SUBCUTANEOUS 2 TIMES DAILY
Status: CANCELLED | OUTPATIENT
Start: 2024-07-30 | End: 2024-09-10

## 2024-07-30 RX ORDER — TRAMADOL HYDROCHLORIDE 50 MG/1
100 TABLET ORAL EVERY 6 HOURS PRN
Status: CANCELLED | OUTPATIENT
Start: 2024-07-30

## 2024-07-30 RX ORDER — POLYETHYLENE GLYCOL 3350 17 G/17G
17 POWDER, FOR SOLUTION ORAL DAILY
Status: DISCONTINUED | OUTPATIENT
Start: 2024-07-31 | End: 2024-08-14 | Stop reason: HOSPADM

## 2024-07-30 RX ORDER — ATORVASTATIN CALCIUM 20 MG/1
20 TABLET, FILM COATED ORAL DAILY
Status: CANCELLED | OUTPATIENT
Start: 2024-07-31

## 2024-07-30 RX ORDER — METHOCARBAMOL 500 MG/1
500 TABLET, FILM COATED ORAL EVERY 6 HOURS
Status: DISCONTINUED | OUTPATIENT
Start: 2024-07-30 | End: 2024-07-31

## 2024-07-30 RX ORDER — ONDANSETRON 4 MG/1
4 TABLET, ORALLY DISINTEGRATING ORAL EVERY 8 HOURS PRN
Status: CANCELLED | OUTPATIENT
Start: 2024-07-30

## 2024-07-30 RX ORDER — BISACODYL 5 MG/1
5 TABLET, DELAYED RELEASE ORAL DAILY PRN
Status: DISCONTINUED | OUTPATIENT
Start: 2024-07-30 | End: 2024-08-14 | Stop reason: HOSPADM

## 2024-07-30 RX ORDER — AMITRIPTYLINE HYDROCHLORIDE 25 MG/1
25 TABLET, FILM COATED ORAL NIGHTLY
Status: CANCELLED | OUTPATIENT
Start: 2024-07-30

## 2024-07-30 RX ORDER — SENNA AND DOCUSATE SODIUM 50; 8.6 MG/1; MG/1
1 TABLET, FILM COATED ORAL 2 TIMES DAILY
Status: DISCONTINUED | OUTPATIENT
Start: 2024-07-30 | End: 2024-08-14 | Stop reason: HOSPADM

## 2024-07-30 RX ORDER — TRAMADOL HYDROCHLORIDE 50 MG/1
100 TABLET ORAL EVERY 6 HOURS PRN
Status: DISCONTINUED | OUTPATIENT
Start: 2024-07-30 | End: 2024-08-14 | Stop reason: HOSPADM

## 2024-07-30 RX ORDER — TRAMADOL HYDROCHLORIDE 50 MG/1
50 TABLET ORAL EVERY 6 HOURS PRN
Status: CANCELLED | OUTPATIENT
Start: 2024-07-30

## 2024-07-30 RX ORDER — ENOXAPARIN SODIUM 100 MG/ML
30 INJECTION SUBCUTANEOUS 2 TIMES DAILY
Status: DISCONTINUED | OUTPATIENT
Start: 2024-07-30 | End: 2024-08-14 | Stop reason: HOSPADM

## 2024-07-30 RX ORDER — TRAMADOL HYDROCHLORIDE 50 MG/1
50 TABLET ORAL EVERY 6 HOURS PRN
Status: DISCONTINUED | OUTPATIENT
Start: 2024-07-30 | End: 2024-08-14 | Stop reason: HOSPADM

## 2024-07-30 RX ORDER — BUPROPION HYDROCHLORIDE 150 MG/1
300 TABLET ORAL EVERY MORNING
Status: DISCONTINUED | OUTPATIENT
Start: 2024-07-31 | End: 2024-08-14 | Stop reason: HOSPADM

## 2024-07-30 RX ORDER — ONDANSETRON 4 MG/1
4 TABLET, ORALLY DISINTEGRATING ORAL EVERY 8 HOURS PRN
Status: DISCONTINUED | OUTPATIENT
Start: 2024-07-30 | End: 2024-08-14 | Stop reason: HOSPADM

## 2024-07-30 RX ORDER — SENNA AND DOCUSATE SODIUM 50; 8.6 MG/1; MG/1
1 TABLET, FILM COATED ORAL 2 TIMES DAILY
Status: CANCELLED | OUTPATIENT
Start: 2024-07-30

## 2024-07-30 RX ADMIN — ACETAMINOPHEN 650 MG: 325 TABLET ORAL at 20:59

## 2024-07-30 RX ADMIN — ENOXAPARIN SODIUM 30 MG: 100 INJECTION SUBCUTANEOUS at 20:58

## 2024-07-30 RX ADMIN — SENNOSIDES AND DOCUSATE SODIUM 1 TABLET: 50; 8.6 TABLET ORAL at 20:58

## 2024-07-30 RX ADMIN — AMITRIPTYLINE HYDROCHLORIDE 25 MG: 25 TABLET, FILM COATED ORAL at 20:59

## 2024-07-30 RX ADMIN — METHOCARBAMOL TABLETS 500 MG: 500 TABLET, COATED ORAL at 20:59

## 2024-07-30 ASSESSMENT — PAIN SCALES - GENERAL
PAINLEVEL_OUTOF10: 4
PAINLEVEL_OUTOF10: 5
PAINLEVEL_OUTOF10: 5

## 2024-07-30 ASSESSMENT — ENCOUNTER SYMPTOMS
WHEEZING: 0
VOMITING: 0
COLOR CHANGE: 0
TROUBLE SWALLOWING: 0
NAUSEA: 0
SHORTNESS OF BREATH: 0
CHEST TIGHTNESS: 0
COUGH: 0

## 2024-07-30 ASSESSMENT — PAIN - FUNCTIONAL ASSESSMENT: PAIN_FUNCTIONAL_ASSESSMENT: PREVENTS OR INTERFERES SOME ACTIVE ACTIVITIES AND ADLS

## 2024-07-30 ASSESSMENT — PAIN DESCRIPTION - ORIENTATION
ORIENTATION: RIGHT

## 2024-07-30 ASSESSMENT — PAIN DESCRIPTION - DESCRIPTORS
DESCRIPTORS: ACHING
DESCRIPTORS: ACHING

## 2024-07-30 ASSESSMENT — PAIN DESCRIPTION - LOCATION
LOCATION: HIP
LOCATION: LEG
LOCATION: HIP

## 2024-07-30 ASSESSMENT — PAIN SCALES - WONG BAKER: WONGBAKER_NUMERICALRESPONSE: HURTS A LITTLE BIT

## 2024-07-30 NOTE — PLAN OF CARE
Problem: Discharge Planning  Goal: Discharge to home or other facility with appropriate resources  7/28/2024 2207 by Phani Dobbs RN  Outcome: Progressing  7/28/2024 1021 by Sarah Candelario RN  Outcome: Progressing  Flowsheets (Taken 7/28/2024 0800)  Discharge to home or other facility with appropriate resources: Identify barriers to discharge with patient and caregiver     Problem: Pain  Goal: Verbalizes/displays adequate comfort level or baseline comfort level  7/28/2024 2207 by Phani Dobbs RN  Outcome: Progressing  7/28/2024 1021 by Sarah Candelario RN  Outcome: Progressing     Problem: Safety - Adult  Goal: Free from fall injury  7/28/2024 2207 by Phani Dobbs RN  Outcome: Progressing  7/28/2024 1021 by Sarah Candelario RN  Outcome: Progressing     Problem: ABCDS Injury Assessment  Goal: Absence of physical injury  7/28/2024 2207 by Phani Dobbs RN  Outcome: Progressing  7/28/2024 1021 by Sarah Candelario RN  Outcome: Progressing     Problem: Skin/Tissue Integrity  Goal: Absence of new skin breakdown  Description: 1.  Monitor for areas of redness and/or skin breakdown  2.  Assess vascular access sites hourly  3.  Every 4-6 hours minimum:  Change oxygen saturation probe site  4.  Every 4-6 hours:  If on nasal continuous positive airway pressure, respiratory therapy assess nares and determine need for appliance change or resting period.  7/28/2024 2207 by Phani Dobbs RN  Outcome: Progressing  7/28/2024 1021 by Sarah Candelario RN  Outcome: Progressing     Problem: Skin/Tissue Integrity - Adult  Goal: Skin integrity remains intact  Outcome: Progressing     Problem: Musculoskeletal - Adult  Goal: Return mobility to safest level of function  Outcome: Progressing     Problem: Coping  Goal: Pt/Family able to verbalize concerns and demonstrate effective coping strategies  Description: INTERVENTIONS:  1. Assist patient/family to identify coping skills, available support systems and cultural and spiritual values  2. 
  Problem: Discharge Planning  Goal: Discharge to home or other facility with appropriate resources  Outcome: Progressing     Problem: Pain  Goal: Verbalizes/displays adequate comfort level or baseline comfort level  Outcome: Progressing     Problem: Safety - Adult  Goal: Free from fall injury  Outcome: Progressing     Problem: ABCDS Injury Assessment  Goal: Absence of physical injury  Outcome: Progressing     Problem: Skin/Tissue Integrity  Goal: Absence of new skin breakdown  Description: 1.  Monitor for areas of redness and/or skin breakdown  2.  Assess vascular access sites hourly  3.  Every 4-6 hours minimum:  Change oxygen saturation probe site  4.  Every 4-6 hours:  If on nasal continuous positive airway pressure, respiratory therapy assess nares and determine need for appliance change or resting period.  Outcome: Progressing     
  Problem: Discharge Planning  Goal: Discharge to home or other facility with appropriate resources  Outcome: Progressing     Problem: Pain  Goal: Verbalizes/displays adequate comfort level or baseline comfort level  Outcome: Progressing     Problem: Safety - Adult  Goal: Free from fall injury  Outcome: Progressing     Problem: ABCDS Injury Assessment  Goal: Absence of physical injury  Outcome: Progressing     Problem: Skin/Tissue Integrity  Goal: Absence of new skin breakdown  Description: 1.  Monitor for areas of redness and/or skin breakdown  2.  Assess vascular access sites hourly  3.  Every 4-6 hours minimum:  Change oxygen saturation probe site  4.  Every 4-6 hours:  If on nasal continuous positive airway pressure, respiratory therapy assess nares and determine need for appliance change or resting period.  Outcome: Progressing     Problem: Skin/Tissue Integrity - Adult  Goal: Skin integrity remains intact  Outcome: Progressing  Flowsheets (Taken 7/29/2024 2040)  Skin Integrity Remains Intact: Monitor for areas of redness and/or skin breakdown     Problem: Musculoskeletal - Adult  Goal: Return mobility to safest level of function  Outcome: Progressing     Problem: Coping  Goal: Pt/Family able to verbalize concerns and demonstrate effective coping strategies  Description: INTERVENTIONS:  1. Assist patient/family to identify coping skills, available support systems and cultural and spiritual values  2. Provide emotional support, including active listening and acknowledgement of concerns of patient and caregivers  3. Reduce environmental stimuli, as able  4. Instruct patient/family in relaxation techniques, as appropriate  5. Assess for spiritual pain/suffering and initiate Spiritual Care, Psychosocial Clinical Specialist consults as needed  Outcome: Progressing     
  Problem: Discharge Planning  Goal: Discharge to home or other facility with appropriate resources  Outcome: Progressing  Flowsheets  Taken 7/27/2024 0333  Discharge to home or other facility with appropriate resources:   Identify barriers to discharge with patient and caregiver   Identify discharge learning needs (meds, wound care, etc)  Taken 7/27/2024 0300  Discharge to home or other facility with appropriate resources: Identify barriers to discharge with patient and caregiver     Problem: Pain  Goal: Verbalizes/displays adequate comfort level or baseline comfort level  Outcome: Progressing  Flowsheets (Taken 7/27/2024 0307)  Verbalizes/displays adequate comfort level or baseline comfort level:   Encourage patient to monitor pain and request assistance   Assess pain using appropriate pain scale   Administer analgesics based on type and severity of pain and evaluate response   Implement non-pharmacological measures as appropriate and evaluate response     Problem: Safety - Adult  Goal: Free from fall injury  Outcome: Progressing     Problem: ABCDS Injury Assessment  Goal: Absence of physical injury  Outcome: Progressing     Problem: Skin/Tissue Integrity  Goal: Absence of new skin breakdown  Description: 1.  Monitor for areas of redness and/or skin breakdown  2.  Assess vascular access sites hourly  3.  Every 4-6 hours minimum:  Change oxygen saturation probe site  4.  Every 4-6 hours:  If on nasal continuous positive airway pressure, respiratory therapy assess nares and determine need for appliance change or resting period.  Outcome: Progressing     
by Geovanna Issa, RN  Outcome: Progressing  7/27/2024 0402 by Richa Leiva, RN  Outcome: Progressing

## 2024-07-30 NOTE — PROGRESS NOTES
DAILY PROGRESS NOTE      POD: 1    Patient doing well  Vitals:    24 0214   BP:    Pulse:    Resp: 16   Temp:    SpO2:       Temp (24hrs), Av.7 °F (36.5 °C), Min:96.8 °F (36 °C), Max:98.4 °F (36.9 °C)       Pain Control Good  No chest pain or shortness of breath.  No calf pain    Exam:   Incision(s): clean  Dressing clean, dry, and intact  Operative extremity shows neuro vascular status intact. Flexion and extension intact on operative extremity  Calves soft and non-tender without evidence of DVT.  .      Labs reviewed:  CBC:   Recent Labs     24  2300 24  0507 24  0500   WBC 9.3 11.7* 13.2*   HGB 11.5* 11.4* 11.1*    219 238     BMP:    Recent Labs     24  2300 24  0507 24  0500    141 137   K 3.9 4.1 4.0    103 98   CO2 25 28 30   BUN 33* 31* 29*   CREATININE 0.85 0.73 0.83   GLUCOSE 111* 128* 105*       I&O  I/O last 3 completed shifts:  In: 220 [IV Piggyback:220]  Out: -       Assessment:  Good Post Operative Course.    Plan:    WBAT  PT/OT  Lovenox 40 daily for 28 days for dvt ppx  Follow up in 3 weeks for staple removal and xray     Kaelyn Hinton MD MD  2024 6:46 AM   
  [facility] Neurology Daily Progress Note  Name: Kristie Jason  Age: 68 y.o.  Gender: female  CodeStatus: Full Code  Allergies: Remeron [Mirtazapine]    Chief Complaint:Fall (Fell from standing. Patient states she tripped and fell over her cat. Right hip pain. -LOC, -thinners.)    Primary Care Provider: Flor Calderón MD  InpatientTreatment Team: Treatment Team: Attending Provider: Marianne Mcfarland MD; Patient Care Tech: Pastora Ellis; Utilization Reviewer: Clay Boswell RN; Consulting Physician: Malik Mcfarland MD; LPN: Jo Ann Molina LPN; Registered Nurse: Chaitanya Centeno RN  Admission Date: 7/26/2024      HPI   Pt seen and examined for neuro follow up.  Patient is alert and oriented x 2-3, no acute distress, cooperative.  Patient with short-term memory loss.  Bilateral upper extremity mild resting tremor noted left greater than right.  Rigidity with distraction maneuvers.  Some cogwheeling.  Decrement on finger tap testing.  Unable to personally assess gait.   reports patient has shuffling gait.  Patient reports increasing difficulty with writing.  Reports that the longer she writes the smaller the writing gets.  Patient seen and examined events as noted above.  Vitals:    07/30/24 0900   BP:    Pulse: 89   Resp:    Temp:    SpO2:         Review of Systems   Constitutional:  Negative for fatigue and fever.   HENT:  Negative for hearing loss and trouble swallowing.    Eyes:  Negative for visual disturbance.   Respiratory:  Negative for cough, chest tightness, shortness of breath and wheezing.    Cardiovascular:  Negative for chest pain, palpitations and leg swelling.   Gastrointestinal:  Negative for nausea and vomiting.   Musculoskeletal:  Positive for gait problem.   Skin:  Negative for color change and rash.   Neurological:  Positive for tremors, weakness and light-headedness. Negative for dizziness, seizures, syncope, facial asymmetry, speech difficulty, numbness and headaches. 
Found patient pulling off dressing and picking at her incision. Advised patient not to touch her incision.   Cleaned area and reinforced dressing. Staples remain clean and intact.  
Hip genny surgery    Progress Note    Subjective:     Post-Operative Day: 2 Status Post right Hip genny Arthroplasty- tx for hip frature  Systemic or Specific Complaints:No Complaints    Objective:     CURRENT VITALS:  /81   Pulse 92   Temp 98.1 °F (36.7 °C) (Oral)   Resp 18   Ht 1.676 m (5' 6\")   Wt 64.8 kg (142 lb 13.7 oz)   SpO2 95%   BMI 23.06 kg/m²     General: alert, appears stated age, and cooperative   Wound: Aquacel on    Motion: Painful range of Motion   DVT Exam: No evidence of DVT seen on physical exam.       NVI in lower extremity. Thigh swollen but soft. Moving foot and ankle.      Data Review  Recent Labs     07/27/24  0507 07/28/24  0500 07/29/24  0737   WBC 11.7* 13.2* 13.0*   RBC 3.65* 3.56* 3.33*   HGB 11.4* 11.1* 10.2*   HCT 33.9* 33.4* 30.7*   MCV 92.9 93.8 92.2   MCH 31.2 31.2 30.6   MCHC 33.6 33.2 33.2   RDW 12.9 12.9 13.2    238 189       Assessment:     Status Post right Hip genny Arthroplasty. Doing fairly well postoperatively.    Ortho orders and follow up in chart    Pt managed by trauma team  Ortho sign off with above      Plan:      1: Continues current post-op course-  :  2:  Continue Deep venous thrombosis prophylaxis  3:  Continue physical therapy  4:  Continue Pain Control        
[facility] OCCUPATIONAL THERAPY EVALUATION - ACUTE     NAME: Kristie Jason  : 1955 (68 y.o.)  MRN: [id number]  CODE STATUS: Full Code  Room: 80/W280-01    Date of Service: 2024    Patient Diagnosis(es): Closed fracture of neck of right femur, initial encounter ([facility]) [S72.001A]  Fall, initial encounter [W19.XXXA]  Closed displaced fracture of right femoral neck (HCC) [S72.001A]   Patient Active Problem List    Diagnosis Date Noted    Post-op pain 2024    Closed displaced fracture of right femoral neck (HCC) 2024    Hearing loss 2023    Scalp laceration 2023    Fall 2023    Acute blood loss anemia 2023    Central vestibular vertigo 2023    Ataxia 2023    Head injury 04/10/2023    QT prolongation 04/10/2023    Acute pain due to trauma 04/10/2023    Dizziness 04/10/2023    Nausea and vomiting 04/10/2023    Cerebral contusion and laceration (HCC) 2023    Laceration of right elbow 2023    Subdural hematoma (HCC) 2023    SAH (subarachnoid hemorrhage) ([facility]) 2023        Past Medical History:   Diagnosis Date    Anemia     Depression     Headache     Hypertension     Osteoarthritis      Past Surgical History:   Procedure Laterality Date    HYSTERECTOMY (CERVIX STATUS UNKNOWN)      RECTAL SURGERY          Restrictions  Restrictions/Precautions: Weight Bearing      Right Lower Extremity Weight Bearing: Weight Bearing As Tolerated       Safety Devices: Safety Devices  Type of Devices: All fall risk precautions in place;Bed alarm in place;Call light within reach;Left in bed     Patient's date of birth confirmed: Yes    General:  Patient assessed for rehabilitation services?: Yes    Subjective          Pain at start of treatment: Yes: 710    Pain at end of treatment: Yes: 10    Location: R hip  Description: pain  Nursing notified: No  Intervention: Cold applied    Prior Level of Function:  Social/Functional History  Lives With: Spouse  Type of 
New admission to room 280  
Physical Therapy Med Surg Daily Treatment Note  Facility/Department: [facility] [place] [place] 2W ORTHO TELE  Room: 80/W280-01       NAME: Kristie Jason  : 1955 (68 y.o.)  MRN: [id number]  CODE STATUS: Full Code    Date of Service: 2024    Patient Diagnosis(es): Closed fracture of neck of right femur, initial encounter ([facility]) [S72.001A]  Fall, initial encounter [W19.XXXA]  Closed displaced fracture of right femoral neck (HCC) [S72.001A]   Chief Complaint   Patient presents with    Fall     Fell from standing. Patient states she tripped and fell over her cat. Right hip pain. -LOC, -thinners.     Patient Active Problem List    Diagnosis Date Noted    Post-op pain 2024    Closed displaced fracture of right femoral neck (HCC) 2024    Hearing loss 2023    Scalp laceration 2023    Fall 2023    Acute blood loss anemia 2023    Central vestibular vertigo 2023    Ataxia 2023    Head injury 04/10/2023    QT prolongation 04/10/2023    Acute pain due to trauma 04/10/2023    Dizziness 04/10/2023    Nausea and vomiting 04/10/2023    Cerebral contusion and laceration (HCC) 2023    Laceration of right elbow 2023    Subdural hematoma (HCC) 2023    SAH (subarachnoid hemorrhage) (HCC) 2023        Past Medical History:   Diagnosis Date    Anemia     Depression     Headache     Hypertension     Osteoarthritis      Past Surgical History:   Procedure Laterality Date    HIP SURGERY Right 2024    RIGHT HIP HEMIARTHROPLASTY performed by Kaelyn Walter MD at [facility] [place] [place] OR    HYSTERECTOMY (CERVIX STATUS UNKNOWN)      RECTAL SURGERY         Chart Reviewed: Yes  Family / Caregiver Present: Yes  General Comment  Comments: pt fixated on mucuous and or \"pill stuck in my throat.\" pt able to breathe and conversate without noted issue. RN aware.    Restrictions:  Restrictions/Precautions: Weight Bearing  Lower Extremity Weight Bearing Restrictions  Right Lower Extremity Weight 
Physical Therapy Med Surg Daily Treatment Note  Facility/Department: [facility] [place] 2 ORTHO TELE  [facility]       NAME: Kristie Jason  : 1955 (68 y.o.)  MRN: [id number]  CODE STATUS: Full Code    Date of Service: 2024    Patient Diagnosis(es): Closed fracture of neck of right femur, initial encounter ([facility]) [S72.001A]  Fall, initial encounter [W19.XXXA]  Closed displaced fracture of right femoral neck (HCC) [S72.001A]   Chief Complaint   Patient presents with    Fall     Fell from standing. Patient states she tripped and fell over her cat. Right hip pain. -LOC, -thinners.     Patient Active Problem List    Diagnosis Date Noted    Post-op pain 2024    Closed displaced fracture of right femoral neck (HCC) 2024    Hearing loss 2023    Scalp laceration 2023    Fall 2023    Acute blood loss anemia 2023    Central vestibular vertigo 2023    Ataxia 2023    Head injury 04/10/2023    QT prolongation 04/10/2023    Acute pain due to trauma 04/10/2023    Dizziness 04/10/2023    Nausea and vomiting 04/10/2023    Cerebral contusion and laceration (HCC) 2023    Laceration of right elbow 2023    Subdural hematoma (HCC) 2023    SAH (subarachnoid hemorrhage) (HCC) 2023        Past Medical History:   Diagnosis Date    Anemia     Depression     Headache     Hypertension     Osteoarthritis      Past Surgical History:   Procedure Laterality Date    HYSTERECTOMY (CERVIX STATUS UNKNOWN)      RECTAL SURGERY         Chart Reviewed: Yes  Patient assessed for rehabilitation services?: Yes  Family / Caregiver Present: Yes ()    Restrictions:  Restrictions/Precautions: Weight Bearing  Lower Extremity Weight Bearing Restrictions  Right Lower Extremity Weight Bearing: Weight Bearing As Tolerated    SUBJECTIVE:   Subjective: \"I feel like I got hit by a truck\" Patient reports pain and nausea.    Pain  Pain: 8/10 Rt hip and nausea- declined 
Physical Therapy Med Surg Daily Treatment Note  Facility/Department: [facility] [place] 2W ORTHO TELE  [facility]       NAME: Kristie Jason  : 1955 (68 y.o.)  MRN: [id number]  CODE STATUS: Full Code    Date of Service: 2024    Patient Diagnosis(es): Closed fracture of neck of right femur, initial encounter ([facility]) [S72.001A]  Fall, initial encounter [W19.XXXA]  Closed displaced fracture of right femoral neck (HCC) [S72.001A]   Chief Complaint   Patient presents with    Fall     Fell from standing. Patient states she tripped and fell over her cat. Right hip pain. -LOC, -thinners.     Patient Active Problem List    Diagnosis Date Noted    Post-op pain 2024    Closed displaced fracture of right femoral neck (HCC) 2024    Hearing loss 2023    Scalp laceration 2023    Fall 2023    Acute blood loss anemia 2023    Central vestibular vertigo 2023    Ataxia 2023    Head injury 04/10/2023    QT prolongation 04/10/2023    Acute pain due to trauma 04/10/2023    Dizziness 04/10/2023    Nausea and vomiting 04/10/2023    Cerebral contusion and laceration (HCC) 2023    Laceration of right elbow 2023    Subdural hematoma (HCC) 2023    SAH (subarachnoid hemorrhage) (HCC) 2023        Past Medical History:   Diagnosis Date    Anemia     Depression     Headache     Hypertension     Osteoarthritis      Past Surgical History:   Procedure Laterality Date    HIP SURGERY Right 2024    RIGHT HIP HEMIARTHROPLASTY performed by Kaelyn Walter MD at [facility] [place] OR    HYSTERECTOMY (CERVIX STATUS UNKNOWN)      RECTAL SURGERY         Chart Reviewed: Yes  Family / Caregiver Present: Yes ()    Restrictions:  Restrictions/Precautions: Weight Bearing  Lower Extremity Weight Bearing Restrictions  Right Lower Extremity Weight Bearing: Weight Bearing As Tolerated    SUBJECTIVE:   Subjective: I want to get back in bed.    Pain  Pain: 6/10 pre and post session R hip pain; 
Physical Therapy Med Surg Initial Assessment  Facility/Department: [address] ORTHO TELE  [facility]/W280-       NAME: Kristie Jason  : 1955 (68 y.o.)  MRN: [id number]  CODE STATUS: Full Code    Date of Service: 2024    Patient Diagnosis(es): Closed fracture of neck of right femur, initial encounter ([facility]) [S72.001A]  Fall, initial encounter [W19.XXXA]  Closed displaced fracture of right femoral neck (HCC) [S72.001A]   Chief Complaint   Patient presents with    Fall     Fell from standing. Patient states she tripped and fell over her cat. Right hip pain. -LOC, -thinners.     Patient Active Problem List    Diagnosis Date Noted    Post-op pain 2024    Closed displaced fracture of right femoral neck (HCC) 2024    Hearing loss 2023    Scalp laceration 2023    Fall 2023    Acute blood loss anemia 2023    Central vestibular vertigo 2023    Ataxia 2023    Head injury 04/10/2023    QT prolongation 04/10/2023    Acute pain due to trauma 04/10/2023    Dizziness 04/10/2023    Nausea and vomiting 04/10/2023    Cerebral contusion and laceration (HCC) 2023    Laceration of right elbow 2023    Subdural hematoma (HCC) 2023    SAH (subarachnoid hemorrhage) (HCC) 2023        Past Medical History:   Diagnosis Date    Anemia     Depression     Headache     Hypertension     Osteoarthritis      Past Surgical History:   Procedure Laterality Date    HYSTERECTOMY (CERVIX STATUS UNKNOWN)      RECTAL SURGERY         Patient assessed for rehabilitation services?: Yes  Family / Caregiver Present: Yes ()    Restrictions:  Restrictions/Precautions: Weight Bearing  Lower Extremity Weight Bearing Restrictions  Right Lower Extremity Weight Bearing: Weight Bearing As Tolerated     SUBJECTIVE:   Pain   7/10 R hip- pt stated she was already given pain meds    Prior Level of Function:  Social/Functional History  Lives With: Spouse  Type of Home: House  Home Layout: 
Plan for right hip genny for FNF today   Maintain NPO  Preop labs complete     
TRAUMA DAILY PROGRESS NOTE      Patient Name: Kristie Jason  Admission Date 2024    Hospital Day: 1  Patient seen and examined on 2024    INTERVAL HISTORY/EVENTS     Background:  Kristie Jason is a 68 y.o. female with a PMHx of osteoarthritis and HTN who presented to [facility] ED on 24 s/p mechanical fall from standing while at home. (-) head strike, (-) LOC, (-) AC/AP.    Trauma workup found right femoral neck fracture.      Hospital Course:  2024: s/p mechanical fall from standing while at home. (-) head strike, (-) LOC, (-) AC/AP. Admitted t our trauma service. Ortho consulted.  2024: OR with Ortho (Dr. Michele) for right hip hemiarthroplasty.      24 Hour Events:  POD#1 s/p right hip hemiarthroplasty with Ortho  (EZRA Michele) on 2024. Reports nausea but pain well controlled. PT/OT eval and dispo recs pending. VSS on 2L NC, able to wean to room air.    Labs reviewed and significant for mild leukocytosis (13.2), stable H&H. No acute renal or electrolyte imbalances.      PHYSICAL EXAM     Vitals Average, Min and Max for last 24 hours:  Temp: Temp: 98.3 °F (36.8 °C); Temp  Av.6 °F (36.4 °C)  Min: 96.8 °F (36 °C)  Max: 98.4 °F (36.9 °C)  Respirations: Resp  Av.4  Min: 12  Max: 18  Pulse: Pulse  Av.5  Min: 80  Max: 96  Blood Pressure: Systolic (24hrs), Av , Min:99 , Max:131   ; Diastolic (24hrs), Av, Min:59, Max:72  SpO2: SpO2  Av %  Min: 92 %  Max: 96 %      24hr Intake/Output:   Intake/Output Summary (Last 24 hours) at 2024 0706  Last data filed at 2024 0149  Gross per 24 hour   Intake 220 ml   Output 600 ml   Net -380 ml       Vitals: BP 99/69   Pulse 83   Temp 98.3 °F (36.8 °C) (Oral)   Resp 16   Ht 1.676 m (5' 6\")   Wt 64.8 kg (142 lb 13.7 oz)   SpO2 92%   BMI 23.06 kg/m²     Physical Exam:  Constitutional: Lying supine in bed with eyes closed. Appears in no acute distress.  HEENT: Atraumatic normocephalic.  Cardiovascular: Regular rate 
TRAUMA DAILY PROGRESS NOTE      Patient Name: Kristie Jason  Admission Date 2024    Hospital Day: 2  Patient seen and examined on 2024    INTERVAL HISTORY/EVENTS     Background:  Kristie Jason is a 68 y.o. female with a PMHx of osteoarthritis and HTN who presented to [facility] ED on 24 s/p mechanical fall from standing while at home. (-) head strike, (-) LOC, (-) AC/AP.    Trauma workup found right femoral neck fracture.      Hospital Course:  2024: s/p mechanical fall from standing while at home. (-) head strike, (-) LOC, (-) AC/AP. Admitted t our trauma service. Ortho consulted.  2024: OR with Ortho (Dr. Michele) for right hip hemiarthroplasty.  2024: Medically cleared for discharge to SNF. PT/OT eval SNF.      24 Hour Events:  POD#2 s/p right hip hemiarthroplasty with Ortho (EZRA Michele) on 2024. Poor PO intake. Orthostatic when up in chair with nursing. PT/OT eval and dispo recs pending. VSS on room air.    Labs reviewed and significant for persistent mild leukocytosis, stable H&H. No acute renal or electrolyte imbalances.      PHYSICAL EXAM     Vitals Average, Min and Max for last 24 hours:  Temp: Temp: 98.4 °F (36.9 °C); Temp  Av.2 °F (36.8 °C)  Min: 97.9 °F (36.6 °C)  Max: 98.4 °F (36.9 °C)  Respirations: Resp  Av.3  Min: 16  Max: 18  Pulse: Pulse  Av  Min: 85  Max: 90  Blood Pressure: Systolic (24hrs), Av , Min:137 , Max:150   ; Diastolic (24hrs), Av, Min:74, Max:84  SpO2: SpO2  Av.4 %  Min: 91 %  Max: 98 %      24hr Intake/Output:   Intake/Output Summary (Last 24 hours) at 2024 0746  Last data filed at 2024 0545  Gross per 24 hour   Intake 240 ml   Output 1020 ml   Net -780 ml       Vitals: BP (!) 141/74   Pulse 90   Temp 98.4 °F (36.9 °C)   Resp 17   Ht 1.676 m (5' 6\")   Wt 64.8 kg (142 lb 13.7 oz)   SpO2 93%   BMI 23.06 kg/m²     Physical Exam:  Constitutional: Sleeping on arrival. Seems drowsy compared to yesterday. Appears 
To 2 west via bed-condition stable  
Wedding rings x2 given to .  
XR here for post op films as ordered.  Pt. Tolerated well. Circ, checks intact.  
edema. RLE with post-op dressing in place, c/d/I. N/V intact.  Neurological: Alert, awake, and orientated x 3. Motor and sensory grossly intact. No focal deficits. GCS of 15.    LABORATORY RESULTS (LAST 24 HOURS)     CBC with Differential:    Lab Results   Component Value Date/Time    WBC 11.7 07/27/2024 05:07 AM    RBC 3.65 07/27/2024 05:07 AM    HGB 11.4 07/27/2024 05:07 AM    HCT 33.9 07/27/2024 05:07 AM     07/27/2024 05:07 AM    MCV 92.9 07/27/2024 05:07 AM    MCH 31.2 07/27/2024 05:07 AM    MCHC 33.6 07/27/2024 05:07 AM    RDW 12.9 07/27/2024 05:07 AM    LYMPHOPCT 5.3 07/27/2024 05:07 AM    MONOPCT 8.4 07/27/2024 05:07 AM    EOSPCT 0.2 07/27/2024 05:07 AM    BASOPCT 0.1 07/27/2024 05:07 AM    MONOSABS 1.0 07/27/2024 05:07 AM    LYMPHSABS 0.6 07/27/2024 05:07 AM    EOSABS 0.0 07/27/2024 05:07 AM    BASOSABS 0.0 07/27/2024 05:07 AM     CMP:    Lab Results   Component Value Date/Time     07/27/2024 05:07 AM    K 4.1 07/27/2024 05:07 AM     07/27/2024 05:07 AM    CO2 28 07/27/2024 05:07 AM    BUN 31 07/27/2024 05:07 AM    CREATININE 0.73 07/27/2024 05:07 AM    LABGLOM 88.5 07/27/2024 05:07 AM    LABGLOM >60.0 04/24/2023 05:59 AM    GLUCOSE 128 07/27/2024 05:07 AM    CALCIUM 8.5 07/27/2024 05:07 AM    BILITOT <0.2 07/26/2024 11:00 PM    ALKPHOS 67 07/26/2024 11:00 PM    AST 19 07/26/2024 11:00 PM    ALT 16 07/26/2024 11:00 PM     Magnesium:    Lab Results   Component Value Date/Time    MG 2.4 04/12/2023 08:30 AM     PT/INR:    Lab Results   Component Value Date/Time    PROTIME 14.0 07/26/2024 11:15 PM    INR 1.1 07/26/2024 11:15 PM     PTT:    Lab Results   Component Value Date/Time    APTT 22.8 07/26/2024 11:15 PM       IMAGING RESULTS (PERSONALLY REVIEWED)     All admission and follow up imaging reviewed.    ASSESSMENT & PLAN     Diagnoses:  S/p mechanical fall from standing on 7/25/24  right femoral neck fracture (s/p right hip hemiarthroplasty with Dr. Kaelyn Michele on 7/27)  Acute pain due to 
require any physical supervision or assistance from another person for activity completion. Device may be needed.  Stand by assistance = pt requires verbal cues or instructions from another person, close to but not touching, to perform the activity  Minimal assistance= pt performs 75% or more of the activity; assistance is required to complete the activity  Moderate assistance= pt performs 50% of the activity; assistance is required to complete the activity  Maximal assistance = pt performs 25% of the activity; assistance is required to complete the activity  Dependent = pt requires total physical assistance to accomplish the task

## 2024-07-30 NOTE — PROGRESS NOTES
Pt admitted to room 258. Pt alert and oriented. Aquacell dressing noted to right hip. 5/10 pain noted to right hip. Call light within reach. Will continue to monitor

## 2024-07-30 NOTE — CARE COORDINATION
AYLINW met with the pt and her spouse at bedside to confirm the plan for Letty Fat[facility].  [facility].  Awaiting precert approval for transfer.    
[employer] Medicare benefits obtained and clinicals sent via [employer] portal.  Sophie Ross, Soc [employer] is aware that MMO benefits are secondary.  Sophie and patient were on the phone with me and pt is adamant that MMO is primary.  If MMO continues to claim they are not primary insurer, then patient will not be able to admit for SAC. Sophie will encouraging patient to contact O for clarification of this concern.  I s/w Evie from [facility] and the call ref# is [id number].   
SANJUANITA SPOKE WITH CUSTOMER SERVICE AT [employer] TO EXPLAIN THAT THE PT'S INSURANCE IS SHOWING AS SECONDARY BUT IT SHOULD BE PRIMARY.  CUSTOMER SERVICE VERIFIED THAT THE MEDICAL INSURANCE IS PRIMARY AND THAT THEY HAVE APPROVED THE PT FOR TRANSFER TO [facility] FOR TODAY.  AUTHORIZATION NUMBER IS [id number].  THIS INFORMATION WAS CALLED TO NIALL AT [facility] AND SHE WILL ADMIT THE PT TODAY.    
Return to Present Housing: No  Other Identified Issues/Barriers to RETURNING to current housing: WORK UP  Potential Assistance needed at discharge: N/A            Potential DME:    Patient expects to discharge to: Acute rehab  Plan for transportation at discharge:      Financial    Payor: [employer] / Plan: [employer]MIUM PPO / Product Type: *No Product type* /     Does insurance require precert for SNF: Yes    Potential assistance Purchasing Medications: No  Meds-to-Beds request:        [id number] - [place], [place] - [address] [phone] - F [phone]  [address] [facility] [address]  Phone: [phone] Fax: [phone]      Notes:    Factors facilitating achievement of predicted outcomes: Family support, Friend support, Motivated, Cooperative, and Pleasant    Barriers to discharge: Lower extremity weakness, Medical complications, and Medication managment    Additional Case Management Notes: MET WITH PATIENT, FROM HOME WITH SPOUSE.  PT WAS INDEPENDENT, NO DME, NO O2 PRIOR TO FALL.  PT WAS DRIVING PRIOR TO FALL.  PT/OT REC. SNF.  DISCUSSED WITH PATIENT, FREEDOM OF CHOICE GIVEN, PT WOULD LIKE [facility] AS SHE HAS BEEN THERE IN THE PAST, REFERRAL CALLED TO [place] SUPERVISOR NIALL.      The Plan for Transition of Care is related to the following treatment goals of Closed fracture of neck of right femur, initial encounter (HCC) [S72.001A]  Fall, initial encounter [W19.XXXA]  Closed displaced fracture of right femoral neck (HCC) [S72.001A]    IF APPLICABLE: The Patient and/or patient representative Kristie and her family were provided with a choice of provider and agrees with the discharge plan. Freedom of choice list with basic dialogue that supports the patient's individualized plan of care/goals and shares the quality data associated with the providers was provided to: Patient   Patient Representative Name:       The Patient and/or Patient

## 2024-07-30 NOTE — DISCHARGE SUMMARY
DISCHARGE SUMMARY   [facility]  [address] [address] Kristie Jason  MRN: [id number]  YOB: 1955  68 y.o.female      Attending  Marianne Mcfarland MD ?   Date of Admission  7/26/2024 Date of Discharge  7/30/2024      ?  DIAGNOSES:  Principal Problem (Resolved):    Closed displaced fracture of right femoral neck (HCC)  Active Problems:    Acute pain due to trauma    Post-op pain  Resolved Problems:    Fall       PROCEDURES:  7/27/2024: Orthopaedic Surgery, Dr. Kaelyn Michele   1) Right Hip Hemiarthroplasty for a Femoral Neck Fracture      DISCHARGE MEDICATIONS:  Current Discharge Medication List             Details   gabapentin (NEURONTIN) 100 MG capsule Take 1 capsule by mouth.      fluocinonide (LIDEX) 0.05 % external solution Apply topically 2 times daily. For 5 days  Qty: 60 mL, Refills: 1    Associated Diagnoses: Contact dermatitis of scalp      trimethobenzamide (TIGAN) 100 MG/ML injection Inject 2 mLs into the muscle every 6 hours as needed for Nausea  Qty: 100 mL, Refills: 0      polyethylene glycol (MIRALAX) 17 g PACK packet Take 17 g by mouth daily      atorvastatin (LIPITOR) 20 MG tablet Take 1 tablet by mouth daily      lisinopril (PRINIVIL;ZESTRIL) 5 MG tablet Take 1 tablet by mouth daily      venlafaxine (EFFEXOR XR) 150 MG extended release capsule 2 capsules daily      buPROPion (WELLBUTRIN XL) 300 MG extended release tablet 1 tablet every morning      amitriptyline (ELAVIL) 25 MG tablet 1 tablet nightly      Multiple Vitamin (MULTIVITAMINS PO)       Calcium Carbonate-Vit D-Min (CALCIUM 1200 PO)              REASON FOR HOSPITALIZATION:  Kristie Jason is a 68 y.o. female with a PMHx of osteoarthritis and HTN who presented to [facility] ED on 7/26/24 s/p mechanical fall from standing while at home. (-) head strike, (-) LOC, (-) AC/AP.     Trauma workup found right femoral neck fracture.      SIGNIFICANT FINDINGS:  Catalog of Injuries:   Diagnoses:  S/p mechanical fall

## 2024-07-30 NOTE — FLOWSHEET NOTE
Pt has been c/o a pill stuck in her throat since 21:38 yesterday.  Pt insist that the doctor come and look Trauma was called .Gave pt a sip of water after I got her up in the chair and no choking noted swallowed the water with no problem. Pt NPO until seen by doctor(Trauma).Electronically signed by Jo Ann Molina LPN on 7/30/2024 at 8:24 AM  Trauma up to see pt said for her to drink warm tea they think it is just a build up of mucus and needs to be thinned out.  Pt does not want to take morning medication states may be later.   her today pt did get up in chair and is doing well.Electronically signed by Jo Ann Molina LPN on 7/30/2024 at 10:50 AM  Report calledto [facility] aware the  time is 3:00PM.Electronically signed by Jo Ann Molina LPN on 7/30/2024 at 1:32 PM  Pt in room in the chair denies any pain ate 75% of her lunch tolerated well no choking noted.Electronically signed by Jo Ann Molina LPN on 7/30/2024 at 2:19 PM  Assisted pt back into bed for nap before she goes to [facility].Electronically signed by Jo Ann Molina LPN on 7/30/2024 at 2:41 PM  Assisted pt into the chair and she did most of the work.  Pt took a hour nap and is refreshed  took pt belongings

## 2024-07-31 PROBLEM — S72.061K: Status: ACTIVE | Noted: 2024-07-31

## 2024-07-31 LAB
AMORPH SED URNS QL MICRO: ABNORMAL
BACTERIA URNS QL MICRO: ABNORMAL /HPF
BILIRUB UR QL STRIP: NEGATIVE
CLARITY UR: ABNORMAL
COLOR UR: YELLOW
EPI CELLS #/AREA URNS HPF: ABNORMAL /HPF
GLUCOSE UR STRIP-MCNC: NEGATIVE MG/DL
HGB UR QL STRIP: NEGATIVE
KETONES UR STRIP-MCNC: NEGATIVE MG/DL
LEUKOCYTE ESTERASE UR QL STRIP: NEGATIVE
MUCOUS THREADS URNS QL MICRO: PRESENT /LPF
NITRITE UR QL STRIP: NEGATIVE
PH UR STRIP: 7 [PH] (ref 5–9)
PROT UR STRIP-MCNC: 30 MG/DL
RBC #/AREA URNS HPF: ABNORMAL /HPF (ref 0–2)
SP GR UR STRIP: 1.02 (ref 1–1.03)
UROBILINOGEN UR STRIP-ACNC: 0.2 E.U./DL
WBC #/AREA URNS HPF: ABNORMAL /HPF (ref 0–5)

## 2024-07-31 PROCEDURE — 97110 THERAPEUTIC EXERCISES: CPT

## 2024-07-31 PROCEDURE — 81001 URINALYSIS AUTO W/SCOPE: CPT

## 2024-07-31 PROCEDURE — 97116 GAIT TRAINING THERAPY: CPT

## 2024-07-31 PROCEDURE — 97166 OT EVAL MOD COMPLEX 45 MIN: CPT

## 2024-07-31 PROCEDURE — 97530 THERAPEUTIC ACTIVITIES: CPT

## 2024-07-31 PROCEDURE — 6370000000 HC RX 637 (ALT 250 FOR IP): Performed by: INTERNAL MEDICINE

## 2024-07-31 PROCEDURE — 1200000002 HC SEMI PRIVATE SWING BED

## 2024-07-31 PROCEDURE — 97535 SELF CARE MNGMENT TRAINING: CPT

## 2024-07-31 PROCEDURE — 6370000000 HC RX 637 (ALT 250 FOR IP): Performed by: PHYSICIAN ASSISTANT

## 2024-07-31 PROCEDURE — 6360000002 HC RX W HCPCS: Performed by: PHYSICIAN ASSISTANT

## 2024-07-31 PROCEDURE — 97162 PT EVAL MOD COMPLEX 30 MIN: CPT

## 2024-07-31 RX ORDER — VENLAFAXINE HYDROCHLORIDE 37.5 MG/1
75 CAPSULE, EXTENDED RELEASE ORAL DAILY
Status: COMPLETED | OUTPATIENT
Start: 2024-07-31 | End: 2024-08-04

## 2024-07-31 RX ORDER — LISINOPRIL 5 MG/1
5 TABLET ORAL DAILY
Status: DISCONTINUED | OUTPATIENT
Start: 2024-07-31 | End: 2024-08-14 | Stop reason: HOSPADM

## 2024-07-31 RX ORDER — SENNOSIDES A AND B 8.6 MG/1
1 TABLET, FILM COATED ORAL 2 TIMES DAILY
Status: DISCONTINUED | OUTPATIENT
Start: 2024-07-31 | End: 2024-08-14 | Stop reason: HOSPADM

## 2024-07-31 RX ORDER — ACETAMINOPHEN 325 MG/1
650 TABLET ORAL EVERY 6 HOURS PRN
Status: DISCONTINUED | OUTPATIENT
Start: 2024-07-31 | End: 2024-08-14 | Stop reason: HOSPADM

## 2024-07-31 RX ADMIN — SENNOSIDES AND DOCUSATE SODIUM 1 TABLET: 50; 8.6 TABLET ORAL at 20:04

## 2024-07-31 RX ADMIN — AMITRIPTYLINE HYDROCHLORIDE 25 MG: 25 TABLET, FILM COATED ORAL at 20:04

## 2024-07-31 RX ADMIN — POLYETHYLENE GLYCOL 3350 17 G: 17 POWDER, FOR SOLUTION ORAL at 09:21

## 2024-07-31 RX ADMIN — SENNOSIDES 8.6 MG: 8.6 TABLET, FILM COATED ORAL at 12:28

## 2024-07-31 RX ADMIN — BUPROPION HYDROCHLORIDE 300 MG: 150 TABLET, EXTENDED RELEASE ORAL at 09:21

## 2024-07-31 RX ADMIN — ACETAMINOPHEN 650 MG: 325 TABLET ORAL at 05:10

## 2024-07-31 RX ADMIN — MAGNESIUM HYDROXIDE 30 ML: 2400 SUSPENSION ORAL at 12:28

## 2024-07-31 RX ADMIN — LISINOPRIL 5 MG: 5 TABLET ORAL at 09:21

## 2024-07-31 RX ADMIN — ATORVASTATIN CALCIUM 20 MG: 10 TABLET, FILM COATED ORAL at 09:21

## 2024-07-31 RX ADMIN — ENOXAPARIN SODIUM 30 MG: 100 INJECTION SUBCUTANEOUS at 09:21

## 2024-07-31 RX ADMIN — VENLAFAXINE HYDROCHLORIDE 75 MG: 37.5 CAPSULE, EXTENDED RELEASE ORAL at 09:20

## 2024-07-31 RX ADMIN — SENNOSIDES 8.6 MG: 8.6 TABLET, FILM COATED ORAL at 20:04

## 2024-07-31 RX ADMIN — METHOCARBAMOL TABLETS 500 MG: 500 TABLET, COATED ORAL at 05:10

## 2024-07-31 RX ADMIN — ENOXAPARIN SODIUM 30 MG: 100 INJECTION SUBCUTANEOUS at 20:03

## 2024-07-31 RX ADMIN — SENNOSIDES AND DOCUSATE SODIUM 1 TABLET: 50; 8.6 TABLET ORAL at 09:21

## 2024-07-31 ASSESSMENT — PAIN - FUNCTIONAL ASSESSMENT: PAIN_FUNCTIONAL_ASSESSMENT: PREVENTS OR INTERFERES SOME ACTIVE ACTIVITIES AND ADLS

## 2024-07-31 ASSESSMENT — PAIN DESCRIPTION - DESCRIPTORS: DESCRIPTORS: ACHING

## 2024-07-31 ASSESSMENT — PAIN SCALES - GENERAL: PAINLEVEL_OUTOF10: 5

## 2024-07-31 ASSESSMENT — PAIN DESCRIPTION - LOCATION: LOCATION: HIP

## 2024-07-31 ASSESSMENT — PAIN DESCRIPTION - ORIENTATION: ORIENTATION: RIGHT

## 2024-07-31 NOTE — H&P
Hospital Medicine  History and Physical    Patient:  Kristie Jason  MRN: [id number]    CHIEF COMPLAINT:  No chief complaint on file.      History Obtained From:  Patient, EMR  Primary Care Physician: Flor Calderón MD    HISTORY OF PRESENT ILLNESS:   The patient is a 68 y.o. female with PMH of hypertension and depression.  Patient tripped on her cat.  She presented with right hip pain and found to have fracture.  She underwent repair and the subsequently recommended to be admitted to skilled unit for inpatient physical and Occupational Therapy.  Patient is able to provide basic information.  Unable to engage in complex conversation.      Past Medical History:      Diagnosis Date    Anemia     Depression     Headache     Hypertension     Osteoarthritis        Past Surgical History:      Procedure Laterality Date    HIP SURGERY Right 7/27/2024    RIGHT HIP HEMIARTHROPLASTY performed by Kaelyn Walter MD at [facility] OR    HYSTERECTOMY (CERVIX STATUS UNKNOWN)      RECTAL SURGERY         Medications Prior to Admission:    Prior to Admission medications    Medication Sig Start Date End Date Taking? Authorizing Provider   gabapentin (NEURONTIN) 100 MG capsule Take 1 capsule by mouth. 3/9/24 6/7/24  ProviderHumberto MD   fluocinonide (LIDEX) 0.05 % external solution Apply topically 2 times daily. For 5 days 4/5/24   Shukri Payne, APRN - CNP   trimethobenzamide (TIGAN) 100 MG/ML injection Inject 2 mLs into the muscle every 6 hours as needed for Nausea  Patient not taking: Reported on 4/5/2024 4/12/23   Kaylene Ferrera PA   polyethylene glycol (MIRALAX) 17 g PACK packet Take 17 g by mouth daily    Humberto Enrique MD   atorvastatin (LIPITOR) 20 MG tablet Take 1 tablet by mouth daily    Humberto Enrique MD   lisinopril (PRINIVIL;ZESTRIL) 5 MG tablet Take 1 tablet by mouth daily    Humberto Enrique MD   venlafaxine (EFFEXOR XR) 150 MG extended release capsule 2 capsules daily    Humberto Enrique  UA    Monitor electrolytes, kidney function, WBC and hemoglobin Twice a week or more frequent if needed.    Carol Montemayor MD, MD  Admitting Hospitalist    TTS: 85mins where I focused more than 75% of my attention on rendering care, and planning treatment course for this patient, in addition to talking to RN team, mid levels, consulting with other physicians and following up on labs and imaging.    High Risk Readmission Screening Tool Score Noted.     Emergency Contact:

## 2024-07-31 NOTE — PROGRESS NOTES
Facility/Department: [facility] SUBACUTE UNIT  Occupational Therapy Initial Assessment    Name: Kristie Jason  : 1955  MRN: [id number]  Date of Service: 2024    Discharge Recommendations:  Continue to assess pending progress, Home with Home health OT          Patient Diagnosis(es): There were no encounter diagnoses.  Past Medical History:  has a past medical history of Anemia, Depression, Headache, Hypertension, and Osteoarthritis.  Past Surgical History:  has a past surgical history that includes Hysterectomy; Rectal surgery; and hip surgery (Right, 2024).    Treatment Diagnosis: Decreased ADL/IADL performance d/t fall, R femur fx with hemiarthroplasty      Assessment   Performance deficits / Impairments: Decreased functional mobility ;Decreased ADL status;Decreased strength;Decreased safe awareness;Decreased endurance;Decreased fine motor control;Decreased high-level IADLs;Decreased vision/visual deficit;Decreased balance;Decreased coordination;Decreased posture  Assessment: Pt is a 67 y/o F admitted to [facility] for skilled therapy after sustaining fall at home, pt had R hip hemiarthoplasty on 24 and is WBAT RLE. Pt reports falls in the past, she does not use a device, lives with spouse at baseline. Pt completes ADLs at Mod I level and she shares IADLs with . OT eval completed with pt not feeling well, reports she is nauseated and dizzy, vitals measured and WNL. Pt assisted to [facility] and was set up from seated level for grooming tasks. Anticipate pt will require 10-14 days of skilled therapy prior to d/c home with [facility] services to ensure safe transition home.  Treatment Diagnosis: Decreased ADL/IADL performance d/t fall, R femur fx with hemiarthroplasty  REQUIRES OT FOLLOW-UP: Yes  Activity Tolerance  Activity Tolerance: Patient limited by pain;Patient limited by fatigue        Plan   Occupational Therapy Plan  Times Per Week: 4-7  Times Per Day: Once a day  Current Treatment Recommendations:

## 2024-07-31 NOTE — PROGRESS NOTES
Physical Therapy  Facility/Department: [facility] MED SURG UNIT  Daily Treatment Note  NAME: Kristie Jason  : 1955  MRN: [id number]    Date of Service: 2024    Discharge Recommendations:  Continue to assess pending progress, Home with assist PRN, Home with Home health PT, Outpatient PT        Patient Diagnosis(es): There were no encounter diagnoses.    Assessment   Assessment: (P) Pt. limited by complaints dizzines at arrival. Monitored and noted vitals for safety. Pt. requires min assistance with tsf's in p.m. and tolerates ambulation up to 15 feet x 2 with encouragement. Pt. demo's step to gait with low tolerance to weight bear on R LE. Extra time needed in between tsf's and gait due to complaints of dizziness for safety. AAROM tolerated with 10 reps of R LE hip ROM seated to assist with functional strength with reducing pain. Assisted pt in chair post intervention.  Activity Tolerance: (P) Other (comment) (limited by complaints of dizziness.)     Plan    Physical Therapy Plan  General Plan: 5-7 times per week (1-2)  Current Treatment Recommendations: Strengthening;ROM;Balance training;Functional mobility training;Transfer training;Endurance training;Wheelchair mobility training;Gait training;Stair training;Neuromuscular re-education;Pain management;Home exercise program;Safety education & training;Patient/Caregiver education & training;Equipment evaluation, education, & procurement;Modalities;Positioning;Therapeutic activities     Restrictions  Restrictions/Precautions  Restrictions/Precautions: Weight Bearing  Implants present? : Metal implants (R hip hemiarthoplasty)  Lower Extremity Weight Bearing Restrictions  Right Lower Extremity Weight Bearing: Weight Bearing As Tolerated     Subjective    Subjective  Subjective: Pt. c/o dizziness more at arrival than R hip pain. Pt. reports R hip pain either 2 or 3/10 sitting.  Pain: Reports no pain she just feels dizzy  Orientation  Overall Orientation Status: Within

## 2024-07-31 NOTE — PLAN OF CARE
Nutrition Problem #1: Inadequate oral intake  Intervention: Food and/or Nutrient Delivery: Continue Current Diet, Continue Oral Nutrition Supplement

## 2024-07-31 NOTE — PROGRESS NOTES
[facility]    [place] History and Physical    Date of Admission:  2019  4:47 AM    Primary Care Physician   Primary care provider: No Ref-Primary, Physician    Assessment & Plan   Female-Divine Hernandez is a Post term  appropriate for gestational age female  , doing well.   -Normal  care  -Anticipatory guidance given  -Encourage exclusive breastfeeding  -Hearing screen and first hepatitis B vaccine prior to discharge per orders    Asim Lozoya    Pregnancy History   The details of the mother's pregnancy are as follows:  OBSTETRIC HISTORY:  Information for the patient's mother:  Divine Hernandez [[id number]]   28 year old    EDC:   Information for the patient's mother:  Divine Hernandez Cherellemarlene [[id number]]   Estimated Date of Delivery: 7/15/19    Information for the patient's mother:  Divine Hernandez Cherellemarlene [[id number]]     OB History    Para Term  AB Living   2 1 1 0 1 1   SAB TAB Ectopic Multiple Live Births   0 0 0 0 1      # Outcome Date GA Lbr William/2nd Weight Sex Delivery Anes PTL Lv   2 Term 19 41w1d 08:30 / 05:17 3.51 kg (7 lb 11.8 oz) F CS-LTranv EPI  [place]      Complications: Cephalopelvic Disproportion, Failure to Progress in Second Stage      Name: SUZIE HERNANDEZ      Apgar1: 9  Apgar5: 9   1 AB                Prenatal Labs:   Information for the patient's mother:  Divine Hernandez [[id number]]     Lab Results   Component Value Date    ABO A 2019    RH Pos 2019    AS Neg 2019    HEPBANG Negative 2019    HGB 2019       Prenatal Ultrasound:  Information for the patient's mother:  Divine Hernandez Cherellemarlene [[id number]]   No results found for this or any previous visit.      GBS Status:   Information for the patient's mother:  Divine Hernandez [[id number]]     Lab Results   Component Value Date    GBS Positive 2019     Positive - Treated    Maternal History    Maternal past medical  Spiritual Health Assessment/Progress Note  [facility]    Initial Encounter,  ,  ,      Name: Kristie Jason MRN: [id number]    Age: 68 y.o.     Sex: female   Language: English   Episcopalian: Non-Yarsani   Closed displaced articular fracture of head of right femur with nonunion     Date: 7/31/2024            Total Time Calculated: 20 min              Spiritual Assessment began in [facility]        Referral/Consult From: Rounding   Encounter Overview/Reason: Initial Encounter  Service Provided For: Patient    Cynthia, Belief, Meaning:   Patient is connected with a cynthia tradition or spiritual practice  Family/Friends Other: unknown      Importance and Influence:  Patient Other: unknown  Family/Friends Other: unknown    Community:  Patient is connected with a spiritual community  Family/Friends Other: unknown    Assessment and Plan of Care:     Patient Interventions include: Facilitated expression of thoughts and feelings  Family/Friends Interventions include: Other: unknown    Patient Plan of Care: Spiritual Care available upon further referral  Family/Friends Plan of Care: Other: unknown    Electronically signed by Chaplain Abram Intern on 7/31/2024 at 4:17 PM   "history, problem list and prior to admission medications reviewed and unremarkable.    Medications given to Mother since admit:  reviewed     Family History - Reagan   This patient has no significant family history    Social History -    This  has no significant social history    Birth History   Infant Resuscitation Needed: no     Birth Information  Birth History     Birth     Length: 0.495 m (1' 7.5\")     Weight: 3.51 kg (7 lb 11.8 oz)     HC 34.9 cm (13.75\")     Apgar     One: 9     Five: 9     Delivery Method: , Low Transverse     Gestation Age: 41 1/7 wks     Duration of Labor: 1st: 8h 30m / 2nd: 5h 17m           Immunization History   Immunization History   Administered Date(s) Administered     Hep B, Peds or Adolescent 2019        Physical Exam   Vital Signs:  Patient Vitals for the past 24 hrs:   Temp Temp src Heart Rate Resp Height Weight   19 0800 98.9  F (37.2  C) Axillary 130 40 -- --   19 0610 99  F (37.2  C) Axillary 160 58 -- --   19 0550 98  F (36.7  C) Axillary 152 52 -- --   19 0510 98.2  F (36.8  C) Axillary 152 52 -- --   19 0447 98.2  F (36.8  C) Axillary 152 52 0.495 m (1' 7.5\") 3.51 kg (7 lb 11.8 oz)     Reagan Measurements:  Weight: 7 lb 11.8 oz (3510 g)    Length: 19.5\"    Head circumference: 34.9 cm      General:  alert and normally responsive  Skin:  no abnormal markings; normal color without significant rash.  No jaundice  Head/Neck  normal anterior and posterior fontanelle, intact scalp; Neck without masses.  Eyes  normal red reflex  Ears/Nose/Mouth:  intact canals, patent nares, mouth normal  Thorax:  normal contour, clavicles intact  Lungs:  clear, no retractions, no increased work of breathing  Heart:  normal rate, rhythm.  No murmurs.  Normal femoral pulses.  Abdomen  soft without mass, tenderness, organomegaly, hernia.  Umbilicus normal.  Genitalia:  normal female external genitalia  Anus:  patent  Trunk/Spine  " straight, intact  Musculoskeletal:  Normal Llanos and Ortolani maneuvers.  intact without deformity.  Normal digits.  Neurologic:  normal, symmetric tone and strength.  normal reflexes.    Data    All laboratory data reviewed  No results found for this or any previous visit (from the past 24 hour(s)).

## 2024-07-31 NOTE — PROGRESS NOTES
Facility/Department: [facility]   Physical Therapy Initial Assessment    Name: Kristie Jason  : 1955  MRN: [id number]  Date of Service: 2024    Discharge Recommendations:  Continue to assess pending progress, Home with assist PRN, Home with Home health PT, Outpatient PT          Patient Diagnosis(es): s/p Right hip hemiarthroplasty on 24     Past Medical History:  has a past medical history of Anemia, Depression, Headache, Hypertension, and Osteoarthritis.  Past Surgical History:  has a past surgical history that includes Hysterectomy; Rectal surgery; and hip surgery (Right, 2024).    Assessment   Body Structures, Functions, Activity Limitations Requiring Skilled Therapeutic Intervention: Decreased functional mobility ;Decreased ROM;Decreased strength;Decreased endurance;Decreased balance;Increased pain  Assessment: Pt is a 67 yo female who reports that she tripped over her cat and fx her right hip 24 and had a right partial hip replacement on 24 and now her for LUTHER rehab. PT completed a LUTHER evaluation and assisted pt OOB and ambulated to the recliner. Pt needed vqs for safe technique and advised pt to not use the purewic during the day and to call for assistance and use the BSC until she can ambulate to the restroom so that we can simulate what pt will do once she get home. PT is recommending 2 weeks LUTHER and then DC home with [facility] PT and then outpt PT.  Treatment Diagnosis: s/p Right hemiarthoplasty on 24  Therapy Prognosis: Good  Decision Making: Medium Complexity  Requires PT Follow-Up: Yes     Plan   Physical Therapy Plan  General Plan: 5-7 times per week (QD to BID)  Current Treatment Recommendations: Strengthening, ROM, Balance training, Functional mobility training, Transfer training, Endurance training, Wheelchair mobility training, Gait training, Neuromuscular re-education, Manual, Pain management, Home exercise program, Safety education & training, Patient/Caregiver education

## 2024-07-31 NOTE — PLAN OF CARE
Problem: Musculoskeletal - Adult  Goal: Return mobility to safest level of function  Outcome: Progressing     Problem: Musculoskeletal - Adult  Goal: Maintain proper alignment of affected body part  Outcome: Progressing     Problem: Musculoskeletal - Adult  Goal: Return ADL status to a safe level of function  Outcome: Progressing

## 2024-07-31 NOTE — PROGRESS NOTES
Comprehensive Nutrition Assessment    Type and Reason for Visit:  Initial    Nutrition Recommendations/Plan:   Continue dysphagia, bite size diet as ordered  PO >75% Meals  Continue ONS as ordered frozsen supplement TID, standard high calorie high protein TID.      Malnutrition Assessment:  Malnutrition Status:  No malnutrition (07/31/24 1255)    Context:  Acute Illness     Findings of the 6 clinical characteristics of malnutrition:  Energy Intake:  Mild decrease in energy intake (Comment)  Weight Loss:  No significant weight loss     Body Fat Loss:  No significant body fat loss     Muscle Mass Loss:  No significant muscle mass loss    Fluid Accumulation:  No significant fluid accumulation     Strength:  Not Performed    Nutrition Assessment:    New admit to subacute. PO is slightly low for needs but ONS in place. No malnutrition, weights stable.    Nutrition Related Findings:    Admit to subacute rehab related to right femur fracture. \"The patient is a 68 y.o. female with PMH of hypertension and depression.  Patient tripped on her cat.  She presented with right hip pain and found to have fracture.  She underwent repair and the subsequently recommended to be admitted to skilled unit for inpatient physical and Occupational Therapy.  Patient is able to provide basic information.  Unable to engage in complex conversation. \"  Admit weight 143#, within UBWR per EMR weight history. Diet is disphagia soft/bite size. ONS in place all meals, (standard high calorie/high protein and frozen ONS). PO 51-75% meals. Meds reviewed; surgical incision to right femur. Labs reviewed 7/29. Last BM per EMR 7/27, Wound Type: Surgical Incision (right hip)       Current Nutrition Intake & Therapies:    Average Meal Intake: 51-75%  Average Supplements Intake: Unable to assess  ADULT DIET; Dysphagia - Soft and Bite Sized  ADULT ORAL NUTRITION SUPPLEMENT; Breakfast, Lunch, Dinner; Standard High Calorie/High Protein Oral Supplement  ADULT  ORAL NUTRITION SUPPLEMENT; Breakfast, Lunch, Dinner; Frozen Oral Supplement    Anthropometric Measures:  Height: 167 cm (5' 5.75\")  Ideal Body Weight (IBW): 129 lbs (59 kg)    Admission Body Weight: 63.5 kg (140 lb) (7/30/24)  Current Body Weight: 64.9 kg (143 lb), 110.9 % IBW. Weight Source: Not Specified  Current BMI (kg/m2): 23.3  Usual Body Weight: 62.6 kg (138 lb) (4/5/24)  % Weight Change (Calculated): 3.6  BMI Categories: Normal Weight (BMI 22.0 to 24.9) age over 65    Estimated Daily Nutrient Needs:  Energy Requirements Based On: Kcal/kg  Weight Used for Energy Requirements: Current  Energy (kcal/day): 8612-4353 (25-27kcal/kg)  Weight Used for Protein Requirements: Current  Protein (g/day): 65-78 (1-1.2gm/kg)  Method Used for Fluid Requirements: 1 ml/kcal  Fluid (ml/day): 4295-0716    Nutrition Diagnosis:   Inadequate oral intake related to inadequate protein-energy intake, acute injury/trauma as evidenced by intake 51-75%      Nutrition Interventions:   Food and/or Nutrient Delivery: Continue Current Diet, Continue Oral Nutrition Supplement  Nutrition Education/Counseling: No recommendation at this time  Coordination of Nutrition Care: Continue to monitor while inpatient       Goals:     Goals: PO intake 75% or greater       Nutrition Monitoring and Evaluation:      Food/Nutrient Intake Outcomes: Food and Nutrient Intake, Diet Advancement/Tolerance, Supplement Intake  Physical Signs/Symptoms Outcomes: Biochemical Data, Weight, Meal Time Behavior    Discharge Planning:    Too soon to determine     Tarsha Sequeira RD

## 2024-08-01 LAB
ANION GAP SERPL CALCULATED.3IONS-SCNC: 9 MEQ/L (ref 9–15)
BASOPHILS # BLD: 0 K/UL (ref 0–0.1)
BASOPHILS NFR BLD: 0.3 % (ref 0.1–1.2)
BUN SERPL-MCNC: 32 MG/DL (ref 8–23)
CALCIUM SERPL-MCNC: 8.7 MG/DL (ref 8.5–9.9)
CHLORIDE SERPL-SCNC: 98 MEQ/L (ref 95–107)
CO2 SERPL-SCNC: 31 MEQ/L (ref 20–31)
CREAT SERPL-MCNC: 0.61 MG/DL (ref 0.5–0.9)
EOSINOPHIL # BLD: 0.1 K/UL (ref 0–0.4)
EOSINOPHIL NFR BLD: 1.5 % (ref 0.7–5.8)
ERYTHROCYTE [DISTWIDTH] IN BLOOD BY AUTOMATED COUNT: 13.2 % (ref 11.7–14.4)
GLUCOSE SERPL-MCNC: 123 MG/DL (ref 70–99)
HCT VFR BLD AUTO: 30.8 % (ref 37–47)
HGB BLD-MCNC: 10.6 G/DL (ref 11.2–15.7)
IMM GRANULOCYTES # BLD: 0 K/UL
IMM GRANULOCYTES NFR BLD: 0.3 %
LYMPHOCYTES # BLD: 0.7 K/UL (ref 1.2–3.7)
LYMPHOCYTES NFR BLD: 7.6 %
MCH RBC QN AUTO: 31.5 PG (ref 25.6–32.2)
MCHC RBC AUTO-ENTMCNC: 34.4 % (ref 32.2–35.5)
MCV RBC AUTO: 91.7 FL (ref 79.4–94.8)
MONOCYTES # BLD: 1 K/UL (ref 0.2–0.9)
MONOCYTES NFR BLD: 11.6 % (ref 4.7–12.5)
NEUTROPHILS # BLD: 6.8 K/UL (ref 1.6–6.1)
NEUTS SEG NFR BLD: 78.7 % (ref 34–71.1)
PLATELET # BLD AUTO: 278 K/UL (ref 182–369)
POTASSIUM SERPL-SCNC: 3.7 MEQ/L (ref 3.4–4.9)
RBC # BLD AUTO: 3.36 M/UL (ref 3.93–5.22)
SODIUM SERPL-SCNC: 138 MEQ/L (ref 135–144)
TSH SERPL-MCNC: 2.46 UIU/ML (ref 0.44–3.86)
VITAMIN D 25-HYDROXY: 44.2 NG/ML (ref 30–100)
WBC # BLD AUTO: 8.7 K/UL (ref 4–10)

## 2024-08-01 PROCEDURE — 97530 THERAPEUTIC ACTIVITIES: CPT

## 2024-08-01 PROCEDURE — 84443 ASSAY THYROID STIM HORMONE: CPT

## 2024-08-01 PROCEDURE — 85025 COMPLETE CBC W/AUTO DIFF WBC: CPT

## 2024-08-01 PROCEDURE — 97110 THERAPEUTIC EXERCISES: CPT

## 2024-08-01 PROCEDURE — 80048 BASIC METABOLIC PNL TOTAL CA: CPT

## 2024-08-01 PROCEDURE — 97116 GAIT TRAINING THERAPY: CPT

## 2024-08-01 PROCEDURE — 6370000000 HC RX 637 (ALT 250 FOR IP): Performed by: INTERNAL MEDICINE

## 2024-08-01 PROCEDURE — 6370000000 HC RX 637 (ALT 250 FOR IP): Performed by: PHYSICIAN ASSISTANT

## 2024-08-01 PROCEDURE — 6360000002 HC RX W HCPCS: Performed by: PHYSICIAN ASSISTANT

## 2024-08-01 PROCEDURE — 1200000002 HC SEMI PRIVATE SWING BED

## 2024-08-01 PROCEDURE — 82306 VITAMIN D 25 HYDROXY: CPT

## 2024-08-01 PROCEDURE — 97535 SELF CARE MNGMENT TRAINING: CPT

## 2024-08-01 PROCEDURE — 36415 COLL VENOUS BLD VENIPUNCTURE: CPT

## 2024-08-01 RX ADMIN — TRAMADOL HYDROCHLORIDE 100 MG: 50 TABLET, COATED ORAL at 01:22

## 2024-08-01 RX ADMIN — BUPROPION HYDROCHLORIDE 300 MG: 150 TABLET, EXTENDED RELEASE ORAL at 08:47

## 2024-08-01 RX ADMIN — SENNOSIDES AND DOCUSATE SODIUM 1 TABLET: 50; 8.6 TABLET ORAL at 08:47

## 2024-08-01 RX ADMIN — ENOXAPARIN SODIUM 30 MG: 100 INJECTION SUBCUTANEOUS at 08:47

## 2024-08-01 RX ADMIN — ENOXAPARIN SODIUM 30 MG: 100 INJECTION SUBCUTANEOUS at 20:30

## 2024-08-01 RX ADMIN — TRAMADOL HYDROCHLORIDE 100 MG: 50 TABLET, COATED ORAL at 20:39

## 2024-08-01 RX ADMIN — VENLAFAXINE HYDROCHLORIDE 75 MG: 37.5 CAPSULE, EXTENDED RELEASE ORAL at 08:47

## 2024-08-01 RX ADMIN — SENNOSIDES 8.6 MG: 8.6 TABLET, FILM COATED ORAL at 08:47

## 2024-08-01 RX ADMIN — ATORVASTATIN CALCIUM 20 MG: 10 TABLET, FILM COATED ORAL at 08:47

## 2024-08-01 RX ADMIN — AMITRIPTYLINE HYDROCHLORIDE 25 MG: 25 TABLET, FILM COATED ORAL at 20:31

## 2024-08-01 RX ADMIN — SENNOSIDES 8.6 MG: 8.6 TABLET, FILM COATED ORAL at 20:30

## 2024-08-01 RX ADMIN — LISINOPRIL 5 MG: 5 TABLET ORAL at 08:47

## 2024-08-01 RX ADMIN — SENNOSIDES AND DOCUSATE SODIUM 1 TABLET: 50; 8.6 TABLET ORAL at 20:31

## 2024-08-01 ASSESSMENT — PAIN SCALES - WONG BAKER
WONGBAKER_NUMERICALRESPONSE: HURTS A LITTLE BIT

## 2024-08-01 ASSESSMENT — PAIN SCALES - GENERAL
PAINLEVEL_OUTOF10: 7
PAINLEVEL_OUTOF10: 10
PAINLEVEL_OUTOF10: 7
PAINLEVEL_OUTOF10: 0

## 2024-08-01 ASSESSMENT — PAIN DESCRIPTION - DESCRIPTORS
DESCRIPTORS: ACHING
DESCRIPTORS: ACHING

## 2024-08-01 ASSESSMENT — PAIN DESCRIPTION - LOCATION
LOCATION: HIP
LOCATION: HIP

## 2024-08-01 ASSESSMENT — PAIN DESCRIPTION - ORIENTATION
ORIENTATION: RIGHT
ORIENTATION: RIGHT

## 2024-08-01 ASSESSMENT — PAIN - FUNCTIONAL ASSESSMENT
PAIN_FUNCTIONAL_ASSESSMENT: PREVENTS OR INTERFERES SOME ACTIVE ACTIVITIES AND ADLS
PAIN_FUNCTIONAL_ASSESSMENT: PREVENTS OR INTERFERES SOME ACTIVE ACTIVITIES AND ADLS

## 2024-08-01 NOTE — PROGRESS NOTES
Physical Therapy  Facility/Department: [facility] MED SURG UNIT  Daily Treatment Note  NAME: Kristie Jason  : 1955  MRN: [id number]    Date of Service: 2024    Discharge Recommendations:  (P) Continue to assess pending progress, Home with assist PRN, Home with Home health PT, Outpatient PT        Patient Diagnosis(es): There were no encounter diagnoses.    Assessment   Assessment: (P) Pt. demo's slight improve weight bear tolerance and gait tolerance up to 35 feet in p.m. Pt. still limited with R LE stance time with step to gait. Occasional min assistance needed more for balance. Min assistance needed to improve tsf skills. Pt. progress with tolerating AROM but limited with all available planes of motion seated. VC's used to assist with technique and participation tolerance. Moreover, pt. education on Ankle pumps for circulation and reduce risk of blood clotting.   Activity Tolerance: (P) Patient limited by endurance;Patient limited by pain     Plan    Physical Therapy Plan  General Plan: (P) 5-7 times per week (1-2)  Current Treatment Recommendations: (P) Strengthening;ROM;Balance training;Functional mobility training;Transfer training;Endurance training;Wheelchair mobility training;Gait training;Stair training;Neuromuscular re-education;Pain management;Home exercise program;Safety education & training;Patient/Caregiver education & training;Equipment evaluation, education, & procurement;Modalities;Positioning;Therapeutic activities     Restrictions  Restrictions/Precautions  Restrictions/Precautions: Weight Bearing  Implants present? : Metal implants (R hip hemiarthoplasty)  Lower Extremity Weight Bearing Restrictions  Right Lower Extremity Weight Bearing: Weight Bearing As Tolerated     Subjective    Subjective  Subjective: (P) Pt. up in chair reporting low motivationhowever, cooperative. I have many excuses. Pt. reports R hip 6/10 and calf soreness 2/10.  Pain: 6/10 R hip  Orientation  Overall Orientation  increase ROM participation as raymundo., Hip flexion x 10' AROM with limited ROM, Hip abduction/adduction x 10' with feet flat on floor ( minimal ROM tolerance demo'd.  Other Specialty Interventions  Other Treatments/Modalities: (P) Pt. R gastrocneimus skin color normal and no complaints with palpation. Pt. educated on AP every hour to reduce risk of incident and improve circulation.  Safety Devices  Type of Devices: Left in chair;Call light within reach;Gait belt;Chair alarm in place       Goals  Short Term Goals  Time Frame for Short Term Goals: 1 week LUTHER  Short Term Goal 1: Min A for bed mobility  Short Term Goal 2: CGA with transfers  Short Term Goal 3: Pt able to ambulate with AD for 50' x 1 with CGA and safe technique  Short Term Goal 4: Pt able to ambulate up/down 1 steps with CGA  Short Term Goal 5: Pt able to tolerate 10 reps of B LE ther ex  Long Term Goals  Time Frame for Long Term Goals : 2 weeks LUTHER  Long Term Goal 1: Pt will demonstrate bed mobility indep  Long Term Goal 2: Pt will demonstrate transfers indep  Long Term Goal 3: Pt able to ambulate with AD for >125'x 1 with Mod indep for safe ambulation at home  Long Term Goal 4: Pt able to ambulate up/down 3 steps with HR on L and supervision for safe entry into her home  Long Term Goal 5: Pt indep with an advanced HEP to help to progress with pts strength  Patient Goals   Patient Goals : To be able to move better    Education       AM-PAC - Mobility              Therapy Time   Individual Concurrent Group Co-treatment   Time In  325         Time Out  405         Minutes  40                 Sagar Dunbar, PTA [id number]

## 2024-08-01 NOTE — PROGRESS NOTES
Following up on the patient.  Patient had bowel movements.  No abdominal pain no nausea, vomiting, diarrhea, dysuria hematuria    General appearance: alert, appears stated age and cooperative, no apparent distress.  Pale.  Cachectic and frail.  Skin: Skin color, texture, turgor normal. No rashes or lesions  HEENT: Head: Normocephalic, no lesions, without obvious abnormality.  Neck: no adenopathy, no carotid bruit, no JVD, supple, symmetrical, trachea midline, and thyroid not enlarged, symmetric, no tenderness/mass/nodules  Lungs: clear to auscultation bilaterally  Heart: regular rate and rhythm, S1, S2 normal, no murmur, click, rub or gallop  Abdomen: soft, non-tender; bowel sounds normal; no masses,  no organomegaly  Extremities:  Evidence of recent right femur surgical repair.  No bleed.  Minimal ecchymosis.  No drain.  Neurologic: Mental status: Awake, much more alert than yesterday.  Able to engage.  Able to ask and answer questions.  Moderate functional loss.  Please refer to physical Occupational Therapy team for details on her functional status and treatment plan.      *Right femoral neck fracture status post repair.  Patient is to follow-up with orthopedic team.     *Suspect vitamin D deficiency and osteoporosis.  Vitamin D level is pending.     *Parkinson features as reported by neurology.  He recommended not to start treatment at this time.  Please refer to Dr. Mcfarland's note for details.     *Functional impairment.  PT OT eval and treatment at the skilled unit.     *Mild blood loss anemia.  No evidence of acute blood loss at this time.  Stable hemoglobin.     *DVT prophylaxis.  Orthopedic team recommended Lovenox subcutaneous injection.     *Leukocytosis.  Rule out UTI.  UA came back negative.  Leukocytosis had resolved.     *Hypertension.  Resume home lisinopril.     *Hyperlipidemia, resume statin.    *Constipation, resolved.  Patient had a bowel movement yesterday.  Continue stimulants and laxatives.

## 2024-08-01 NOTE — PROGRESS NOTES
Physical Therapy  Facility/Department: [facility] MED SURG UNIT  Daily Treatment Note  NAME: Kristie Jason  : 1955  MRN: [id number]    Date of Service: 2024    Discharge Recommendations:  (P) Continue to assess pending progress, Home with assist PRN, Home with Home health PT, Outpatient PT        Patient Diagnosis(es): There were no encounter diagnoses.    Assessment   Assessment: (P) Pt. slow to move with limited tolerance due to R hip pain with movement. Pt. requires moderate to min assistance with first initial movement to edge of bed. Pt. able to follow withj requiring minimal assistance to stand x 2 trials. Good hand placement for push off to reach to sit this a.m. Fair static stand balance and fait to fait minus dynamic stand balance demo'd. Pt. ambulates 2 trials in room up to 15'. Pt. very limited to weight bear on R LE. Although, slight more tolerance this a.m. versus yesterday demo'd with mid stance. Assisted pt up in chair. 6 t0 7/10 R hip pain reported. CP post.  Activity Tolerance: (P) Other (comment);Patient limited by pain;Patient limited by endurance (limited by c/o dizziness.)     Plan    Physical Therapy Plan  General Plan: (P) 5-7 times per week (1-2)  Current Treatment Recommendations: (P) Strengthening;ROM;Balance training;Functional mobility training;Transfer training;Endurance training;Wheelchair mobility training;Gait training;Stair training;Neuromuscular re-education;Pain management;Home exercise program;Safety education & training;Patient/Caregiver education & training;Equipment evaluation, education, & procurement;Modalities;Positioning;Therapeutic activities     Restrictions  Restrictions/Precautions  Restrictions/Precautions: Weight Bearing  Implants present? : Metal implants (R hip hemiarthoplasty)  Lower Extremity Weight Bearing Restrictions  Right Lower Extremity Weight Bearing: Weight Bearing As Tolerated     Subjective    Subjective  Subjective: (P) Pt. up in bed reports soreness  Term Goal 1: Pt will demonstrate bed mobility indep  Long Term Goal 2: Pt will demonstrate transfers indep  Long Term Goal 3: Pt able to ambulate with AD for >125'x 1 with Mod indep for safe ambulation at home  Long Term Goal 4: Pt able to ambulate up/down 3 steps with HR on L and supervision for safe entry into her home  Long Term Goal 5: Pt indep with an advanced HEP to help to progress with pts strength  Patient Goals   Patient Goals : To be able to move better    Education       AM-PAC - Mobility              Therapy Time   Individual Concurrent Group Co-treatment   Time In  850         Time Out  935         Minutes  45                 Sagar Dunbar, PTA [id number]

## 2024-08-01 NOTE — PLAN OF CARE
Problem: Safety - Adult  Goal: Free from fall injury  Outcome: Progressing     Problem: Discharge Planning  Goal: Discharge to home or other facility with appropriate resources  Outcome: Progressing     Problem: ABCDS Injury Assessment  Goal: Absence of physical injury  Outcome: Progressing     Problem: Pain  Goal: Verbalizes/displays adequate comfort level or baseline comfort level  Outcome: Progressing     Problem: Musculoskeletal - Adult  Goal: Return mobility to safest level of function  Outcome: Progressing  Goal: Maintain proper alignment of affected body part  Outcome: Progressing  Goal: Return ADL status to a safe level of function  Outcome: Progressing     Problem: Nutrition Deficit:  Goal: Optimize nutritional status  8/1/2024 0209 by Nguyen Paige RN  Outcome: Progressing  7/31/2024 1301 by Tarsha Sequeira RD  Flowsheets (Taken 7/31/2024 1301)  Nutrient intake appropriate for improving, restoring, or maintaining nutritional needs:   Assess nutritional status and recommend course of action   Recommend appropriate diets, oral nutritional supplements, and vitamin/mineral supplements   Monitor oral intake, labs, and treatment plans

## 2024-08-01 NOTE — PROGRESS NOTES
Facility/Department: [facility] SUBACUTE UNIT  Daily Treatment Note  NAME: Kristie Jason  : 1955  MRN: [id number]    Date of Service: 2024    Discharge Recommendations:  Continue to assess pending progress, Home with Home health OT         Patient Diagnosis(es): There were no encounter diagnoses.     Assessment    Assessment: Pt is seated in chair and willing for OT intervention, agreeable to shower, once clothing was doffed and pt was ready to enter shower,  arrived and could not come back at a different time.  states he was paged to come visit the pt per the pt's request. OT assisted pt with donning clothes again and pt returned to chair for spiritual care visit. OT stood by and documented pt's information until spiritual consult was finished. Pt returned to bathroom and undressed again, then entered shower. Overall pt performed well and is making gains with OT POC. Pt in chair with fresh ice packs and all needs upon OT departure.  Activity Tolerance: Patient limited by endurance;Patient limited by pain  Discharge Recommendations: Continue to assess pending progress;Home with Home health OT      Plan   Occupational Therapy Plan  Times Per Week: 4-7  Times Per Day: Once a day  Current Treatment Recommendations: Strengthening;Balance training;Functional mobility training;Endurance training;Safety education & training;Patient/Caregiver education & training;Self-Care / ADL;Home management training;Equipment evaluation, education, & procurement;Positioning     Restrictions   WBAT RLE    Subjective   Subjective  Subjective: \"I'm willing to do what I need to do to get better.\"  Pain: 6/10 R hip  Orientation  Overall Orientation Status: Within Functional Limits  Orientation Level: Oriented X4  Pain: 6/10 R hip  Cognition  Overall Cognitive Status: WFL  Cognition Comment: Alert, improved mood today, pleasant        Objective    Bed Mobility Training  Bed Mobility Training: No  Balance  Sitting:  days  Long Term Goal 1: Doff/rylee UB/LB clothing Mod I  Long Term Goal 2: Bathe UB/LB Mod I  Long Term Goal 3: Complete toileting Mod I  Long Term Goal 4: Complete functional mobility and transfers Mod I  Long Term Goal 5: Demo BUE HEP I  Patient Goals   Patient goals : \"I want to go home.\"        Therapy Time   Individual Concurrent Group Co-treatment   Time In 1050         Time Out 1205         Minutes 75                 Radha Babb, [id number]

## 2024-08-01 NOTE — CONSULTS
Spiritual Health Assessment/Progress Note  [facility]    Spiritual/Emotional Needs,  ,  ,      Name: Kristie Jason MRN: [id number]    Age: 68 y.o.     Sex: female   Language: English   Jew: Non-Sikh   Closed displaced articular fracture of head of right femur with nonunion     Date: 8/1/2024            Total Time Calculated: 30 min              Spiritual Assessment continued in [facility] SURG UNIT        Referral/Consult From: Patient, Nurse   Encounter Overview/Reason: Spiritual/Emotional Needs  Service Provided For: Patient     visited patient for a spiritual care consult. Patient dealing with issues of isolation and powerlessness. Since being in the hospital the patient has not been able to attend the Bible study that helps her maintain a spiritual connection and a social connection with friends. Patient is connected with her , children and family. Patient also struggling with maintaining her strength to go through therapy.  encouraged patient to look toward her spiritual resources to find strength and provided prayer to patient.       Cynthia, Belief, Meaning:   Patient is connected with a cynthia tradition or spiritual practice  Family/Friends No family/friends present      Importance and Influence:  Patient has spiritual/personal beliefs that influence decisions regarding their health  Family/Friends no family/friends present    Community:  Patient feels well-supported. Support system includes: Spouse/Partner, Children, Cynthia Community, and Friends  Family/Friends Other: No family present    Assessment and Plan of Care:     Patient Interventions include: Facilitated expression of thoughts and feelings, Explored spiritual coping/struggle/distress and theological reflection, and Provided sacramental/Voodoo ritual  Family/Friends Interventions include: Other: No family present    Patient Plan of Care: Spiritual Care available upon further referral  Family/Friends Plan

## 2024-08-02 PROCEDURE — 97110 THERAPEUTIC EXERCISES: CPT

## 2024-08-02 PROCEDURE — 97116 GAIT TRAINING THERAPY: CPT

## 2024-08-02 PROCEDURE — 97535 SELF CARE MNGMENT TRAINING: CPT

## 2024-08-02 PROCEDURE — 6370000000 HC RX 637 (ALT 250 FOR IP): Performed by: INTERNAL MEDICINE

## 2024-08-02 PROCEDURE — 97530 THERAPEUTIC ACTIVITIES: CPT

## 2024-08-02 PROCEDURE — 6370000000 HC RX 637 (ALT 250 FOR IP): Performed by: PHYSICIAN ASSISTANT

## 2024-08-02 PROCEDURE — 1200000002 HC SEMI PRIVATE SWING BED

## 2024-08-02 PROCEDURE — 6360000002 HC RX W HCPCS: Performed by: PHYSICIAN ASSISTANT

## 2024-08-02 RX ADMIN — VENLAFAXINE HYDROCHLORIDE 75 MG: 37.5 CAPSULE, EXTENDED RELEASE ORAL at 09:39

## 2024-08-02 RX ADMIN — LISINOPRIL 5 MG: 5 TABLET ORAL at 09:39

## 2024-08-02 RX ADMIN — POLYETHYLENE GLYCOL 3350 17 G: 17 POWDER, FOR SOLUTION ORAL at 09:41

## 2024-08-02 RX ADMIN — ENOXAPARIN SODIUM 30 MG: 100 INJECTION SUBCUTANEOUS at 20:26

## 2024-08-02 RX ADMIN — SENNOSIDES AND DOCUSATE SODIUM 1 TABLET: 50; 8.6 TABLET ORAL at 20:26

## 2024-08-02 RX ADMIN — TRAMADOL HYDROCHLORIDE 50 MG: 50 TABLET, COATED ORAL at 09:39

## 2024-08-02 RX ADMIN — SENNOSIDES 8.6 MG: 8.6 TABLET, FILM COATED ORAL at 20:25

## 2024-08-02 RX ADMIN — SENNOSIDES AND DOCUSATE SODIUM 1 TABLET: 50; 8.6 TABLET ORAL at 09:40

## 2024-08-02 RX ADMIN — BUPROPION HYDROCHLORIDE 300 MG: 150 TABLET, EXTENDED RELEASE ORAL at 09:40

## 2024-08-02 RX ADMIN — SENNOSIDES 8.6 MG: 8.6 TABLET, FILM COATED ORAL at 09:39

## 2024-08-02 RX ADMIN — ACETAMINOPHEN 650 MG: 325 TABLET ORAL at 11:57

## 2024-08-02 RX ADMIN — AMITRIPTYLINE HYDROCHLORIDE 25 MG: 25 TABLET, FILM COATED ORAL at 20:25

## 2024-08-02 RX ADMIN — ENOXAPARIN SODIUM 30 MG: 100 INJECTION SUBCUTANEOUS at 09:41

## 2024-08-02 RX ADMIN — ATORVASTATIN CALCIUM 20 MG: 10 TABLET, FILM COATED ORAL at 09:39

## 2024-08-02 ASSESSMENT — PAIN DESCRIPTION - ORIENTATION
ORIENTATION: LEFT

## 2024-08-02 ASSESSMENT — PAIN SCALES - WONG BAKER
WONGBAKER_NUMERICALRESPONSE: HURTS A LITTLE BIT

## 2024-08-02 ASSESSMENT — PAIN DESCRIPTION - LOCATION
LOCATION: HIP
LOCATION: LEG
LOCATION: HIP;INCISION
LOCATION: HIP
LOCATION: HIP

## 2024-08-02 ASSESSMENT — PAIN - FUNCTIONAL ASSESSMENT
PAIN_FUNCTIONAL_ASSESSMENT: ACTIVITIES ARE NOT PREVENTED

## 2024-08-02 ASSESSMENT — PAIN SCALES - GENERAL
PAINLEVEL_OUTOF10: 7
PAINLEVEL_OUTOF10: 2
PAINLEVEL_OUTOF10: 7
PAINLEVEL_OUTOF10: 2
PAINLEVEL_OUTOF10: 5

## 2024-08-02 NOTE — PROGRESS NOTES
Occupational Therapy  Facility/Department: [facility] MED SURG UNIT  Daily Treatment Note  NAME: Kristie Jason  : 1955  MRN: [id number]    Date of Service: 2024    Discharge Recommendations:   Home with Home health OT        Treatment Diagnosis: Decreased ADL/IADL performance d/t fall, R femur fx with hemiarthroplasty       Patient Diagnosis(es): R femur fx-hemiarthroplasty    Assessment      OT follow up:yes  Pt. Was sitting on the toilet upon arrival. Pt. Was agreeable to a sponge bath at the sink with OT. Pt. Required Min A for cleanliness of backside after BM. Pt.'s ADL status is listed below for sponge bath. Trained and educated pt. In dressing techniques. Pt. Kept trying to dress the wrong leg first. Pt. C/o of dizziness while ambulating to the chair so instead she went to the bed. Pt. Required Mod A for bed mobility and lifting her legs in the bed. Pt. C/o of 4/10 pain. At the end of OT session FRANCO made and retrieved pt. Ice packs for hip. Answered pt.'s questions and concerns.     Plan       Occupational Therapy Plan  Times Per Week: 4-7  Times Per Day: Once a day  Current Treatment Recommendations: Strengthening, Balance training, Functional mobility training, Endurance training, Safety education & training, Patient/Caregiver education & training, Self-Care / ADL, Home management training, Equipment evaluation, education, & procurement, Positioning      Restrictions     Restrictions/Precautions  Restrictions/Precautions: Weight Bearing  Implants present? : Metal implants (R hip hemiarthoplasty)  Lower Extremity Weight Bearing Restrictions  Right Lower Extremity Weight Bearing: Weight Bearing As Tolerated  Subjective       Pt. Was agreeable to a sponge bath with OT.        Objective         Transfer Training  Sit to Stand: Minimum assistance 3 x from different surfaces  Stand to Sit: Minimum assistance 3 x to different surfaces  Pt. Tolerated standing for 1 minute 2 x and 3 minutes 1 x with ww at [facility]   Set

## 2024-08-02 NOTE — PROGRESS NOTES
Physical Therapy  Facility/Department: [facility] MED SURG UNIT  Daily Treatment Note  NAME: Kristie Jason  : 1955  MRN: [id number]    Date of Service: 2024    Discharge Recommendations:  (P) Continue to assess pending progress, Home with assist PRN, Home with Home health PT        Patient Diagnosis(es): There were no encounter diagnoses.    Assessment   Assessment: (P) Pt. demo's slight progress with participation; however, limited by increase pain with c/o R hip pain 10/10 with attempting to increase weight bear with stand ther ex. Slight improve R LE weight bear tolerance with gait pattern. Pt. still limited with R LE demonstrating step to gait pattern with occasional unsteadiness with narrow base of support. Slight progress with tsf skills from minimal to contact guard assistance. Assisted pt to recliner post with nurse arriving for pain management care.  Activity Tolerance: Patient limited by pain;Patient limited by endurance     Plan    Physical Therapy Plan  General Plan: 5-7 times per week (1-2)  Current Treatment Recommendations: Strengthening;ROM;Balance training;Functional mobility training;Transfer training;Endurance training;Wheelchair mobility training;Gait training;Stair training;Neuromuscular re-education;Pain management;Home exercise program;Safety education & training;Patient/Caregiver education & training;Equipment evaluation, education, & procurement;Modalities;Positioning;Therapeutic activities     Restrictions  Restrictions/Precautions  Restrictions/Precautions: Weight Bearing  Implants present? : Metal implants (R hip hemiarthoplasty)  Lower Extremity Weight Bearing Restrictions  Right Lower Extremity Weight Bearing: Weight Bearing As Tolerated     Subjective    Subjective  Subjective: My R hip is really sore /10.     Objective   Vitals     Balance  Sitting: Intact  Sitting - Static: Good (unsupported)  Sitting - Dynamic: Occasional  Standing: Impaired  Standing - Static: Fair  Standing -

## 2024-08-02 NOTE — PROGRESS NOTES
Pt in bathroom saying she felt she might pass out. Vitals taken 102/62 97 pulse 109 pulse. Will continue to monitor

## 2024-08-02 NOTE — PROGRESS NOTES
Psychosocial Assessment     Physical Appearance: Average    Dress: Neat    Hygiene: Good    Speech: Coherent    Affect/Mood: Appropriate, Calm, and Cooperative    Alert and Orientated: Person and Place and situation    Thought Process: Relevant    Behavior: Cooperative    Resides:Patient reports residing at home with spouse     DME: Rolator , Shower Chair, and Grab Bars    Support System: spouse / significant other    History of Mental Health: Patient reports having major depressive DC, but reports mood is managed with medication     Suicidal/ Homicidal:  No    Food:Patient reports that she or spouse grocery shop and that spouse generally prepares meals.  Patient reports being able to afford food    Medication: [facility] mail in Pharmacy or [facility] in [place] for short term medication     Transportation:Yes      Help at home needs: Patient identifies spouse as supportive and able to assist with patient's care or household chores as needed    DC Plan: Patient plans to return home with spouse and have [facility] follow upon DC.        Plan for transition of care is related to the following treatment goals: \" Be able to walk and get up and down stairs\"     Hobbies or Interests: \" I watch movies and read my bible\"    Where Hobbies or Interests offered to patient at [facility] SAC unit: Patient reports plan to have spouse bring in patient's Bible to read.  Patient also reports finding alhaji in watching TV at [facility]     The patient was provided with choice of provider, and agrees with the discharge plan: on-going     Freedom of choice provided: Yes    The patient was provided with choice of all post acute providers and agrees with the discharge plan: on-going     Electronically signed by SANJUANITA Martinez on 8/2/2024 at 5:54 PM

## 2024-08-03 PROCEDURE — 97116 GAIT TRAINING THERAPY: CPT

## 2024-08-03 PROCEDURE — 1200000002 HC SEMI PRIVATE SWING BED

## 2024-08-03 PROCEDURE — 6360000002 HC RX W HCPCS: Performed by: PHYSICIAN ASSISTANT

## 2024-08-03 PROCEDURE — 97535 SELF CARE MNGMENT TRAINING: CPT

## 2024-08-03 PROCEDURE — 6370000000 HC RX 637 (ALT 250 FOR IP): Performed by: PHYSICIAN ASSISTANT

## 2024-08-03 PROCEDURE — 97530 THERAPEUTIC ACTIVITIES: CPT

## 2024-08-03 PROCEDURE — 6370000000 HC RX 637 (ALT 250 FOR IP): Performed by: INTERNAL MEDICINE

## 2024-08-03 PROCEDURE — 97110 THERAPEUTIC EXERCISES: CPT

## 2024-08-03 RX ADMIN — AMITRIPTYLINE HYDROCHLORIDE 25 MG: 25 TABLET, FILM COATED ORAL at 21:10

## 2024-08-03 RX ADMIN — VENLAFAXINE HYDROCHLORIDE 75 MG: 37.5 CAPSULE, EXTENDED RELEASE ORAL at 08:36

## 2024-08-03 RX ADMIN — SENNOSIDES AND DOCUSATE SODIUM 1 TABLET: 50; 8.6 TABLET ORAL at 08:37

## 2024-08-03 RX ADMIN — SENNOSIDES 8.6 MG: 8.6 TABLET, FILM COATED ORAL at 21:10

## 2024-08-03 RX ADMIN — LISINOPRIL 5 MG: 5 TABLET ORAL at 08:37

## 2024-08-03 RX ADMIN — BUPROPION HYDROCHLORIDE 300 MG: 150 TABLET, EXTENDED RELEASE ORAL at 08:36

## 2024-08-03 RX ADMIN — ATORVASTATIN CALCIUM 20 MG: 10 TABLET, FILM COATED ORAL at 08:37

## 2024-08-03 RX ADMIN — TRAMADOL HYDROCHLORIDE 100 MG: 50 TABLET, COATED ORAL at 03:38

## 2024-08-03 RX ADMIN — ENOXAPARIN SODIUM 30 MG: 100 INJECTION SUBCUTANEOUS at 08:36

## 2024-08-03 RX ADMIN — ENOXAPARIN SODIUM 30 MG: 100 INJECTION SUBCUTANEOUS at 21:09

## 2024-08-03 RX ADMIN — SENNOSIDES AND DOCUSATE SODIUM 1 TABLET: 50; 8.6 TABLET ORAL at 21:10

## 2024-08-03 RX ADMIN — POLYETHYLENE GLYCOL 3350 17 G: 17 POWDER, FOR SOLUTION ORAL at 08:36

## 2024-08-03 RX ADMIN — SENNOSIDES 8.6 MG: 8.6 TABLET, FILM COATED ORAL at 08:36

## 2024-08-03 ASSESSMENT — PAIN - FUNCTIONAL ASSESSMENT
PAIN_FUNCTIONAL_ASSESSMENT: PREVENTS OR INTERFERES SOME ACTIVE ACTIVITIES AND ADLS
PAIN_FUNCTIONAL_ASSESSMENT: ACTIVITIES ARE NOT PREVENTED

## 2024-08-03 ASSESSMENT — PAIN DESCRIPTION - FREQUENCY: FREQUENCY: CONTINUOUS

## 2024-08-03 ASSESSMENT — PAIN SCALES - GENERAL
PAINLEVEL_OUTOF10: 8
PAINLEVEL_OUTOF10: 3
PAINLEVEL_OUTOF10: 8

## 2024-08-03 ASSESSMENT — PAIN DESCRIPTION - PAIN TYPE: TYPE: SURGICAL PAIN

## 2024-08-03 ASSESSMENT — PAIN DESCRIPTION - LOCATION
LOCATION: HIP
LOCATION: LEG;HIP

## 2024-08-03 ASSESSMENT — PAIN SCALES - WONG BAKER
WONGBAKER_NUMERICALRESPONSE: HURTS A LITTLE BIT

## 2024-08-03 ASSESSMENT — PAIN DESCRIPTION - ONSET: ONSET: ON-GOING

## 2024-08-03 ASSESSMENT — PAIN DESCRIPTION - DESCRIPTORS
DESCRIPTORS: ACHING
DESCRIPTORS: ACHING;SORE

## 2024-08-03 ASSESSMENT — PAIN DESCRIPTION - ORIENTATION
ORIENTATION: RIGHT
ORIENTATION: RIGHT

## 2024-08-03 NOTE — PLAN OF CARE
Problem: Safety - Adult  Goal: Free from fall injury  Outcome: Progressing     Problem: Discharge Planning  Goal: Discharge to home or other facility with appropriate resources  Outcome: Progressing     Problem: ABCDS Injury Assessment  Goal: Absence of physical injury  Outcome: Progressing     Problem: Pain  Goal: Verbalizes/displays adequate comfort level or baseline comfort level  Outcome: Progressing     Problem: Musculoskeletal - Adult  Goal: Return mobility to safest level of function  Outcome: Progressing  Goal: Maintain proper alignment of affected body part  Outcome: Progressing  Goal: Return ADL status to a safe level of function  Outcome: Progressing     Problem: Nutrition Deficit:  Goal: Optimize nutritional status  Outcome: Progressing

## 2024-08-03 NOTE — PROGRESS NOTES
Occupational Therapy  Facility/Department: [facility] MED SURG UNIT  Daily Treatment Note  NAME: Kristie Jasno  : 1955  MRN: [id number]    Date of Service: 8/3/2024    Discharge Recommendations:   Home with Home health OT, lives with          Patient Diagnosis(es): R femur fx-hemiarthroplasty     Assessment      OT follow up:yes  Pt. Stated she needed to go to the bathroom. Pt. Demonstrated bed mobility, standing tolerances, toileting, and washing her face for this OT session. Pt. Was offered a sponge bath or a shower, but declined both. Pt. Stated she will wash up or take a shower later when her  brings her clean clothes. Pt. Stated she has no more shirts and all her pants are dirty. Educated pt. On doing it for her OT now while OT has time, but she still declined. Pt. Stated she will take a shower on Monday with OT. Pt. Stated she has pain in her leg and back. Pt. Reported she has no dizziness today. Pt. Appeared confused this day during her sentences she would stop and lose her train of thought. Pt. Repeated herself through out OT session. Pt. Contradicted herself through out OT session. Answered pt.'s questions and concerns.     Plan   Occupational Therapy Plan  Times Per Week: 4-7  Times Per Day: Once a day  Therapy Duration:  (acute LOS)  Current Treatment Recommendations: Strengthening;Balance training;Functional mobility training;Endurance training;Safety education & training;Patient/Caregiver education & training;Self-Care / ADL;Home management training;Equipment evaluation, education, & procurement;Positioning     Restrictions   Restrictions/Precautions  Restrictions/Precautions: Weight Bearing  Implants present? : Metal implants (R hip hemiarthoplasty)  Lower Extremity Weight Bearing Restrictions  Right Lower Extremity Weight Bearing: Weight Bearing As Tolerated    Subjective       Pt. Stated she needed to go to the bathroom.        Objective    Bed mobility- getting to EOB allowing extra time

## 2024-08-03 NOTE — PROGRESS NOTES
Patient alert and oriented, Up to the bathroom with assist. Pt able to change pants with assist. Pt working with therapy at this time.

## 2024-08-03 NOTE — PROGRESS NOTES
Pt aquacell leaking and coming off. Taken off and island dressing placed over incision. Pt tolerated well. Will continue to monitor for drainage.

## 2024-08-03 NOTE — PROGRESS NOTES
Physical Therapy  Facility/Department: [facility] MED SURG UNIT  Daily Treatment Note  NAME: Kristie Jason  : 1955  MRN: [id number]    Date of Service: 8/3/2024    Discharge Recommendations:  Continue to assess pending progress, Home with assist PRN, Home with Home health PT        Patient Diagnosis(es): There were no encounter diagnoses.    Assessment   Assessment: Patient able to walk slightly increased distance this session ; good technique with turning and keeping walker close.  Activity Tolerance: Patient limited by endurance     Plan    Physical Therapy Plan  General Plan: 5-7 times per week  Current Treatment Recommendations: Strengthening;ROM;Balance training;Functional mobility training;Transfer training;Endurance training;Wheelchair mobility training;Gait training;Stair training;Neuromuscular re-education;Pain management;Home exercise program;Safety education & training;Patient/Caregiver education & training;Equipment evaluation, education, & procurement;Modalities;Positioning;Therapeutic activities     Restrictions  Restrictions/Precautions  Restrictions/Precautions: Weight Bearing  Implants present? : Metal implants (R hip hemiarthoplasty)  Lower Extremity Weight Bearing Restrictions  Right Lower Extremity Weight Bearing: Weight Bearing As Tolerated     Subjective    Subjective  Subjective: Patient reports that her broken hip is really bothering her. Patient happy to see me.     Objective   Vitals     Bed Mobility Training  Bed Mobility Training: No  Balance  Sitting: Intact  Sitting - Static: Good (unsupported)  Sitting - Dynamic: Occasional  Standing: Impaired  Standing - Static: Fair  Standing - Dynamic: Fair  Transfer Training  Transfer Training: Yes  Overall Level of Assistance: Minimum assistance;Contact-guard assistance  Interventions: Safety awareness training;Verbal cues  Sit to Stand: Minimum assistance;Contact-guard assistance  Stand to Sit: Minimum assistance;Contact-guard assistance  Stand

## 2024-08-04 PROCEDURE — 97535 SELF CARE MNGMENT TRAINING: CPT

## 2024-08-04 PROCEDURE — 1200000002 HC SEMI PRIVATE SWING BED

## 2024-08-04 PROCEDURE — 97530 THERAPEUTIC ACTIVITIES: CPT

## 2024-08-04 PROCEDURE — 97110 THERAPEUTIC EXERCISES: CPT

## 2024-08-04 PROCEDURE — 6360000002 HC RX W HCPCS: Performed by: PHYSICIAN ASSISTANT

## 2024-08-04 PROCEDURE — 6370000000 HC RX 637 (ALT 250 FOR IP): Performed by: PHYSICIAN ASSISTANT

## 2024-08-04 PROCEDURE — 97116 GAIT TRAINING THERAPY: CPT

## 2024-08-04 PROCEDURE — 6370000000 HC RX 637 (ALT 250 FOR IP): Performed by: INTERNAL MEDICINE

## 2024-08-04 RX ADMIN — AMITRIPTYLINE HYDROCHLORIDE 25 MG: 25 TABLET, FILM COATED ORAL at 20:28

## 2024-08-04 RX ADMIN — LISINOPRIL 5 MG: 5 TABLET ORAL at 08:45

## 2024-08-04 RX ADMIN — ENOXAPARIN SODIUM 30 MG: 100 INJECTION SUBCUTANEOUS at 08:45

## 2024-08-04 RX ADMIN — VENLAFAXINE HYDROCHLORIDE 75 MG: 37.5 CAPSULE, EXTENDED RELEASE ORAL at 08:45

## 2024-08-04 RX ADMIN — ENOXAPARIN SODIUM 30 MG: 100 INJECTION SUBCUTANEOUS at 20:27

## 2024-08-04 RX ADMIN — ATORVASTATIN CALCIUM 20 MG: 10 TABLET, FILM COATED ORAL at 08:46

## 2024-08-04 RX ADMIN — BUPROPION HYDROCHLORIDE 300 MG: 150 TABLET, EXTENDED RELEASE ORAL at 08:45

## 2024-08-04 RX ADMIN — TRAMADOL HYDROCHLORIDE 100 MG: 50 TABLET, COATED ORAL at 20:27

## 2024-08-04 ASSESSMENT — PAIN SCALES - GENERAL
PAINLEVEL_OUTOF10: 2
PAINLEVEL_OUTOF10: 7
PAINLEVEL_OUTOF10: 4

## 2024-08-04 ASSESSMENT — PAIN DESCRIPTION - ORIENTATION
ORIENTATION: RIGHT
ORIENTATION: LEFT

## 2024-08-04 ASSESSMENT — PAIN DESCRIPTION - DESCRIPTORS
DESCRIPTORS: ACHING;SORE
DESCRIPTORS: SHARP

## 2024-08-04 ASSESSMENT — PAIN DESCRIPTION - LOCATION
LOCATION: BACK;HIP
LOCATION: HIP

## 2024-08-04 ASSESSMENT — PAIN - FUNCTIONAL ASSESSMENT: PAIN_FUNCTIONAL_ASSESSMENT: ACTIVITIES ARE NOT PREVENTED

## 2024-08-04 ASSESSMENT — PAIN SCALES - WONG BAKER: WONGBAKER_NUMERICALRESPONSE: HURTS A LITTLE BIT

## 2024-08-04 NOTE — PROGRESS NOTES
Physical Therapy  Facility/Department: [facility] MED SURG UNIT  Daily Treatment Note  NAME: Kristie Jason  : 1955  MRN: [id number]    Date of Service: 2024    Discharge Recommendations:  Continue to assess pending progress, Home with assist PRN, Home with Home health PT        Patient Diagnosis(es): There were no encounter diagnoses.    Assessment   Assessment: Patient able to improve gait as distance improved ; tends to shorten step although minimized by the end of walk. More motivated today.  Activity Tolerance: Patient limited by endurance     Plan    Physical Therapy Plan  General Plan: 5-7 times per week  Current Treatment Recommendations: Strengthening;ROM;Balance training;Functional mobility training;Transfer training;Endurance training;Wheelchair mobility training;Gait training;Stair training;Neuromuscular re-education;Pain management;Home exercise program;Safety education & training;Patient/Caregiver education & training;Equipment evaluation, education, & procurement;Modalities;Positioning;Therapeutic activities     Restrictions  Restrictions/Precautions  Restrictions/Precautions: Weight Bearing  Implants present? : Metal implants (R hip hemiarthoplasty)  Lower Extremity Weight Bearing Restrictions  Right Lower Extremity Weight Bearing: Weight Bearing As Tolerated     Subjective    Subjective  Subjective: Patient reports that her hip and leg is up and down; a little more painful today.     Objective   Vitals     Bed Mobility Training  Bed Mobility Training: No  Balance  Sitting: Intact  Sitting - Static: Good (unsupported)  Sitting - Dynamic: Occasional  Standing: Impaired  Standing - Static: Fair  Standing - Dynamic: Fair  Transfer Training  Transfer Training: Yes  Overall Level of Assistance: Minimum assistance;Contact-guard assistance  Interventions: Safety awareness training;Verbal cues  Sit to Stand: Minimum assistance;Contact-guard assistance  Stand to Sit: Minimum assistance;Contact-guard

## 2024-08-04 NOTE — PROGRESS NOTES
Facility/Department: [facility] MED SURG  UNIT  Daily Treatment Note  NAME: Kristie Jason  : 1955  MRN: [id number]    Date of Service: 2024    Discharge Recommendations:  Continue to assess pending progress, Home with Home health OT  OT Equipment Recommendations  Other: continue to assess      Patient Diagnosis(es): There were no encounter diagnoses.     Assessment    Assessment: Pt is seated in chair and willing for OT intervention, agreeable to shower. Pt demonstrated improved skill level with functonal mobility and dressing/bathing skills to CGA/MIN A this date. Pt required mod cues for technique, positioning nad encouragement with improved participation observed. Increased time needed with shampooing hair and standing to complete micih hygiene in shower. EC/WS training with good application. Overall pt performed well and is making gains with OT POC. Pt in chair with all needs met upon departure and  present.  Activity Tolerance: Patient limited by endurance  Discharge Recommendations: Continue to assess pending progress;Home with Home health OT  Other: continue to assess      Plan   Occupational Therapy Plan  Times Per Week: 4-7  Times Per Day: Once a day  Current Treatment Recommendations: Strengthening;Balance training;Functional mobility training;Endurance training;Safety education & training;Patient/Caregiver education & training;Self-Care / ADL;Home management training;Equipment evaluation, education, & procurement;Positioning     Restrictions  R HIP hemirtroplasty        Subjective   Subjective  Subjective: \"I was hoping to take a shower.\" Pt motivated to participate in OT Treatment  Pain: Pt did not mention pain  Orientation  Overall Orientation Status: Within Functional Limits  Orientation Level: Oriented X4  Cognition  Overall Cognitive Status: WFL  Cognition Comment: Alert, improved mood today, pleasant        Objective    Vitals     Bed Mobility Training  Bed Mobility Training:  No  Balance  Sitting: Intact  Sitting - Static: Good (unsupported)  Sitting - Dynamic: Occasional  Standing: Impaired  Standing - Static: Fair  Standing - Dynamic: Fair  Transfer Training  Transfer Training: Yes  Overall Level of Assistance: Minimum assistance;Contact-guard assistance  Interventions: Safety awareness training;Verbal cues  Sit to Stand: Minimum assistance;Contact-guard assistance  Stand to Sit: Minimum assistance;Contact-guard assistance  Stand Pivot Transfers: Minimum assistance     ADL  Grooming: Stand by assistance  Grooming Skilled Clinical Factors: to wash/rinse hair in seated position  UE Bathing: Stand by assistance  LE Bathing: Minimal assistance  LE Bathing Skilled Clinical Factors: pt able to bathe to ankle  UE Dressing: Stand by assistance  LE Dressing: Minimal assistance  LE Dressing Skilled Clinical Factors: don underwear, shorts wth CGA and MIN A to don socks  Toileting: Contact guard assistance  Functional Mobility: Contact guard assistance  Functional Mobility Skilled Clinical Factors: FWW used  Additional Comments: Full shower with OT today  Skin Care: Bath wipes              Patient Education  Education Given To: Patient  Education Provided: Role of Therapy;Plan of Care;Precautions;Equipment;ADL Adaptive Strategies;Transfer Training;Fall Prevention Strategies;Energy Conservation;Mobility Training  Education Method: Demonstration;Verbal;Teach Back  Barriers to Learning: None  Education Outcome: Verbalized understanding;Demonstrated understanding;Continued education needed    Goals  Short Term Goals  Time Frame for Short Term Goals: 5-7 days  Short Term Goal 1: Doff/rylee UB/LB clothing CGA  Short Term Goal 2: Bathe UB/LB CGA  Short Term Goal 3: Complete toileting CGA  Short Term Goal 4: Complete functional mobility and transfers CGA  Short Term Goal 5: Demo BUE HEP with min vc  Long Term Goals  Time Frame for Long Term Goals : 10-14 days  Long Term Goal 1: Doff/rylee UB/LB clothing Mod

## 2024-08-04 NOTE — PROGRESS NOTES
Hospitalist Progress Note      PCP: Flor Calderón MD    Date of Admission: 7/30/2024    Chief Complaint: weakness/pain    Subjective: patient awake/alert     Medications:  Reviewed    Infusion Medications   Scheduled Medications    lisinopril  5 mg Oral Daily    senna  1 tablet Oral BID    amitriptyline  25 mg Oral Nightly    atorvastatin  20 mg Oral Daily    buPROPion  300 mg Oral QAM    enoxaparin  30 mg SubCUTAneous BID    polyethylene glycol  17 g Oral Daily    sennosides-docusate sodium  1 tablet Oral BID     PRN Meds: acetaminophen, bisacodyl, ondansetron **OR** ondansetron, traMADol **OR** traMADol    No intake or output data in the 24 hours ending 08/04/24 1033    Exam:    /68   Pulse 87   Temp 98.8 °F (37.1 °C) (Oral)   Resp 18   Ht 1.67 m (5' 5.75\")   Wt 64.9 kg (143 lb)   SpO2 98%   BMI 23.26 kg/m²     General appearance: No apparent distress, appears stated age and cooperative.  HEENT: Pupils equal, round, and reactive to light. Conjunctivae/corneas clear.  Neck: Supple, with full range of motion. No jugular venous distention. Trachea midline.  Respiratory:  Normal respiratory effort. Clear to auscultation, bilaterally without Rales/Wheezes/Rhonchi.  Cardiovascular: Regular rate and rhythm with normal S1/S2 without murmurs, rubs or gallops.  Abdomen: Soft, non-tender, non-distended with normal bowel sounds.  Musculoskeletal: No clubbing, cyanosis or edema bilaterally.  Full range of motion without deformity.  Skin: Skin color, texture, turgor normal.  No rashes or lesions.  Neurologic:  Neurovascularly intact without any focal sensory/motor deficits. Cranial nerves: II-XII intact, grossly non-focal.  Psychiatric: Alert and oriented, thought content appropriate, normal insight  Capillary Refill: Brisk,< 3 seconds   Peripheral Pulses: +2 palpable, equal bilaterally       Labs:   No results for input(s): \"WBC\", \"HGB\", \"HCT\", \"PLT\" in the last 72 hours.  No results for input(s): \"NA\", \"K\",

## 2024-08-05 LAB
ANION GAP SERPL CALCULATED.3IONS-SCNC: 9 MEQ/L (ref 9–15)
BASOPHILS # BLD: 0 K/UL (ref 0–0.1)
BASOPHILS NFR BLD: 0.6 % (ref 0.1–1.2)
BUN SERPL-MCNC: 21 MG/DL (ref 8–23)
CALCIUM SERPL-MCNC: 8.4 MG/DL (ref 8.5–9.9)
CHLORIDE SERPL-SCNC: 104 MEQ/L (ref 95–107)
CO2 SERPL-SCNC: 25 MEQ/L (ref 20–31)
CREAT SERPL-MCNC: 0.55 MG/DL (ref 0.5–0.9)
EOSINOPHIL # BLD: 0 K/UL (ref 0–0.4)
EOSINOPHIL NFR BLD: 2.4 % (ref 0.7–5.8)
ERYTHROCYTE [DISTWIDTH] IN BLOOD BY AUTOMATED COUNT: 13.3 % (ref 11.7–14.4)
GLUCOSE SERPL-MCNC: 95 MG/DL (ref 70–99)
HCT VFR BLD AUTO: 28.8 % (ref 37–47)
HGB BLD-MCNC: 9.4 G/DL (ref 11.2–15.7)
IMM GRANULOCYTES # BLD: 0.1 K/UL
IMM GRANULOCYTES NFR BLD: 2.1 %
LYMPHOCYTES # BLD: 1.1 K/UL (ref 1.2–3.7)
LYMPHOCYTES NFR BLD: 17 %
MCH RBC QN AUTO: 30.3 PG (ref 25.6–32.2)
MCHC RBC AUTO-ENTMCNC: 32.6 % (ref 32.2–35.5)
MCV RBC AUTO: 92.9 FL (ref 79.4–94.8)
MONOCYTES # BLD: 1.7 K/UL (ref 0.2–0.9)
MONOCYTES NFR BLD: 26 % (ref 4.7–12.5)
NEUTROPHILS # BLD: 3.8 K/UL (ref 1.6–6.1)
NEUTS BAND NFR BLD MANUAL: 3 % (ref 5–11)
NEUTS SEG NFR BLD: 54 % (ref 34–71.1)
PLATELET # BLD AUTO: 347 K/UL (ref 182–369)
PLATELET BLD QL SMEAR: ADEQUATE
POTASSIUM SERPL-SCNC: 4.2 MEQ/L (ref 3.4–4.9)
RBC # BLD AUTO: 3.1 M/UL (ref 3.93–5.22)
SODIUM SERPL-SCNC: 138 MEQ/L (ref 135–144)
WBC # BLD AUTO: 6.6 K/UL (ref 4–10)

## 2024-08-05 PROCEDURE — 6360000002 HC RX W HCPCS: Performed by: PHYSICIAN ASSISTANT

## 2024-08-05 PROCEDURE — 97530 THERAPEUTIC ACTIVITIES: CPT

## 2024-08-05 PROCEDURE — 97110 THERAPEUTIC EXERCISES: CPT

## 2024-08-05 PROCEDURE — 85025 COMPLETE CBC W/AUTO DIFF WBC: CPT

## 2024-08-05 PROCEDURE — 80048 BASIC METABOLIC PNL TOTAL CA: CPT

## 2024-08-05 PROCEDURE — 97535 SELF CARE MNGMENT TRAINING: CPT

## 2024-08-05 PROCEDURE — 6370000000 HC RX 637 (ALT 250 FOR IP): Performed by: INTERNAL MEDICINE

## 2024-08-05 PROCEDURE — 97116 GAIT TRAINING THERAPY: CPT

## 2024-08-05 PROCEDURE — 36415 COLL VENOUS BLD VENIPUNCTURE: CPT

## 2024-08-05 PROCEDURE — 1200000002 HC SEMI PRIVATE SWING BED

## 2024-08-05 PROCEDURE — 6370000000 HC RX 637 (ALT 250 FOR IP): Performed by: PHYSICIAN ASSISTANT

## 2024-08-05 RX ADMIN — SENNOSIDES 8.6 MG: 8.6 TABLET, FILM COATED ORAL at 19:39

## 2024-08-05 RX ADMIN — ENOXAPARIN SODIUM 30 MG: 100 INJECTION SUBCUTANEOUS at 08:07

## 2024-08-05 RX ADMIN — BUPROPION HYDROCHLORIDE 300 MG: 150 TABLET, EXTENDED RELEASE ORAL at 08:08

## 2024-08-05 RX ADMIN — SENNOSIDES AND DOCUSATE SODIUM 1 TABLET: 50; 8.6 TABLET ORAL at 19:39

## 2024-08-05 RX ADMIN — ATORVASTATIN CALCIUM 20 MG: 10 TABLET, FILM COATED ORAL at 08:07

## 2024-08-05 RX ADMIN — AMITRIPTYLINE HYDROCHLORIDE 25 MG: 25 TABLET, FILM COATED ORAL at 19:39

## 2024-08-05 RX ADMIN — TRAMADOL HYDROCHLORIDE 100 MG: 50 TABLET, COATED ORAL at 23:57

## 2024-08-05 RX ADMIN — ENOXAPARIN SODIUM 30 MG: 100 INJECTION SUBCUTANEOUS at 19:39

## 2024-08-05 RX ADMIN — LISINOPRIL 5 MG: 5 TABLET ORAL at 08:08

## 2024-08-05 RX ADMIN — TRAMADOL HYDROCHLORIDE 100 MG: 50 TABLET, COATED ORAL at 06:59

## 2024-08-05 ASSESSMENT — PAIN DESCRIPTION - ORIENTATION
ORIENTATION: RIGHT
ORIENTATION: LEFT

## 2024-08-05 ASSESSMENT — PAIN SCALES - GENERAL
PAINLEVEL_OUTOF10: 8
PAINLEVEL_OUTOF10: 7
PAINLEVEL_OUTOF10: 0

## 2024-08-05 ASSESSMENT — PAIN DESCRIPTION - LOCATION
LOCATION: HIP
LOCATION: HIP;LEG

## 2024-08-05 ASSESSMENT — PAIN DESCRIPTION - DESCRIPTORS
DESCRIPTORS: THROBBING
DESCRIPTORS: ACHING

## 2024-08-05 NOTE — PROGRESS NOTES
Comprehensive Nutrition Assessment    Type and Reason for Visit:  Reassess    Nutrition Recommendations/Plan:   Continue regular diet as ordered  PO >75% Meals  Continue supplements as ordered: standard high calorie high protein TID, frozen supplement TID  Monitor weight     Malnutrition Assessment:  Malnutrition Status:  No malnutrition (07/31/24 1255)    Context:  Acute Illness     Findings of the 6 clinical characteristics of malnutrition:  Energy Intake:  Mild decrease in energy intake (Comment)  Weight Loss:  No significant weight loss     Body Fat Loss:  No significant body fat loss     Muscle Mass Loss:  No significant muscle mass loss    Fluid Accumulation:  No significant fluid accumulation     Strength:  Not Performed    Nutrition Assessment:    Nutritional status appears to be improving. Pt reports appetite is better, and is accepting supplements as ordered. No S/s of malnutrition noted. Pt out of bed at time of assessment. Will request new weight. continue current nutritional plan of care.    Nutrition Related Findings:    Admit to subacute rehab related to right femur fracture. \"The patient is a 68 y.o. female with PMH of hypertension and depression. Patient tripped on her cat. She presented with right hip pain and found to have fracture. She underwent repair and the subsequently recommended to be admitted to skilled unit for inpatient physical and Occupational Therapy. Patient is able to provide basic information. Unable to engage in complex conversation. \" Admit weight 143#, within UBWR per EMR weight history. Diet is disphagia soft/bite size. ONS in place all meals, (standard high calorie/high protein and frozen ONS). PO 51-75% meals. Meds reviewed, surgical incision to right femur. Labs reviewed 8/5- no nutrition related concerns. Last BM per EMR 8/4, Wound Type: Surgical Incision (right hip)       Current Nutrition Intake & Therapies:    Average Meal Intake: 51-75%  Average Supplements Intake:
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
## 2024-08-05 NOTE — PROGRESS NOTES
Facility/Department: [facility] SUBACUTE UNIT  Daily Treatment Note  NAME: Kristie Jason  : 1955  MRN: [id number]    Date of Service: 2024    Discharge Recommendations:  Continue to assess pending progress, Home with Home health OT         Patient Diagnosis(es): There were no encounter diagnoses.     Assessment    Assessment: Pt presents in chair and reports she is not having the best day, states she did not sleep well last night and her hip is hurting more than usual today. Pt was agreeable to take a sponge bath and change her clothes, transitioned to bathroom for toileting and grooming. Pt needs extra time for tasks d/t slow movements and pain. Pt in chair with all needs within reach upon OT departure.  Activity Tolerance: Patient limited by pain;Patient limited by fatigue  Discharge Recommendations: Continue to assess pending progress;Home with Home health OT      Plan   Occupational Therapy Plan  Times Per Week: 4-7  Times Per Day: Once a day  Current Treatment Recommendations: Strengthening;Balance training;Functional mobility training;Endurance training;Safety education & training;Patient/Caregiver education & training;Self-Care / ADL;Home management training;Equipment evaluation, education, & procurement;Positioning     Restrictions   LLE WBAT    Subjective   Subjective  Subjective: \"I'm sore and swollen today.\"  Pain: LLE 5/10  Orientation  Overall Orientation Status: Within Functional Limits  Orientation Level: Oriented X4  Pain: LLE 5/10  Cognition  Overall Cognitive Status: WFL  Cognition Comment: Alert, cooperative, pleasant        Objective    Bed Mobility Training  Bed Mobility Training: No  Overall Level of Assistance: Stand-by assistance;Additional time (HOB elevated, use of bed rails)  Interventions: Verbal cues;Safety awareness training;Tactile cues  Rolling: Modified independent  Supine to Sit: Stand-by assistance;Additional time  Balance  Sitting: Intact  Sitting - Static: Good

## 2024-08-05 NOTE — PROGRESS NOTES
Chart reviewed.  Patient continues to receive skilled therapies at [facility].  This  met with patient to review and schedule care conference.  Meeting scheduled for Wed. 8/7/24.  Patient reports plan remains to return home with spouse with [facility] following.  Patient plans to contact spouse to invite to CC meeting.   This  contacted [facility] liaison Cintia with new referral.  [facility] to follow patient upon DC.  MANA completed.

## 2024-08-05 NOTE — PROGRESS NOTES
Hospitalist Progress Note      PCP: Flor Calderón MD    Date of Admission: 7/30/2024    Chief Complaint: pain/weakness    Subjective: patient in chair, looks comfortable     Medications:  Reviewed    Infusion Medications   Scheduled Medications    lisinopril  5 mg Oral Daily    senna  1 tablet Oral BID    amitriptyline  25 mg Oral Nightly    atorvastatin  20 mg Oral Daily    buPROPion  300 mg Oral QAM    enoxaparin  30 mg SubCUTAneous BID    polyethylene glycol  17 g Oral Daily    sennosides-docusate sodium  1 tablet Oral BID     PRN Meds: acetaminophen, bisacodyl, ondansetron **OR** ondansetron, traMADol **OR** traMADol      Intake/Output Summary (Last 24 hours) at 8/5/2024 0930  Last data filed at 8/5/2024 0536  Gross per 24 hour   Intake 680 ml   Output --   Net 680 ml       Exam:    /71   Pulse 77   Temp 98.4 °F (36.9 °C) (Oral)   Resp 18   Ht 1.67 m (5' 5.75\")   Wt 64.9 kg (143 lb)   SpO2 96%   BMI 23.26 kg/m²     General appearance: No apparent distress, appears stated age and cooperative.  HEENT: Pupils equal, round, and reactive to light. Conjunctivae/corneas clear.  Neck: Supple, with full range of motion. No jugular venous distention. Trachea midline.  Respiratory:  Normal respiratory effort. Clear to auscultation, bilaterally without Rales/Wheezes/Rhonchi.  Cardiovascular: Regular rate and rhythm with normal S1/S2 without murmurs, rubs or gallops.  Abdomen: Soft, non-tender, non-distended with normal bowel sounds.  Musculoskeletal: No clubbing, cyanosis or edema bilaterally.  Full range of motion without deformity.  Skin: Skin color, texture, turgor normal.  No rashes or lesions.  Neurologic:  Neurovascularly intact without any focal sensory/motor deficits. Cranial nerves: II-XII intact, grossly non-focal.  Psychiatric: Alert and oriented, thought content appropriate, normal insight  Capillary Refill: Brisk,< 3 seconds   Peripheral Pulses: +2 palpable, equal bilaterally       Labs:

## 2024-08-05 NOTE — PROGRESS NOTES
Physical Therapy  Facility/Department: [facility] MED SURG UNIT  Daily Treatment Note  NAME: Kristie Jason  : 1955  MRN: [id number]    Date of Service: 2024    Discharge Recommendations:  Continue to assess pending progress, Home with assist PRN, Home with Home health PT        Patient Diagnosis(es): There were no encounter diagnoses.    Assessment   Assessment: Pt. limited by pain and fatigue  Dec ability to follow therapists direction with gait training to dec shuffling gait as pt. having hard time focusing.  Seated therex and functional gait to and from RR>  Pt. needing one uE support at  for balance and CGA with min A for hygene and donning pants and underwear.  Pain post 7-8/10 CP to R hip and thigh post treatment session.  Activity Tolerance: Patient limited by endurance     Plan    Physical Therapy Plan  General Plan: 5-7 times per week  Current Treatment Recommendations: Strengthening;ROM;Balance training;Functional mobility training;Transfer training;Endurance training;Wheelchair mobility training;Gait training;Stair training;Neuromuscular re-education;Pain management;Home exercise program;Safety education & training;Patient/Caregiver education & training;Equipment evaluation, education, & procurement;Modalities;Positioning;Therapeutic activities     Restrictions  Restrictions/Precautions  Restrictions/Precautions: Weight Bearing  Implants present? : Metal implants (R hip hemiarthoplasty)  Lower Extremity Weight Bearing Restrictions  Right Lower Extremity Weight Bearing: Weight Bearing As Tolerated     Subjective    Subjective  Subjective: \"Today is going to be slow I was up all night and my hip is achey.\"  Pain: 7/10 R hip achey     Objective   Vitals     Bed Mobility Training  Bed Mobility Training: Yes  Overall Level of Assistance: Stand-by assistance;Additional time (HOB elevated, use of bed rails)  Interventions: Verbal cues;Safety awareness training;Tactile cues  Rolling: Modified  tolerate 10 reps of B LE ther ex  Long Term Goals  Time Frame for Long Term Goals : 2 weeks LUTHER  Long Term Goal 1: Pt will demonstrate bed mobility indep  Long Term Goal 2: Pt will demonstrate transfers indep  Long Term Goal 3: Pt able to ambulate with AD for >125'x 1 with Mod indep for safe ambulation at home  Long Term Goal 4: Pt able to ambulate up/down 3 steps with HR on L and supervision for safe entry into her home  Long Term Goal 5: Pt indep with an advanced HEP to help to progress with pts strength  Patient Goals   Patient Goals : To be able to move better    Education  Patient Education  Education Given To: Patient  Education Provided:  (3 point gait pattern with WW)  Education Provided Comments: Discussed sequencing with WW use of arms to WB to limit weight on WW and dec shuffling gait  Education Method: Verbal  Barriers to Learning: None  Education Outcome: Verbalized understanding        Therapy Time   Individual Concurrent Group Co-treatment   Time In 0850         Time Out 0930         Minutes 40                 Rose Goodrich, PTA

## 2024-08-06 PROCEDURE — 97116 GAIT TRAINING THERAPY: CPT

## 2024-08-06 PROCEDURE — 6370000000 HC RX 637 (ALT 250 FOR IP): Performed by: INTERNAL MEDICINE

## 2024-08-06 PROCEDURE — 97530 THERAPEUTIC ACTIVITIES: CPT

## 2024-08-06 PROCEDURE — 97535 SELF CARE MNGMENT TRAINING: CPT

## 2024-08-06 PROCEDURE — 1200000002 HC SEMI PRIVATE SWING BED

## 2024-08-06 PROCEDURE — 97110 THERAPEUTIC EXERCISES: CPT

## 2024-08-06 PROCEDURE — 6370000000 HC RX 637 (ALT 250 FOR IP): Performed by: PHYSICIAN ASSISTANT

## 2024-08-06 PROCEDURE — 6360000002 HC RX W HCPCS: Performed by: PHYSICIAN ASSISTANT

## 2024-08-06 RX ADMIN — TRAMADOL HYDROCHLORIDE 100 MG: 50 TABLET, COATED ORAL at 08:18

## 2024-08-06 RX ADMIN — SENNOSIDES AND DOCUSATE SODIUM 1 TABLET: 50; 8.6 TABLET ORAL at 20:33

## 2024-08-06 RX ADMIN — LISINOPRIL 5 MG: 5 TABLET ORAL at 08:13

## 2024-08-06 RX ADMIN — ENOXAPARIN SODIUM 30 MG: 100 INJECTION SUBCUTANEOUS at 20:32

## 2024-08-06 RX ADMIN — ENOXAPARIN SODIUM 30 MG: 100 INJECTION SUBCUTANEOUS at 08:13

## 2024-08-06 RX ADMIN — SENNOSIDES 8.6 MG: 8.6 TABLET, FILM COATED ORAL at 20:33

## 2024-08-06 RX ADMIN — AMITRIPTYLINE HYDROCHLORIDE 25 MG: 25 TABLET, FILM COATED ORAL at 20:33

## 2024-08-06 RX ADMIN — ATORVASTATIN CALCIUM 20 MG: 10 TABLET, FILM COATED ORAL at 08:13

## 2024-08-06 RX ADMIN — POLYETHYLENE GLYCOL 3350 17 G: 17 POWDER, FOR SOLUTION ORAL at 08:13

## 2024-08-06 RX ADMIN — BUPROPION HYDROCHLORIDE 300 MG: 150 TABLET, EXTENDED RELEASE ORAL at 08:13

## 2024-08-06 RX ADMIN — SENNOSIDES 8.6 MG: 8.6 TABLET, FILM COATED ORAL at 08:13

## 2024-08-06 ASSESSMENT — PAIN SCALES - GENERAL
PAINLEVEL_OUTOF10: 2
PAINLEVEL_OUTOF10: 7
PAINLEVEL_OUTOF10: 3

## 2024-08-06 ASSESSMENT — PAIN DESCRIPTION - ORIENTATION
ORIENTATION: RIGHT
ORIENTATION: RIGHT

## 2024-08-06 ASSESSMENT — PAIN DESCRIPTION - PAIN TYPE: TYPE: SURGICAL PAIN

## 2024-08-06 ASSESSMENT — PAIN DESCRIPTION - ONSET: ONSET: ON-GOING

## 2024-08-06 ASSESSMENT — PAIN DESCRIPTION - DESCRIPTORS
DESCRIPTORS: ACHING;SORE
DESCRIPTORS: ACHING

## 2024-08-06 ASSESSMENT — PAIN - FUNCTIONAL ASSESSMENT: PAIN_FUNCTIONAL_ASSESSMENT: ACTIVITIES ARE NOT PREVENTED

## 2024-08-06 ASSESSMENT — PAIN DESCRIPTION - FREQUENCY: FREQUENCY: CONTINUOUS

## 2024-08-06 ASSESSMENT — PAIN DESCRIPTION - LOCATION
LOCATION: HIP
LOCATION: BACK;HIP

## 2024-08-06 NOTE — PROGRESS NOTES
Physical Therapy  Facility/Department: [facility] MED SURG UNIT  Daily Treatment Note  NAME: Kristie Jason  : 1955  MRN: [id number]    Date of Service: 2024    Discharge Recommendations:  (P) Continue to assess pending progress, Home with assist PRN, Home with Home health PT, Outpatient PT        Patient Diagnosis(es): There were no encounter diagnoses.    Assessment   Assessment: (P) Pt. easliy distracted with descrease safety and risk of LOB during gait and stair fucntion. 1 slight LOB demo'd with gait training.  Decrease safety awareness with descending stairs using cane and one rail requiring high guard and min assistance. Pt. able to improve quality of gait pattern with moderate tactile and vc's to focus on task. Pt. also able to demo improve response with performing stairs with B UE on one rail versus one rail and cane this date. Pt. also demo's better response with retro descend versus forward descend due to fear of falling.  Activity Tolerance: (P) Patient limited by pain     Plan    Physical Therapy Plan  General Plan: (P) 5-7 times per week (1-2)  Current Treatment Recommendations: (P) Strengthening;ROM;Balance training;Functional mobility training;Transfer training;Endurance training;Wheelchair mobility training;Gait training;Stair training;Neuromuscular re-education;Pain management;Home exercise program;Safety education & training;Patient/Caregiver education & training;Equipment evaluation, education, & procurement;Modalities;Positioning;Therapeutic activities     Restrictions  Restrictions/Precautions  Restrictions/Precautions: Weight Bearing  Implants present? : Metal implants (R hip hemiarthoplasty)  Lower Extremity Weight Bearing Restrictions  Right Lower Extremity Weight Bearing: Weight Bearing As Tolerated     Subjective    Subjective  Subjective: (P) Pt. up in chair and agreeable to therapy.  Pt. reports fear of falling with stairs.  Pain: LLE 6/10  Orientation  Overall Orientation Status: Within

## 2024-08-06 NOTE — PROGRESS NOTES
Physical Therapy  Facility/Department: [facility] MED SURG UNIT  Daily Treatment Note  NAME: Kristie Jason  : 1955  MRN: [id number]    Date of Service: 2024    Discharge Recommendations:  (P) Continue to assess pending progress, Home with assist PRN, Home with Home health PT        Patient Diagnosis(es): There were no encounter diagnoses.    Assessment   Assessment: (P) Pt. slowing progressing with LE ROM with gait function; however, stioll demo's increase risk of LOB due to narrow LE base of support. Still focusing on LE strength and ROM to benefit with improving quailty of gait and stair function to improve balance and safety. Pt. limited with R LE stand time due to hip pain and soreness.  Activity Tolerance: (P) Patient limited by pain;Patient limited by fatigue     Plan    Physical Therapy Plan  General Plan: (P) 5-7 times per week (1-2)  Current Treatment Recommendations: (P) Strengthening;ROM;Balance training;Functional mobility training;Transfer training;Endurance training;Wheelchair mobility training;Gait training;Stair training;Neuromuscular re-education;Pain management;Home exercise program;Safety education & training;Patient/Caregiver education & training;Equipment evaluation, education, & procurement;Modalities;Positioning;Therapeutic activities     Restrictions  Restrictions/Precautions  Restrictions/Precautions: Weight Bearing  Implants present? : Metal implants (R hip hemiarthoplasty)  Lower Extremity Weight Bearing Restrictions  Right Lower Extremity Weight Bearing: Weight Bearing As Tolerated     Subjective    Subjective  Subjective: Pt. reports back and R hip is sore 7/10     Objective   Vitals     Bed Mobility Training  Bed Mobility Training: Yes  Supine to Sit: Stand-by assistance;Additional time  Balance  Sitting: Intact  Sitting - Static: Good (unsupported)  Sitting - Dynamic: Occasional  Standing: Impaired;With support  Standing - Static: Fair  Transfer Training  Transfer Training:

## 2024-08-06 NOTE — PROGRESS NOTES
Hospitalist Progress Note      PCP: Flor Calderón MD    Date of Admission: 7/30/2024    Chief Complaint: weakness/pain    Subjective: patient awake/alert     Medications:  Reviewed    Infusion Medications   Scheduled Medications    lisinopril  5 mg Oral Daily    senna  1 tablet Oral BID    amitriptyline  25 mg Oral Nightly    atorvastatin  20 mg Oral Daily    buPROPion  300 mg Oral QAM    enoxaparin  30 mg SubCUTAneous BID    polyethylene glycol  17 g Oral Daily    sennosides-docusate sodium  1 tablet Oral BID     PRN Meds: acetaminophen, bisacodyl, ondansetron **OR** ondansetron, traMADol **OR** traMADol      Intake/Output Summary (Last 24 hours) at 8/6/2024 0911  Last data filed at 8/5/2024 1730  Gross per 24 hour   Intake 600 ml   Output --   Net 600 ml       Exam:    /69   Pulse 79   Temp 98.1 °F (36.7 °C) (Oral)   Resp 16   Ht 1.67 m (5' 5.75\")   Wt 62 kg (136 lb 9.6 oz)   SpO2 98%   BMI 22.22 kg/m²     General appearance: No apparent distress, appears stated age and cooperative.  HEENT: Pupils equal, round, and reactive to light. Conjunctivae/corneas clear.  Neck: Supple, with full range of motion. No jugular venous distention. Trachea midline.  Respiratory:  Normal respiratory effort. Clear to auscultation, bilaterally without Rales/Wheezes/Rhonchi.  Cardiovascular: Regular rate and rhythm with normal S1/S2 without murmurs, rubs or gallops.  Abdomen: Soft, non-tender, non-distended with normal bowel sounds.  Musculoskeletal: No clubbing, cyanosis or edema bilaterally.  Full range of motion without deformity.  Skin: Skin color, texture, turgor normal.  No rashes or lesions.  Neurologic:  Neurovascularly intact without any focal sensory/motor deficits. Cranial nerves: II-XII intact, grossly non-focal.  Psychiatric: Alert and oriented, thought content appropriate, normal insight  Capillary Refill: Brisk,< 3 seconds   Peripheral Pulses: +2 palpable, equal bilaterally       Labs:   Recent Labs      08/05/24  0529   WBC 6.6   HGB 9.4*   HCT 28.8*        Recent Labs     08/05/24  0537      K 4.2      CO2 25   BUN 21   CREATININE 0.55   CALCIUM 8.4*     No results for input(s): \"AST\", \"ALT\", \"BILIDIR\", \"BILITOT\", \"ALKPHOS\" in the last 72 hours.  No results for input(s): \"INR\" in the last 72 hours.  No results for input(s): \"CKTOTAL\", \"TROPONINI\" in the last 72 hours.    Urinalysis:      Lab Results   Component Value Date/Time    NITRU Negative 07/31/2024 12:34 PM    WBCUA 0-2 07/31/2024 12:34 PM    BACTERIA FEW 07/31/2024 12:34 PM    RBCUA 0-2 07/31/2024 12:34 PM    BLOODU Negative 07/31/2024 12:34 PM    GLUCOSEU Negative 07/31/2024 12:34 PM       Radiology:  No orders to display           Assessment/Plan:    Active Hospital Problems    Diagnosis Date Noted    Closed displaced articular fracture of head of right femur with nonunion [S72.061K] 07/31/2024         DVT Prophylaxis: lovenox  Diet: ADULT DIET; Dysphagia - Soft and Bite Sized  ADULT ORAL NUTRITION SUPPLEMENT; Breakfast, Lunch, Dinner; Standard High Calorie/High Protein Oral Supplement  ADULT ORAL NUTRITION SUPPLEMENT; Breakfast, Lunch, Dinner; Frozen Oral Supplement  Code Status: Full Code    PT/OT Eval Status: done    Dispo -  R hip fracture, post repair, pain controlled, participated with PT   Leucocytosis- resolved, CBC done today   HTN- controlled   Medically stable for skilled admission at [facility]     TTS: 45 minutes where I focused more than 75% of my attention on rendering care, and planning treatment course for this patient, in addition to talking to RN team, mid levels, consulting with other physicians and following up on labs and imaging.    Electronically signed by Derrick Gonzalez MD on 8/6/2024 at 9:11 AM

## 2024-08-06 NOTE — PROGRESS NOTES
Facility/Department: [facility] SUBACUTE UNIT  Daily Treatment Note  NAME: Kristie Jason  : 1955  MRN: [id number]    Date of Service: 2024    Discharge Recommendations:  Continue to assess pending progress, Home with Home health OT         Patient Diagnosis(es): There were no encounter diagnoses.     Assessment    Assessment: Pt presents in chair and is ready for shower, water-proof dressing was applied per RN instruction to L hip to cover staples. Pt demo's progress with independence during self care tasks, OT educated her on posture, positioning, safety as needed. Pt seated in chair with all needs upon OT departure.  Activity Tolerance: Patient limited by pain  Discharge Recommendations: Continue to assess pending progress;Home with Home health OT      Plan   Occupational Therapy Plan  Times Per Week: 4-7  Times Per Day: Once a day  Current Treatment Recommendations: Strengthening;Balance training;Functional mobility training;Endurance training;Safety education & training;Patient/Caregiver education & training;Self-Care / ADL;Home management training;Equipment evaluation, education, & procurement;Positioning     Restrictions   WBAT LLE    Subjective   Subjective  Subjective: \"I want to get in the shower.\"  Pain: LLE 6/10  Orientation  Overall Orientation Status: Within Functional Limits  Orientation Level: Oriented X4  Pain: LLE 6/10  Cognition  Overall Cognitive Status: WFL  Cognition Comment: Alert, cooperative, pleasant        Objective    Bed Mobility Training  Bed Mobility Training: No    Balance  Sitting: Intact  Sitting - Static: Good (unsupported)  Sitting - Dynamic: Occasional  Standing: Impaired;With support (FWW used SBA)  Standing - Static: Fair  Transfer Training  Transfer Training: Yes  Overall Level of Assistance: Contact-guard assistance (FWW)  Sit to Stand: Contact-guard assistance  Stand to Sit: Contact-guard assistance  Stand Pivot Transfers: Contact-guard assistance  Toilet Transfer: Stand-by

## 2024-08-07 ENCOUNTER — APPOINTMENT (OUTPATIENT)
Dept: ULTRASOUND IMAGING | Age: 69
End: 2024-08-07
Attending: INTERNAL MEDICINE
Payer: MEDICARE

## 2024-08-07 PROCEDURE — 93970 EXTREMITY STUDY: CPT

## 2024-08-07 PROCEDURE — 6360000002 HC RX W HCPCS: Performed by: PHYSICIAN ASSISTANT

## 2024-08-07 PROCEDURE — 6370000000 HC RX 637 (ALT 250 FOR IP): Performed by: PHYSICIAN ASSISTANT

## 2024-08-07 PROCEDURE — 97530 THERAPEUTIC ACTIVITIES: CPT

## 2024-08-07 PROCEDURE — 97110 THERAPEUTIC EXERCISES: CPT

## 2024-08-07 PROCEDURE — 1200000002 HC SEMI PRIVATE SWING BED

## 2024-08-07 PROCEDURE — 97535 SELF CARE MNGMENT TRAINING: CPT

## 2024-08-07 PROCEDURE — 6370000000 HC RX 637 (ALT 250 FOR IP): Performed by: INTERNAL MEDICINE

## 2024-08-07 PROCEDURE — 97116 GAIT TRAINING THERAPY: CPT

## 2024-08-07 RX ORDER — GABAPENTIN 100 MG/1
100 CAPSULE ORAL 3 TIMES DAILY PRN
Status: DISCONTINUED | OUTPATIENT
Start: 2024-08-07 | End: 2024-08-14 | Stop reason: HOSPADM

## 2024-08-07 RX ADMIN — AMITRIPTYLINE HYDROCHLORIDE 25 MG: 25 TABLET, FILM COATED ORAL at 21:46

## 2024-08-07 RX ADMIN — ENOXAPARIN SODIUM 30 MG: 100 INJECTION SUBCUTANEOUS at 21:46

## 2024-08-07 RX ADMIN — ENOXAPARIN SODIUM 30 MG: 100 INJECTION SUBCUTANEOUS at 08:14

## 2024-08-07 RX ADMIN — BUPROPION HYDROCHLORIDE 300 MG: 150 TABLET, EXTENDED RELEASE ORAL at 08:14

## 2024-08-07 RX ADMIN — GABAPENTIN 100 MG: 100 CAPSULE ORAL at 21:46

## 2024-08-07 RX ADMIN — SENNOSIDES 8.6 MG: 8.6 TABLET, FILM COATED ORAL at 08:14

## 2024-08-07 RX ADMIN — SENNOSIDES 8.6 MG: 8.6 TABLET, FILM COATED ORAL at 21:46

## 2024-08-07 RX ADMIN — LISINOPRIL 5 MG: 5 TABLET ORAL at 08:14

## 2024-08-07 RX ADMIN — ATORVASTATIN CALCIUM 20 MG: 10 TABLET, FILM COATED ORAL at 08:14

## 2024-08-07 RX ADMIN — SENNOSIDES AND DOCUSATE SODIUM 1 TABLET: 50; 8.6 TABLET ORAL at 21:46

## 2024-08-07 RX ADMIN — GABAPENTIN 100 MG: 100 CAPSULE ORAL at 11:07

## 2024-08-07 RX ADMIN — POLYETHYLENE GLYCOL 3350 17 G: 17 POWDER, FOR SOLUTION ORAL at 08:13

## 2024-08-07 RX ADMIN — SENNOSIDES AND DOCUSATE SODIUM 1 TABLET: 50; 8.6 TABLET ORAL at 08:14

## 2024-08-07 NOTE — PROGRESS NOTES
Physical Therapy  Facility/Department: [facility] MED SURG UNIT  Daily Treatment Note  NAME: Kristie Jason  : 1955  MRN: [id number]    Date of Service: 2024    Discharge Recommendations:  Continue to assess pending progress, Home with assist PRN, Home with Home health PT, Outpatient PT        Patient Diagnosis(es): There were no encounter diagnoses.    Assessment   Assessment: Pt. was able to progress with standing therex this date tolerates on RLE.  Pt. requiring seated RB's throughout.  Better step width noted this date with walking but continues with dec safety awareness noted needing cues to keep hands on WW when people passing by in hallway.  Applied CP to R hip post session to dec pain and swelling.  Pain post 4/10.  Activity Tolerance: Patient tolerated treatment well     Plan    Physical Therapy Plan  General Plan: 5-7 times per week (1-2)  Current Treatment Recommendations: Strengthening;ROM;Balance training;Functional mobility training;Transfer training;Endurance training;Wheelchair mobility training;Gait training;Stair training;Neuromuscular re-education;Pain management;Home exercise program;Safety education & training;Patient/Caregiver education & training;Equipment evaluation, education, & procurement;Modalities;Positioning;Therapeutic activities     Restrictions  Restrictions/Precautions  Restrictions/Precautions: Weight Bearing  Implants present? : Metal implants (R hip hemiarthoplasty)  Lower Extremity Weight Bearing Restrictions  Right Lower Extremity Weight Bearing: Weight Bearing As Tolerated     Subjective    Subjective  Subjective: Pt. sitting up in recliner pain level 5/10.  Pain: 5/10 L L E     Objective   Vitals     Bed Mobility Training  Bed Mobility Training: No  Overall Level of Assistance: Supervision  Supine to Sit: Supervision  Balance  Sitting: Intact  Standing: Impaired;With support (FWW used SBA)  Transfer Training  Transfer Training: Yes  Overall Level of Assistance: Contact-guard

## 2024-08-07 NOTE — PROGRESS NOTES
Met with patient and family for weekly care conference, current diet remains appropriate and well tolerated . Intake appears adequate to meet estimated needs at this time  with meals and supplements. No d/c needs identified at this time. Current weight ordered for further assessment. RD to continue to monitor for duration of admission.

## 2024-08-07 NOTE — PROGRESS NOTES
Physical Therapy  Facility/Department: [facility] MED SURG UNIT  Daily Treatment Note  NAME: Kristie Jason  : 1955  MRN: [id number]    Date of Service: 2024    Discharge Recommendations:  Continue to assess pending progress, Home with assist PRN, Home with Home health PT, Outpatient PT        Patient Diagnosis(es): There were no encounter diagnoses.    Assessment   Assessment: Pt. limited by pain and anxiety.  Time spent discussing pt. options with stair training.  Pt. needing to use RR at onset of session.  CGA in standing for clothing management.  Prefers BUE on L HR with stair training.  Cues still needed to inc step length, width and height.  Pain holds 6-7/10 post treatment.  Seated in WC post session awaiting care conference.  Activity Tolerance: Patient limited by pain     Plan    Physical Therapy Plan  General Plan: 5-7 times per week (1-2)  Current Treatment Recommendations: Strengthening;ROM;Balance training;Functional mobility training;Transfer training;Endurance training;Wheelchair mobility training;Gait training;Stair training;Neuromuscular re-education;Pain management;Home exercise program;Safety education & training;Patient/Caregiver education & training;Equipment evaluation, education, & procurement;Modalities;Positioning;Therapeutic activities     Restrictions  Restrictions/Precautions  Restrictions/Precautions: Weight Bearing  Implants present? : Metal implants (R hip hemiarthoplasty)  Lower Extremity Weight Bearing Restrictions  Right Lower Extremity Weight Bearing: Weight Bearing As Tolerated     Subjective    Subjective  Subjective: Pt. states she is having some anxiety and feels sweaty.   Pt. stats she is worried of doing the stairs and other things.  Pain: L LE 6-7/10.     Objective   Vitals     Bed Mobility Training  Bed Mobility Training: Yes  Overall Level of Assistance: Supervision  Supine to Sit: Supervision  Balance  Sitting: Intact  Standing: Impaired;With support (FWW used

## 2024-08-07 NOTE — PROGRESS NOTES
Chart reviewed.  Patient continues to receive skilled therapies at [facility].  Multidisciplinary team met with patient and spouse in care conference to discuss patient's care and on-going DC planning.  Patient and spouse report feeling confident in the care patient is currently receiving at [facility].  Patient and family report plan remains for patient to continue to get stronger and be able to safely get in and out of vehicle before returning home.  Therapy has arranged family instruct with patient's spouse to practice car transfers with patient this Friday 8/9/24.  Therapy recommending HHC and prn assist upon DC.  [place] of choice provided.  [facility] has been identified and arranged by this  to follow patient upon DC from [facility]. Patient and spouse identify no DME needs.  SS discussed barriers to recovery.  Patient reports history of anxiety and that anxiety can be a barrier to patient's progress in therapy.  Patient reports having medication ordered at home to take as needed to help alleviate anxiety.  Patient reports experiencing an increase in anxiety since \"yesterday\" and requesting prn medication to control symptoms.  [facility] RN House supervisor reports that communication was sent to Dr. Gonzalez and new order for medication was received.    SS followed up after care conference and spoke with [facility] liaison Cintia whom confirms [facility] can follow patient upon DC.

## 2024-08-07 NOTE — PROGRESS NOTES
Facility/Department: [facility] SUBACUTE UNIT  Daily Treatment Note  NAME: Kristie Jason  : 1955  MRN: [id number]    Date of Service: 2024    Discharge Recommendations:  Continue to assess pending progress, Home with Home health OT         Patient Diagnosis(es): The primary encounter diagnosis was Closed displaced articular fracture of head of right femur with nonunion. Diagnoses of Post-op pain, Acute blood loss anemia, Ataxia, Central vestibular vertigo, Dizziness, Acute pain due to trauma, QT prolongation, SAH (subarachnoid hemorrhage) (HCC), Subdural hematoma (HCC), and Cerebral contusion and laceration (HCC) were also pertinent to this visit.     Assessment    Assessment: Pt presents in chair and states she has been anxious today and sweating, requests a shower, pt slowly progressing self care skills, limited by stiffness and pain in L hip. Pt is to have US on LLE today d/t pain and edema from knee down. Pt in bed with x2 ice packs upon OT departure and all needs within reach.  Activity Tolerance: Patient limited by pain  Discharge Recommendations: Continue to assess pending progress;Home with Home health OT      Plan   Occupational Therapy Plan  Times Per Week: 4-7  Times Per Day: Once a day  Current Treatment Recommendations: Strengthening;Balance training;Functional mobility training;Endurance training;Safety education & training;Patient/Caregiver education & training;Self-Care / ADL;Home management training;Equipment evaluation, education, & procurement;Positioning     Restrictions   WBAT LLE    Subjective   Subjective  Subjective: \"I feel anxious today and my foot is swollen.\"  Pain: L hip 2/10  Orientation  Overall Orientation Status: Within Functional Limits  Orientation Level: Oriented X4  Pain: L hip 2/10  Cognition  Overall Cognitive Status: WFL  Cognition Comment: Alert, cooperative, pleasant        Objective    Bed Mobility Training  Bed Mobility Training: Yes  Overall Level of Assistance: Stand-by

## 2024-08-07 NOTE — PROGRESS NOTES
Facility/Department: [facility] SUBACUTE UNIT  Daily Treatment Note  NAME: Kristie Jason  : 1955  MRN: [id number]    Date of Service: 2024    Discharge Recommendations:  Continue to assess pending progress, Home with assist PRN, Home with Home health PT, Outpatient PT        Patient Diagnosis(es): There were no encounter diagnoses.    Assessment   Plan    Physical Therapy Plan  General Plan: 5-7 times per week (1-2)  Current Treatment Recommendations: Strengthening;ROM;Balance training;Functional mobility training;Transfer training;Endurance training;Wheelchair mobility training;Gait training;Stair training;Neuromuscular re-education;Pain management;Home exercise program;Safety education & training;Patient/Caregiver education & training;Equipment evaluation, education, & procurement;Modalities;Positioning;Therapeutic activities     Restrictions  Restrictions/Precautions  Restrictions/Precautions: Weight Bearing  Implants present? : Metal implants (R hip hemiarthoplasty)  Lower Extremity Weight Bearing Restrictions  Right Lower Extremity Weight Bearing: Weight Bearing As Tolerated     Subjective    Subjective  Subjective: Pt. states she is having some anxiety and feels sweaty.   Pt. stats she is worried of doing the stairs and other things.  Pain: L LE 6-7/10.     Objective   Pt and spouse present for Interdisciplinary Care Conference this date. See separate note for full report.    Pt able to initiate stairs this week into home ( 3 with L rail) - currently needing 2 rails.    Sit to stand to FWW, mild inc pain- stiff after sitting  Amb 5 ft ot recliner , FWW< CGA, good safety awareness    Goals  Short Term Goals  Time Frame for Short Term Goals: 1 week LUTHER  Short Term Goal 1: Min A for bed mobility  Short Term Goal 2: CGA with transfers  Short Term Goal 3: Pt able to ambulate with AD for 50' x 1 with CGA and safe technique  Short Term Goal 4: Pt able to ambulate up/down 1 steps with CGA  Short Term Goal 5: Pt able

## 2024-08-08 LAB
ANION GAP SERPL CALCULATED.3IONS-SCNC: 9 MEQ/L (ref 9–15)
BASOPHILS # BLD: 0.1 K/UL (ref 0–0.1)
BASOPHILS NFR BLD: 0.6 % (ref 0.1–1.2)
BUN SERPL-MCNC: 23 MG/DL (ref 8–23)
CALCIUM SERPL-MCNC: 8.8 MG/DL (ref 8.5–9.9)
CHLORIDE SERPL-SCNC: 106 MEQ/L (ref 95–107)
CO2 SERPL-SCNC: 26 MEQ/L (ref 20–31)
CREAT SERPL-MCNC: 0.63 MG/DL (ref 0.5–0.9)
EOSINOPHIL # BLD: 0.3 K/UL (ref 0–0.4)
EOSINOPHIL NFR BLD: 2.5 % (ref 0.7–5.8)
ERYTHROCYTE [DISTWIDTH] IN BLOOD BY AUTOMATED COUNT: 13.6 % (ref 11.7–14.4)
GLUCOSE SERPL-MCNC: 117 MG/DL (ref 70–99)
HCT VFR BLD AUTO: 30.3 % (ref 37–47)
HGB BLD-MCNC: 9.8 G/DL (ref 11.2–15.7)
IMM GRANULOCYTES # BLD: 0.2 K/UL
IMM GRANULOCYTES NFR BLD: 1.7 %
LYMPHOCYTES # BLD: 1.8 K/UL (ref 1.2–3.7)
LYMPHOCYTES NFR BLD: 18.3 %
MCH RBC QN AUTO: 30.2 PG (ref 25.6–32.2)
MCHC RBC AUTO-ENTMCNC: 32.3 % (ref 32.2–35.5)
MCV RBC AUTO: 93.5 FL (ref 79.4–94.8)
MONOCYTES # BLD: 1 K/UL (ref 0.2–0.9)
MONOCYTES NFR BLD: 10.5 % (ref 4.7–12.5)
NEUTROPHILS # BLD: 6.5 K/UL (ref 1.6–6.1)
NEUTS SEG NFR BLD: 66.4 % (ref 34–71.1)
PLATELET # BLD AUTO: 521 K/UL (ref 182–369)
POTASSIUM SERPL-SCNC: 4.6 MEQ/L (ref 3.4–4.9)
RBC # BLD AUTO: 3.24 M/UL (ref 3.93–5.22)
SODIUM SERPL-SCNC: 141 MEQ/L (ref 135–144)
WBC # BLD AUTO: 9.9 K/UL (ref 4–10)

## 2024-08-08 PROCEDURE — 97530 THERAPEUTIC ACTIVITIES: CPT

## 2024-08-08 PROCEDURE — 1200000002 HC SEMI PRIVATE SWING BED

## 2024-08-08 PROCEDURE — 97535 SELF CARE MNGMENT TRAINING: CPT

## 2024-08-08 PROCEDURE — 6370000000 HC RX 637 (ALT 250 FOR IP): Performed by: INTERNAL MEDICINE

## 2024-08-08 PROCEDURE — 97110 THERAPEUTIC EXERCISES: CPT

## 2024-08-08 PROCEDURE — 36415 COLL VENOUS BLD VENIPUNCTURE: CPT

## 2024-08-08 PROCEDURE — 85025 COMPLETE CBC W/AUTO DIFF WBC: CPT

## 2024-08-08 PROCEDURE — 6360000002 HC RX W HCPCS: Performed by: PHYSICIAN ASSISTANT

## 2024-08-08 PROCEDURE — 6370000000 HC RX 637 (ALT 250 FOR IP): Performed by: PHYSICIAN ASSISTANT

## 2024-08-08 PROCEDURE — 97116 GAIT TRAINING THERAPY: CPT

## 2024-08-08 PROCEDURE — 80048 BASIC METABOLIC PNL TOTAL CA: CPT

## 2024-08-08 RX ADMIN — TRAMADOL HYDROCHLORIDE 100 MG: 50 TABLET, COATED ORAL at 23:53

## 2024-08-08 RX ADMIN — LISINOPRIL 5 MG: 5 TABLET ORAL at 08:28

## 2024-08-08 RX ADMIN — SENNOSIDES AND DOCUSATE SODIUM 1 TABLET: 50; 8.6 TABLET ORAL at 08:29

## 2024-08-08 RX ADMIN — ENOXAPARIN SODIUM 30 MG: 100 INJECTION SUBCUTANEOUS at 21:16

## 2024-08-08 RX ADMIN — SENNOSIDES 8.6 MG: 8.6 TABLET, FILM COATED ORAL at 21:16

## 2024-08-08 RX ADMIN — TRAMADOL HYDROCHLORIDE 100 MG: 50 TABLET, COATED ORAL at 08:08

## 2024-08-08 RX ADMIN — GABAPENTIN 100 MG: 100 CAPSULE ORAL at 21:16

## 2024-08-08 RX ADMIN — SENNOSIDES 8.6 MG: 8.6 TABLET, FILM COATED ORAL at 08:29

## 2024-08-08 RX ADMIN — ATORVASTATIN CALCIUM 20 MG: 10 TABLET, FILM COATED ORAL at 08:29

## 2024-08-08 RX ADMIN — ENOXAPARIN SODIUM 30 MG: 100 INJECTION SUBCUTANEOUS at 08:28

## 2024-08-08 RX ADMIN — POLYETHYLENE GLYCOL 3350 17 G: 17 POWDER, FOR SOLUTION ORAL at 08:28

## 2024-08-08 RX ADMIN — TRAMADOL HYDROCHLORIDE 100 MG: 50 TABLET, COATED ORAL at 17:04

## 2024-08-08 RX ADMIN — BUPROPION HYDROCHLORIDE 300 MG: 150 TABLET, EXTENDED RELEASE ORAL at 08:29

## 2024-08-08 RX ADMIN — AMITRIPTYLINE HYDROCHLORIDE 25 MG: 25 TABLET, FILM COATED ORAL at 21:16

## 2024-08-08 RX ADMIN — SENNOSIDES AND DOCUSATE SODIUM 1 TABLET: 50; 8.6 TABLET ORAL at 21:16

## 2024-08-08 ASSESSMENT — PAIN DESCRIPTION - LOCATION
LOCATION: HEAD
LOCATION: HEAD;HIP
LOCATION: OTHER (COMMENT)

## 2024-08-08 ASSESSMENT — PAIN SCALES - GENERAL
PAINLEVEL_OUTOF10: 7
PAINLEVEL_OUTOF10: 7
PAINLEVEL_OUTOF10: 3
PAINLEVEL_OUTOF10: 8

## 2024-08-08 ASSESSMENT — PAIN DESCRIPTION - DESCRIPTORS
DESCRIPTORS: ACHING;POUNDING;SPASM
DESCRIPTORS: ACHING

## 2024-08-08 ASSESSMENT — PAIN DESCRIPTION - ORIENTATION
ORIENTATION: OTHER (COMMENT)
ORIENTATION: RIGHT

## 2024-08-08 NOTE — CARE COORDINATION
Radha STEVENS OT s/w re: concerns the patient's spouse re: DC to home and not being able to meet her needs.  I met with the patient, the spouse has already left.  She stated she is not going to a nursing home.  Plan is to DC home. Her biggest concern is obtaining DME for DC. I sent Brigitte Garcia PT a message to address the patient's concerns.    3:23pm.:  Brigitte PT informed that the patient will need to self-purchase a front wheels walker. I made patient aware.

## 2024-08-08 NOTE — PROGRESS NOTES
Weight Bearing As Tolerated     Subjective    Subjective  Subjective: Pt. reports R hip pain is not as bad with stabbing pain but hurts 5/10. R leg is rody to stand on due to R hip pain.     Objective   Vitals     Transfer Training  Transfer Training: Yes  Sit to Stand: Stand-by assistance  Stand to Sit: Stand-by assistance  Gait Training  Gait Training: Yes  Gait  Gait Training: Yes  Overall Level of Assistance: Stand-by assistance  Distance (ft):  (70'x2)  Assistive Device: Walker, rolling  Base of Support: Narrowed (decrease step lenght and width initially. Pt. able to improve however, demo's limited carryover following verbal cues. Pt. easily distracted with talking.)  Stance: Right decreased  Gait Abnormalities: Decreased step clearance     PT Exercises  Static Standing Balance Exercises: // bars Supported standing ther ex B LE: Heel raises x 10', hamstring curls x 10', Hip flexion x 10', Hip abduction x 10', Mini squats x 10'.             Goals  Short Term Goals  Time Frame for Short Term Goals: 1 week LUTHER  Short Term Goal 1: Min A for bed mobility  Short Term Goal 2: CGA with transfers  Short Term Goal 3: Pt able to ambulate with AD for 50' x 1 with CGA and safe technique  Short Term Goal 4: Pt able to ambulate up/down 1 steps with CGA  Short Term Goal 5: Pt able to tolerate 10 reps of B LE ther ex  Long Term Goals  Time Frame for Long Term Goals : 2 weeks LUTHER  Long Term Goal 1: Pt will demonstrate bed mobility indep  Long Term Goal 2: Pt will demonstrate transfers indep  Long Term Goal 3: Pt able to ambulate with AD for >125'x 1 with Mod indep for safe ambulation at home  Long Term Goal 4: Pt able to ambulate up/down 3 steps with HR on L and supervision for safe entry into her home  Long Term Goal 5: Pt indep with an advanced HEP to help to progress with pts strength  Patient Goals   Patient Goals : To be able to move better    Education       AM-PAC - Mobility              Therapy Time   Individual

## 2024-08-08 NOTE — PROGRESS NOTES
Facility/Department: 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 Diagnoses of Post-op pain, Acute blood loss anemia, Ataxia, Central vestibular vertigo, Dizziness, Acute pain due to trauma, QT prolongation, SAH (subarachnoid hemorrhage) (HCC), Subdural hematoma (HCC), and Cerebral contusion and laceration (HCC) were also pertinent to this visit.     Assessment    Assessment: Pt presents in chair with spouse present, spouse voiced he is concerned caring for pt at home and isn't sure if he can safely help her. OT listened to spouse's concerns and was able to have pt demonstrate some of the activities he was worried about such as getting dressing and using the toilet. Spouse appeared more relieved and confident at session conclusion but will still speak with RN supervisor today to ensure d/c plan is confirmed. Pt progressed to SBA for mobility today with no LOB. One vc needed to back walker all the way up before initiating sit. Discussed FWW and walker glides for homegoing. Will continue to assess d/c needs.  Activity Tolerance: Patient tolerated treatment well  Discharge Recommendations: Continue to assess pending progress;Home with Home health OT      Plan   Occupational Therapy Plan  Times Per Week: 4-7  Times Per Day: Once a day  Current Treatment Recommendations: Strengthening;Balance training;Functional mobility training;Endurance training;Safety education & training;Patient/Caregiver education & training;Self-Care / ADL;Home management training;Equipment evaluation, education, & procurement;Positioning     Restrictions   RLE WBAT    Subjective   Subjective  Subjective: \"I want to go home this weekend.\"  Pain:  4/10 R hip  Orientation  Overall Orientation Status: Within Functional Limits  Orientation Level: Oriented X4  Pain: R hip 4/10  Cognition  Overall Cognitive Status: WFL  Cognition Comment: Alert, cooperative, pleasant        Objective    Bed Mobility Training  Bed Mobility Training: No  Balance  Sitting: Intact  Standing: Intact;With support (FWW used close supervision)  Transfer Training  Transfer Training: Yes  Overall Level of Assistance: Stand-by assistance;Supervision (close supervision for transfers with FWW)  Interventions: Safety awareness training;Verbal cues  Sit to Stand: Stand-by assistance;Supervision  Stand to Sit: Supervision;Stand-by assistance  Toilet Transfer: Supervision;Stand-by assistance       ADL  Grooming: Stand by assistance  Grooming Skilled Clinical Factors: Hand wash at sink with FWW in place  LE Dressing: Stand by assistance;Verbal cueing;Adaptive equipment  LE Dressing Skilled Clinical Factors: Pt used to sock aide to rylee R sock  OT Exercises  Functional Mobility Circuit Training: Chair>toilet>sink>chair with FWW close supervision, improved posture and use of device     Safety Devices  Type of Devices: Left in chair;Call light within reach;Chair alarm in place;Gait belt     Patient Education  Education Given To: Patient;Family  Education Provided: Role of Therapy;Plan of Care;Precautions;Equipment;ADL Adaptive Strategies;Transfer Training;Fall Prevention Strategies;Energy Conservation;Mobility Training;Family Education  Education Method: Demonstration;Verbal;Teach Back  Barriers to Learning: None  Education Outcome: Verbalized understanding;Demonstrated understanding    Goals  Short Term Goals  Time Frame for Short Term Goals: 5-7 days  Short Term Goal 1: Doff/rylee UB/LB clothing CGA  Short Term Goal 2: Bathe UB/LB CGA  Short Term Goal 3: Complete toileting CGA  Short Term Goal 4: Complete functional mobility and transfers CGA  Short Term Goal 5: Demo BUE HEP with min vc  Long Term

## 2024-08-08 NOTE — PROGRESS NOTES
Hospitalist Progress Note      PCP: Flor Calderón MD    Date of Admission: 7/30/2024    Chief Complaint: weakness    Subjective: patient in chair     Medications:  Reviewed    Infusion Medications   Scheduled Medications    lisinopril  5 mg Oral Daily    senna  1 tablet Oral BID    amitriptyline  25 mg Oral Nightly    atorvastatin  20 mg Oral Daily    buPROPion  300 mg Oral QAM    enoxaparin  30 mg SubCUTAneous BID    polyethylene glycol  17 g Oral Daily    sennosides-docusate sodium  1 tablet Oral BID     PRN Meds: gabapentin, acetaminophen, bisacodyl, ondansetron **OR** ondansetron, traMADol **OR** traMADol      Intake/Output Summary (Last 24 hours) at 8/8/2024 0932  Last data filed at 8/8/2024 0541  Gross per 24 hour   Intake 720 ml   Output --   Net 720 ml       Exam:    /73   Pulse 85   Temp 97.9 °F (36.6 °C) (Oral)   Resp 18   Ht 1.67 m (5' 5.75\")   Wt 62 kg (136 lb 9.6 oz)   SpO2 98%   BMI 22.22 kg/m²     General appearance: No apparent distress, appears stated age and cooperative.  HEENT: Pupils equal, round, and reactive to light. Conjunctivae/corneas clear.  Neck: Supple, with full range of motion. No jugular venous distention. Trachea midline.  Respiratory:  Normal respiratory effort. Clear to auscultation, bilaterally without Rales/Wheezes/Rhonchi.  Cardiovascular: Regular rate and rhythm with normal S1/S2 without murmurs, rubs or gallops.  Abdomen: Soft, non-tender, non-distended with normal bowel sounds.  Musculoskeletal: No clubbing, cyanosis or edema bilaterally.  Full range of motion without deformity.  Skin: Skin color, texture, turgor normal.  No rashes or lesions.  Neurologic:  Neurovascularly intact without any focal sensory/motor deficits. Cranial nerves: II-XII intact, grossly non-focal.  Psychiatric: Alert and oriented, thought content appropriate, normal insight  Capillary Refill: Brisk,< 3 seconds   Peripheral Pulses: +2 palpable, equal bilaterally       Labs:   Recent Labs

## 2024-08-09 PROCEDURE — 97116 GAIT TRAINING THERAPY: CPT

## 2024-08-09 PROCEDURE — 97110 THERAPEUTIC EXERCISES: CPT

## 2024-08-09 PROCEDURE — 97530 THERAPEUTIC ACTIVITIES: CPT

## 2024-08-09 PROCEDURE — 1200000002 HC SEMI PRIVATE SWING BED

## 2024-08-09 PROCEDURE — 6370000000 HC RX 637 (ALT 250 FOR IP): Performed by: PHYSICIAN ASSISTANT

## 2024-08-09 PROCEDURE — 6370000000 HC RX 637 (ALT 250 FOR IP): Performed by: INTERNAL MEDICINE

## 2024-08-09 PROCEDURE — 6360000002 HC RX W HCPCS: Performed by: PHYSICIAN ASSISTANT

## 2024-08-09 RX ADMIN — SENNOSIDES AND DOCUSATE SODIUM 1 TABLET: 50; 8.6 TABLET ORAL at 20:59

## 2024-08-09 RX ADMIN — LISINOPRIL 5 MG: 5 TABLET ORAL at 08:20

## 2024-08-09 RX ADMIN — ENOXAPARIN SODIUM 30 MG: 100 INJECTION SUBCUTANEOUS at 08:18

## 2024-08-09 RX ADMIN — TRAMADOL HYDROCHLORIDE 50 MG: 50 TABLET, COATED ORAL at 08:18

## 2024-08-09 RX ADMIN — SENNOSIDES 8.6 MG: 8.6 TABLET, FILM COATED ORAL at 20:59

## 2024-08-09 RX ADMIN — SENNOSIDES 8.6 MG: 8.6 TABLET, FILM COATED ORAL at 08:20

## 2024-08-09 RX ADMIN — SENNOSIDES AND DOCUSATE SODIUM 1 TABLET: 50; 8.6 TABLET ORAL at 08:20

## 2024-08-09 RX ADMIN — POLYETHYLENE GLYCOL 3350 17 G: 17 POWDER, FOR SOLUTION ORAL at 08:18

## 2024-08-09 RX ADMIN — TRAMADOL HYDROCHLORIDE 100 MG: 50 TABLET, COATED ORAL at 21:03

## 2024-08-09 RX ADMIN — GABAPENTIN 100 MG: 100 CAPSULE ORAL at 08:19

## 2024-08-09 RX ADMIN — ENOXAPARIN SODIUM 30 MG: 100 INJECTION SUBCUTANEOUS at 20:59

## 2024-08-09 RX ADMIN — AMITRIPTYLINE HYDROCHLORIDE 25 MG: 25 TABLET, FILM COATED ORAL at 20:59

## 2024-08-09 RX ADMIN — GABAPENTIN 100 MG: 100 CAPSULE ORAL at 21:03

## 2024-08-09 RX ADMIN — ATORVASTATIN CALCIUM 20 MG: 10 TABLET, FILM COATED ORAL at 08:19

## 2024-08-09 RX ADMIN — BUPROPION HYDROCHLORIDE 300 MG: 150 TABLET, EXTENDED RELEASE ORAL at 08:19

## 2024-08-09 ASSESSMENT — PAIN - FUNCTIONAL ASSESSMENT
PAIN_FUNCTIONAL_ASSESSMENT: ACTIVITIES ARE NOT PREVENTED

## 2024-08-09 ASSESSMENT — PAIN SCALES - GENERAL
PAINLEVEL_OUTOF10: 6
PAINLEVEL_OUTOF10: 5
PAINLEVEL_OUTOF10: 2

## 2024-08-09 ASSESSMENT — PAIN DESCRIPTION - DESCRIPTORS
DESCRIPTORS: ACHING

## 2024-08-09 ASSESSMENT — PAIN DESCRIPTION - ORIENTATION
ORIENTATION: RIGHT;MID
ORIENTATION: RIGHT;LEFT;MID;ANTERIOR

## 2024-08-09 ASSESSMENT — PAIN DESCRIPTION - LOCATION
LOCATION: HIP;HEAD
LOCATION: HEAD
LOCATION: HEAD

## 2024-08-09 NOTE — PROGRESS NOTES
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 Diagnoses of Post-op pain, Acute blood loss anemia, Ataxia, Central vestibular vertigo, Dizziness, Acute pain due to trauma, QT prolongation, SAH (subarachnoid hemorrhage) (HCC), Subdural hematoma (HCC), and Cerebral contusion and laceration (HCC) were also pertinent to this visit.     Assessment    Assessment: Pt presents in recliner and just finished with physical therapy, spouse present, she was agreeable to complete HEP education, pt provided with handouts with education on how to safely perform exercises. Pt demonstrated each exercise x10 reps to show competency requiring min vc for proper technique. Pt used FWW at close supervision for functional mobility requiring no cues for safety or technique. Pt in chair with all needs and ice pack donned to R hip.  Activity Tolerance: Patient limited by pain  Discharge Recommendations: Continue to assess pending progress;Home with Home health OT      Plan   Occupational Therapy Plan  Times Per Week: 4-7  Times Per Day: Once a day  Current Treatment Recommendations: Strengthening;Balance training;Functional mobility training;Endurance training;Safety education & training;Patient/Caregiver education & training;Self-Care / ADL;Home management training;Equipment evaluation, education, & procurement;Positioning     Restrictions   WBAT RLE    Subjective   Subjective  Subjective: \"I know I will keep exercising at the house.\"  Pain: 6/10 R hip  Orientation  Overall Orientation Status: Within Functional Limits  Orientation Level: Oriented X4  Pain: 6/10 R hip  Cognition  Overall Cognitive Status: WFL  Cognition

## 2024-08-09 NOTE — PROGRESS NOTES
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 Diagnoses of Post-op pain, Acute blood loss anemia, Ataxia, Central vestibular vertigo, Dizziness, Acute pain due to trauma, QT prolongation, SAH (subarachnoid hemorrhage) (HCC), Subdural hematoma (HCC), and Cerebral contusion and laceration (HCC) were also pertinent to this visit.    Assessment   Assessment: (P) pt tolerated seated exercises with VC's for proper form, AAROm on RLE for 2 Ther Ex and gravity reduced position for hip ABD/ADD. Pt instructed on safety with use of rollator prior to using with good pt understanding and demo. pt demonstarted short stride lengths with VC's to correct with P-F follow through. Pt showed f endurance, not requiring a RB between gait trials.  Activity Tolerance: (P) Patient tolerated treatment well     Plan    Physical Therapy Plan  General Plan: (P) 5-7 times per week  Current Treatment Recommendations: (P) Strengthening;ROM;Balance training;Functional mobility training;Transfer training;Gait training;Stair training;Neuromuscular re-education;Pain management;Home exercise program;Safety education & training;Patient/Caregiver education & training;Equipment evaluation, education, & procurement;Modalities;Positioning;Therapeutic activities;Endurance training;Wheelchair mobility training     Restrictions  Restrictions/Precautions  Restrictions/Precautions: Weight Bearing  Implants present? : Metal implants (R hip hemiarthoplasty)  Lower Extremity Weight Bearing Restrictions  Right Lower Extremity Weight Bearing: Weight Bearing As Tolerated     Subjective    Subjective  Subjective: (P) Pt in

## 2024-08-09 NOTE — PROGRESS NOTES
Comprehensive Nutrition Assessment    Type and Reason for Visit:  Reassess    Nutrition Recommendations/Plan:   Continue soft/bite size diet.   PO >75% Meals  Continue ONS as ordered     Malnutrition Assessment:  Malnutrition Status:  No malnutrition (07/31/24 1255)    Context:  Acute Illness     Findings of the 6 clinical characteristics of malnutrition:  Energy Intake:  Mild decrease in energy intake (Comment)  Weight Loss:  No significant weight loss     Body Fat Loss:  No significant body fat loss     Muscle Mass Loss:  No significant muscle mass loss    Fluid Accumulation:  No significant fluid accumulation     Strength:  Not Performed    Nutrition Assessment:    Nutritional status appears to be improving. Pt reports appetite is better, and is accepting supplements as ordered. No S/s of malnutrition noted. Pt out of bed at time of assessment. Will request new weight. continue current nutritional plan of care. Pt feels swallowing has improved from post op- was requesting fresh fruit but wanted to maintain current diet (likes cut up meat, etc).    Nutrition Related Findings:    Admit to subacute rehab related to right femur fracture. \"The patient is a 68 y.o. female with PMH of hypertension and depression. Patient tripped on her cat. She presented with right hip pain and found to have fracture. She underwent repair and the subsequently recommended to be admitted to skilled unit for inpatient physical and Occupational Therapy. Patient is able to provide basic information. Unable to engage in complex conversation. \" Admit weight 143#, within UBWR per EMR weight history. Current weight 136.6# on 8/6.  Weight down from admission but remains within a UBWR per EMR review. Diet is disphagia soft/bite size. ONS in place all meals, (standard high calorie/high protein and frozen ONS). PO % meals, improved from admission. Meds reviewed, surgical incision to right femur. Labs reviewed 8/8- no nutrition related  RD

## 2024-08-09 NOTE — PROGRESS NOTES
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 Diagnoses of Post-op pain, Acute blood loss anemia, Ataxia, Central vestibular vertigo, Dizziness, Acute pain due to trauma, QT prolongation, SAH (subarachnoid hemorrhage) (HCC), Subdural hematoma (HCC), and Cerebral contusion and laceration (HCC) were also pertinent to this visit.    Assessment   Assessment: (P) Car transfer demo'd for pt  with trials x 2 with Vc'ing and SBA/CGA for safety. Stair training with pt remembering correct sequencing well. Seated ther ex to increase B LE strength w/ VC's for proper form. Pt demo's +1 pitted adema on RLE and had increased R LE pain at 6-7/10 with ice brought to pt, feet elevated and session ended.  Activity Tolerance: (P) Patient tolerated treatment well     Plan    Physical Therapy Plan  General Plan: (P) 5-7 times per week  Current Treatment Recommendations: (P) Strengthening;ROM;Balance training;Functional mobility training;Transfer training;Gait training;Stair training;Neuromuscular re-education;Pain management;Home exercise program;Safety education & training;Patient/Caregiver education & training;Equipment evaluation, education, & procurement;Modalities;Positioning;Therapeutic activities;Endurance training;Wheelchair mobility training     Restrictions  Restrictions/Precautions  Restrictions/Precautions: Weight Bearing  Implants present? : Metal implants (R hip hemiarthoplasty)  Lower Extremity Weight Bearing Restrictions  Right Lower Extremity Weight Bearing: Weight Bearing As Tolerated     Subjective    Subjective  Subjective: (P) Pt in room chair and agreeable to therapy.      Objective   Vitals     Bed Mobility Training  Bed Mobility Training: (P) No  Balance  Sitting: (P) Intact  Sitting - Static: (P) Good (unsupported)  Sitting - Dynamic: (P) Occasional  Standing: (P) Intact;With support  Standing - Static: (P) Fair  Standing - Dynamic: (P) Fair  Transfer Training  Transfer Training: (P) Yes  Overall Level of Assistance: (P) Stand-by assistance;Supervision  Interventions: (P) Safety awareness training;Verbal cues  Sit to Stand: (P) Stand-by assistance;Supervision  Stand to Sit: (P) Supervision;Stand-by assistance  Stand Pivot Transfers: (P) Contact-guard assistance  Bed to Chair: (P) Stand-by assistance  Gait Training  Gait Training: (P) Yes  Gait  Gait Training: (P) Yes  Overall Level of Assistance: (P) Stand-by assistance  Distance (ft):  (75' x 2)  Assistive Device: Walker, rollator  Interventions: Verbal cues  Base of Support: Narrowed  Speed/Lakesha: Slow  Step Length: Left shortened;Right shortened  Swing Pattern: Right asymmetrical;Left asymmetrical  Stance: Right decreased  Gait Abnormalities: Decreased step clearance  Rail Use: (P) Both  Stairs - Level of Assistance: (P) Stand-by assistance  Number of Stairs Trained: (P)  (2, 6\" steps to ascend/descend non reciprocal pattern.)     PT Exercises  A/AROM Exercises: (P) seated Ther Ex: reclined AP's x20, reclined GS/QS x10, 5 sec h             Goals  Short Term Goals  Time Frame for Short Term Goals: 1 week LUTHER  Short Term Goal 1: Min A for bed mobility  Short Term Goal 2: CGA with transfers  Short Term Goal 3: Pt able to ambulate with AD for 50' x 1 with CGA and safe technique  Short Term Goal 4: Pt able to ambulate up/down 1 steps with CGA  Short Term Goal 5: Pt able to tolerate 10 reps of B LE ther ex  Long Term Goals  Time Frame for Long Term Goals : 2 weeks LUTHER  Long Term Goal 1: Pt will demonstrate bed mobility indep  Long Term Goal 2: Pt will demonstrate transfers indep  Long Term Goal 3: Pt able to ambulate with AD for

## 2024-08-09 NOTE — PROGRESS NOTES
Chart reviewed.  Patient's care discussed in daily quality rounds.  Patient continues to receive skilled therapies at [facility].  [facility] RN House supervisor reports that insurance next review date is Monday 8/12/24.  Therapy recommending [facility] upon DC.  [facility] identified and arranged to follow patient upon DC.    This  met with patient and spouse for on-going DC planning and to provide update on insurance next review date.  Patient and spouse report feeling relieved that insurance has approved more time for patient to work with therapy at [facility].  Patient and spouse report feeling more confident with patient DC home on Wed. 8/14 with [facility] following.  This  contacted [facility] liaison Cintia to provide update with new tentative DC date of 8/14/24.  Cintia confirms that [facility] can follow patient upon DC.

## 2024-08-10 PROCEDURE — 97116 GAIT TRAINING THERAPY: CPT

## 2024-08-10 PROCEDURE — 6360000002 HC RX W HCPCS: Performed by: PHYSICIAN ASSISTANT

## 2024-08-10 PROCEDURE — 6370000000 HC RX 637 (ALT 250 FOR IP): Performed by: INTERNAL MEDICINE

## 2024-08-10 PROCEDURE — 1200000002 HC SEMI PRIVATE SWING BED

## 2024-08-10 PROCEDURE — 6370000000 HC RX 637 (ALT 250 FOR IP): Performed by: PHYSICIAN ASSISTANT

## 2024-08-10 PROCEDURE — 97110 THERAPEUTIC EXERCISES: CPT

## 2024-08-10 PROCEDURE — 97535 SELF CARE MNGMENT TRAINING: CPT

## 2024-08-10 RX ADMIN — AMITRIPTYLINE HYDROCHLORIDE 25 MG: 25 TABLET, FILM COATED ORAL at 20:33

## 2024-08-10 RX ADMIN — ENOXAPARIN SODIUM 30 MG: 100 INJECTION SUBCUTANEOUS at 20:32

## 2024-08-10 RX ADMIN — ATORVASTATIN CALCIUM 20 MG: 10 TABLET, FILM COATED ORAL at 07:31

## 2024-08-10 RX ADMIN — ENOXAPARIN SODIUM 30 MG: 100 INJECTION SUBCUTANEOUS at 07:31

## 2024-08-10 RX ADMIN — LISINOPRIL 5 MG: 5 TABLET ORAL at 07:31

## 2024-08-10 RX ADMIN — SENNOSIDES 8.6 MG: 8.6 TABLET, FILM COATED ORAL at 20:33

## 2024-08-10 RX ADMIN — SENNOSIDES 8.6 MG: 8.6 TABLET, FILM COATED ORAL at 07:31

## 2024-08-10 RX ADMIN — GABAPENTIN 100 MG: 100 CAPSULE ORAL at 10:49

## 2024-08-10 RX ADMIN — SENNOSIDES AND DOCUSATE SODIUM 1 TABLET: 50; 8.6 TABLET ORAL at 07:31

## 2024-08-10 RX ADMIN — SENNOSIDES AND DOCUSATE SODIUM 1 TABLET: 50; 8.6 TABLET ORAL at 20:33

## 2024-08-10 RX ADMIN — TRAMADOL HYDROCHLORIDE 100 MG: 50 TABLET, COATED ORAL at 20:33

## 2024-08-10 RX ADMIN — POLYETHYLENE GLYCOL 3350 17 G: 17 POWDER, FOR SOLUTION ORAL at 07:31

## 2024-08-10 RX ADMIN — BUPROPION HYDROCHLORIDE 300 MG: 150 TABLET, EXTENDED RELEASE ORAL at 07:31

## 2024-08-10 RX ADMIN — GABAPENTIN 100 MG: 100 CAPSULE ORAL at 20:33

## 2024-08-10 ASSESSMENT — PAIN DESCRIPTION - LOCATION: LOCATION: HIP

## 2024-08-10 ASSESSMENT — PAIN - FUNCTIONAL ASSESSMENT: PAIN_FUNCTIONAL_ASSESSMENT: ACTIVITIES ARE NOT PREVENTED

## 2024-08-10 ASSESSMENT — PAIN DESCRIPTION - ORIENTATION: ORIENTATION: RIGHT

## 2024-08-10 ASSESSMENT — PAIN DESCRIPTION - DESCRIPTORS: DESCRIPTORS: ACHING

## 2024-08-10 ASSESSMENT — PAIN SCALES - GENERAL: PAINLEVEL_OUTOF10: 7

## 2024-08-10 NOTE — PROGRESS NOTES
Physical Therapy  Facility/Department: [facility] SURG UNIT  Rehabilitation Physical Therapy Treatment Note    NAME: Kristie Jason  : 1955 (68 y.o.)  MRN: [id number]  CODE STATUS: Full Code    Date of Service: 8/10/24       Restrictions:  Restrictions/Precautions: Weight Bearing  Lower Extremity Weight Bearing Restrictions  Right Lower Extremity Weight Bearing: Weight Bearing As Tolerated     SUBJECTIVE   Pt reports 4/10 leg pain at rest.             OBJECTIVE   Sit to stand and stand to sit  with FWW with Cat./CGA     Ambulation 100ft x 2 with Rollator and CGA, slow to medium pace,   Fair WB through RLE WBAT    Stairs 4 six inch stairs with B rails and Cat     PT Exercises  A/AROM Exercises: Seated knee extension and ankle DF all x 30 reps BLE      ASSESSMENT/PROGRESS TOWARDS GOALS     Patient demonstrated fair performance with Gait activity with good follow thru with safety cueing on stairs with non reciprocal Gait pattern        Goals  Patient Goals   Patient Goals : To be able to move better  Short Term Goals  Time Frame for Short Term Goals: 1 week LUTHER  Short Term Goal 1: Min A for bed mobility  Short Term Goal 2: CGA with transfers  Short Term Goal 3: Pt able to ambulate with AD for 50' x 1 with CGA and safe technique  Short Term Goal 5: Pt able to tolerate 10 reps of B LE ther ex  Long Term Goals  Time Frame for Long Term Goals : 2 weeks LUTHER  Long Term Goal 1: Pt will demonstrate bed mobility indep  Long Term Goal 2: Pt will demonstrate transfers indep  Long Term Goal 4: Pt able to ambulate up/down 3 steps with HR on L and supervision for safe entry into her home  Long Term Goal 5: Pt indep with an advanced HEP to help to progress with pts strength    PLAN OF CARE/SAFETY  Physical Therapy Plan  General Plan: 5-7 times per week  Current Treatment Recommendations: Strengthening;ROM;Balance training;Functional mobility training;Transfer training;Gait training;Stair training;Neuromuscular re-education;Pain

## 2024-08-10 NOTE — PLAN OF CARE
Problem: Safety - Adult  Goal: Free from fall injury  Outcome: Progressing     Problem: Discharge Planning  Goal: Discharge to home or other facility with appropriate resources  Outcome: Progressing     Problem: ABCDS Injury Assessment  Goal: Absence of physical injury  Outcome: Progressing     Problem: Pain  Goal: Verbalizes/displays adequate comfort level or baseline comfort level  Outcome: Progressing     Problem: Musculoskeletal - Adult  Goal: Return mobility to safest level of function  Outcome: Progressing  Goal: Maintain proper alignment of affected body part  Outcome: Progressing  Goal: Return ADL status to a safe level of function  Outcome: Progressing     Problem: Nutrition Deficit:  Goal: Optimize nutritional status  8/9/2024 2333 by Nguyen Paige RN  Outcome: Progressing  8/9/2024 1021 by Tarsha Sequeira RD  Outcome: Progressing  Flowsheets (Taken 7/31/2024 1301)  Nutrient intake appropriate for improving, restoring, or maintaining nutritional needs:   Assess nutritional status and recommend course of action   Recommend appropriate diets, oral nutritional supplements, and vitamin/mineral supplements   Monitor oral intake, labs, and treatment plans

## 2024-08-10 NOTE — PROGRESS NOTES
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 Diagnoses of Post-op pain, Acute blood loss anemia, Ataxia, Central vestibular vertigo, Dizziness, Acute pain due to trauma, QT prolongation, SAH (subarachnoid hemorrhage) (HCC), Subdural hematoma (HCC), and Cerebral contusion and laceration (HCC) were also pertinent to this visit.     Assessment    Assessment: Pt presents in recliner and motivated to complete shower. Educated pt on shower transfer and LE dressing techniques with R LE.  Good applicatin of R LE dressing and shower transfer techniques with improved safety. Improved balance and technique with use of ww during functional mobility and transfers. Improved endurance with dressing  ab bathing activities, including full shower. Pt left with all needs within reach upon RADHA departure.  Activity Tolerance: Patient tolerated treatment well  Discharge Recommendations: Continue to assess pending progress;Home with Home health OT  Other: continue to assess      Plan   Occupational Therapy Plan  Times Per Week: 4-7  Times Per Day: Once a day  Current Treatment Recommendations: Strengthening;Balance training;Functional mobility training;Endurance training;Safety education & training;Patient/Caregiver education & training;Self-Care / ADL;Home management training;Equipment evaluation, education, & procurement;Positioning     Restrictions       Subjective   Subjective  Subjective: \"I would like to take a shower.\" Pt willing to engage int OT treatment           Objective    Vitals     Bed Mobility Training  Bed Mobility Training: No  Overall Level  functional mobility and transfers Mod I  Long Term Goal 5: Demo BUE HEP I  Patient Goals   Patient goals : \"I want to go home.\"    AM-PAC - ADL       Therapy Time   Individual Concurrent Group Co-treatment   Time In 1600         Time Out 1635         Minutes 35                 RADHA Pollard   Occupational Therapy

## 2024-08-10 NOTE — PROGRESS NOTES
Hospitalist Progress Note      PCP: Flor Calderón MD    Date of Admission: 7/30/2024    Chief Complaint: weakness/pain    Subjective: patient in chair, had breakfast     Medications:  Reviewed    Infusion Medications   Scheduled Medications    lisinopril  5 mg Oral Daily    senna  1 tablet Oral BID    amitriptyline  25 mg Oral Nightly    atorvastatin  20 mg Oral Daily    buPROPion  300 mg Oral QAM    enoxaparin  30 mg SubCUTAneous BID    polyethylene glycol  17 g Oral Daily    sennosides-docusate sodium  1 tablet Oral BID     PRN Meds: gabapentin, acetaminophen, bisacodyl, ondansetron **OR** ondansetron, traMADol **OR** traMADol      Intake/Output Summary (Last 24 hours) at 8/10/2024 0836  Last data filed at 8/9/2024 1508  Gross per 24 hour   Intake 300 ml   Output --   Net 300 ml       Exam:    BP (!) 112/57   Pulse 82   Temp 98.2 °F (36.8 °C) (Oral)   Resp 18   Ht 1.67 m (5' 5.75\")   Wt 62 kg (136 lb 9.6 oz)   SpO2 98%   BMI 22.22 kg/m²     General appearance: No apparent distress, appears stated age and cooperative.  HEENT: Pupils equal, round, and reactive to light. Conjunctivae/corneas clear.  Neck: Supple, with full range of motion. No jugular venous distention. Trachea midline.  Respiratory:  Normal respiratory effort. Clear to auscultation, bilaterally without Rales/Wheezes/Rhonchi.  Cardiovascular: Regular rate and rhythm with normal S1/S2 without murmurs, rubs or gallops.  Abdomen: Soft, non-tender, non-distended with normal bowel sounds.  Musculoskeletal: R leg edema   Skin: Skin color, texture, turgor normal.  No rashes or lesions.  Neurologic:  Neurovascularly intact without any focal sensory/motor deficits. Cranial nerves: II-XII intact, grossly non-focal.  Psychiatric: Alert and oriented, thought content appropriate, normal insight  Capillary Refill: Brisk,< 3 seconds   Peripheral Pulses: +2 palpable, equal bilaterally       Labs:   Recent Labs     08/08/24  0611   WBC 9.9   HGB 9.8*   HCT

## 2024-08-10 NOTE — PLAN OF CARE
Problem: Safety - Adult  Goal: Free from fall injury  8/10/2024 1029 by Juan Govea RN  Outcome: Progressing  8/9/2024 2333 by Nguyen Paige RN  Outcome: Progressing     Problem: Discharge Planning  Goal: Discharge to home or other facility with appropriate resources  8/10/2024 1029 by Juan Govea RN  Outcome: Progressing  8/9/2024 2333 by Nguyen Paige RN  Outcome: Progressing     Problem: ABCDS Injury Assessment  Goal: Absence of physical injury  8/10/2024 1029 by Juan Govea RN  Outcome: Progressing  8/9/2024 2333 by Nguyen Paige RN  Outcome: Progressing     Problem: Pain  Goal: Verbalizes/displays adequate comfort level or baseline comfort level  8/10/2024 1029 by Juan Govea RN  Outcome: Progressing  8/9/2024 2333 by Nguyen Paige RN  Outcome: Progressing     Problem: Musculoskeletal - Adult  Goal: Return mobility to safest level of function  8/10/2024 1029 by Juan Govea RN  Outcome: Progressing  8/9/2024 2333 by Nguyen Paige RN  Outcome: Progressing  Goal: Maintain proper alignment of affected body part  8/10/2024 1029 by Juan Govea RN  Outcome: Progressing  8/9/2024 2333 by Nguyen Paige RN  Outcome: Progressing  Goal: Return ADL status to a safe level of function  8/10/2024 1029 by Juan Govea RN  Outcome: Progressing  8/9/2024 2333 by Nguyen Paige RN  Outcome: Progressing     Problem: Nutrition Deficit:  Goal: Optimize nutritional status  8/10/2024 1029 by Juan Govea RN  Outcome: Progressing  8/9/2024 2333 by Nguyen Paige RN  Outcome: Progressing

## 2024-08-11 PROCEDURE — 6360000002 HC RX W HCPCS: Performed by: PHYSICIAN ASSISTANT

## 2024-08-11 PROCEDURE — 97110 THERAPEUTIC EXERCISES: CPT

## 2024-08-11 PROCEDURE — 97535 SELF CARE MNGMENT TRAINING: CPT

## 2024-08-11 PROCEDURE — 6370000000 HC RX 637 (ALT 250 FOR IP): Performed by: INTERNAL MEDICINE

## 2024-08-11 PROCEDURE — 6370000000 HC RX 637 (ALT 250 FOR IP): Performed by: PHYSICIAN ASSISTANT

## 2024-08-11 PROCEDURE — 97530 THERAPEUTIC ACTIVITIES: CPT

## 2024-08-11 PROCEDURE — 97116 GAIT TRAINING THERAPY: CPT

## 2024-08-11 PROCEDURE — 1200000002 HC SEMI PRIVATE SWING BED

## 2024-08-11 RX ADMIN — TRAMADOL HYDROCHLORIDE 50 MG: 50 TABLET, COATED ORAL at 10:17

## 2024-08-11 RX ADMIN — SENNOSIDES AND DOCUSATE SODIUM 1 TABLET: 50; 8.6 TABLET ORAL at 21:16

## 2024-08-11 RX ADMIN — ENOXAPARIN SODIUM 30 MG: 100 INJECTION SUBCUTANEOUS at 07:56

## 2024-08-11 RX ADMIN — ATORVASTATIN CALCIUM 20 MG: 10 TABLET, FILM COATED ORAL at 07:56

## 2024-08-11 RX ADMIN — POLYETHYLENE GLYCOL 3350 17 G: 17 POWDER, FOR SOLUTION ORAL at 07:56

## 2024-08-11 RX ADMIN — ENOXAPARIN SODIUM 30 MG: 100 INJECTION SUBCUTANEOUS at 21:18

## 2024-08-11 RX ADMIN — AMITRIPTYLINE HYDROCHLORIDE 25 MG: 25 TABLET, FILM COATED ORAL at 21:16

## 2024-08-11 RX ADMIN — SENNOSIDES 8.6 MG: 8.6 TABLET, FILM COATED ORAL at 07:56

## 2024-08-11 RX ADMIN — SENNOSIDES 8.6 MG: 8.6 TABLET, FILM COATED ORAL at 21:16

## 2024-08-11 RX ADMIN — BUPROPION HYDROCHLORIDE 300 MG: 150 TABLET, EXTENDED RELEASE ORAL at 07:56

## 2024-08-11 RX ADMIN — SENNOSIDES AND DOCUSATE SODIUM 1 TABLET: 50; 8.6 TABLET ORAL at 07:56

## 2024-08-11 RX ADMIN — TRAMADOL HYDROCHLORIDE 100 MG: 50 TABLET, COATED ORAL at 21:16

## 2024-08-11 ASSESSMENT — PAIN DESCRIPTION - DESCRIPTORS
DESCRIPTORS: ACHING
DESCRIPTORS: ACHING

## 2024-08-11 ASSESSMENT — PAIN SCALES - GENERAL
PAINLEVEL_OUTOF10: 5
PAINLEVEL_OUTOF10: 6
PAINLEVEL_OUTOF10: 7
PAINLEVEL_OUTOF10: 0

## 2024-08-11 ASSESSMENT — PAIN - FUNCTIONAL ASSESSMENT: PAIN_FUNCTIONAL_ASSESSMENT: ACTIVITIES ARE NOT PREVENTED

## 2024-08-11 ASSESSMENT — PAIN DESCRIPTION - LOCATION: LOCATION: HIP;BACK;LEG

## 2024-08-11 ASSESSMENT — PAIN DESCRIPTION - ORIENTATION: ORIENTATION: RIGHT

## 2024-08-11 NOTE — PROGRESS NOTES
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 Diagnoses of Post-op pain, Acute blood loss anemia, Ataxia, Central vestibular vertigo, Dizziness, Acute pain due to trauma, QT prolongation, SAH (subarachnoid hemorrhage) (HCC), Subdural hematoma (HCC), and Cerebral contusion and laceration (HCC) were also pertinent to this visit.    Assessment   Assessment: Patient able to perform functional stepping (side) in bars, somewhat tentatively but performed all well. Good transfer technique, distance improving.  Activity Tolerance: Patient tolerated treatment well     Plan    Physical Therapy Plan  General Plan: 5-7 times per week  Current Treatment Recommendations: Strengthening;ROM;Balance training;Functional mobility training;Transfer training;Gait training;Stair training;Neuromuscular re-education;Pain management;Home exercise program;Safety education & training;Patient/Caregiver education & training;Equipment evaluation, education, & procurement;Modalities;Positioning;Therapeutic activities;Endurance training;Wheelchair mobility training     Restrictions  Restrictions/Precautions  Restrictions/Precautions: Weight Bearing  Implants present? : Metal implants (R hip hemiarthoplasty)  Lower Extremity Weight Bearing Restrictions  Right Lower Extremity Weight Bearing: Weight Bearing As Tolerated     Subjective    Subjective  Subjective: Patient reports that she attempted to get on her side and tweaked her back and it is hurting.     Objective   Vitals     Bed Mobility Training  Bed Mobility Training: No  Balance  Sitting: Intact  Sitting - Static: Good (unsupported)  Sitting - Dynamic:  Occasional  Standing: Intact;With support  Standing - Static: Fair  Standing - Dynamic: Fair  Transfer Training  Transfer Training: Yes  Overall Level of Assistance: Supervision  Interventions: Verbal cues;Safety awareness training  Sit to Stand: Supervision  Stand to Sit: Supervision  Gait Training  Gait Training: Yes  Right Side Weight Bearing: As tolerated  Left Side Weight Bearing: As tolerated  Gait  Gait Training: Yes  Left Side Weight Bearing: As tolerated  Right Side Weight Bearing: As tolerated  Overall Level of Assistance: Stand-by assistance  Assistive Device: Walker, rollator     PT Exercises  A/AROM Exercises: Seated knee extension and ankle DF all x 30 reps BLE  Dynamic Standing Balance Exercises: Holding on to bars, light side step, and retro step x 3 steps             Goals  Short Term Goals  Time Frame for Short Term Goals: 1 week LUTHER  Short Term Goal 1: Min A for bed mobility  Short Term Goal 2: CGA with transfers  Short Term Goal 3: Pt able to ambulate with AD for 50' x 1 with CGA and safe technique  Short Term Goal 4: Pt able to ambulate up/down 1 steps with CGA  Short Term Goal 5: Pt able to tolerate 10 reps of B LE ther ex  Long Term Goals  Time Frame for Long Term Goals : 2 weeks LUTHER  Long Term Goal 1: Pt will demonstrate bed mobility indep  Long Term Goal 2: Pt will demonstrate transfers indep  Long Term Goal 3: Pt able to ambulate with AD for >125'x 1 with Mod indep for safe ambulation at home  Long Term Goal 4: Pt able to ambulate up/down 3 steps with HR on L and supervision for safe entry into her home  Long Term Goal 5: Pt indep with an advanced HEP to help to progress with pts strength  Patient Goals   Patient Goals : To be able to move better    Education  Patient Education  Education Given To: Patient  Education Provided Comments: Importance of consistency  Education Method: Verbal  Barriers to Learning: None  Education Outcome: Verbalized understanding;Demonstrated

## 2024-08-11 NOTE — PROGRESS NOTES
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 Diagnoses of Post-op pain, Acute blood loss anemia, Ataxia, Central vestibular vertigo, Dizziness, Acute pain due to trauma, QT prolongation, SAH (subarachnoid hemorrhage) (HCC), Subdural hematoma (HCC), and Cerebral contusion and laceration (HCC) were also pertinent to this visit.     Assessment    Assessment: Pt presents in recliner and motivated to complete LE dressing, functional mobility and UE ther. ex. Improved balance and technique with use of ww during functional mobility and transfers. Improved endurance with functional endurance and decreased ability to don socks and compression stockings. Completed B/L UE ther. ex in stance and functional mobility circuits to improve endurance. Pt left with all needs within reach upon RADHA departure.  Activity Tolerance: Patient tolerated treatment well  Discharge Recommendations: Continue to assess pending progress;Home with Home health OT  Other: continue to assess      Plan   Occupational Therapy Plan  Times Per Week: 4-7  Times Per Day: Once a day  Current Treatment Recommendations: Strengthening;Balance training;Functional mobility training;Endurance training;Safety education & training;Patient/Caregiver education & training;Self-Care / ADL;Home management training;Equipment evaluation, education, & procurement;Positioning     Restrictions   R hip hemiarthroplasty    Subjective   Subjective  Subjective: I can giv it a try.\" Pt willingto engage in OT treatment           Objective    Bed Mobility Training  Bed Mobility Training: No  Overall Level

## 2024-08-11 NOTE — PLAN OF CARE
Problem: Safety - Adult  Goal: Free from fall injury  8/10/2024 2127 by Nguyen Paige RN  Outcome: Progressing  8/10/2024 1029 by Juan Govea RN  Outcome: Progressing     Problem: Discharge Planning  Goal: Discharge to home or other facility with appropriate resources  8/10/2024 2127 by Nguyen Paige RN  Outcome: Progressing  8/10/2024 1029 by Juan Govea RN  Outcome: Progressing     Problem: ABCDS Injury Assessment  Goal: Absence of physical injury  8/10/2024 2127 by Nguyen Paige RN  Outcome: Progressing  8/10/2024 1029 by Juan Govea RN  Outcome: Progressing     Problem: Pain  Goal: Verbalizes/displays adequate comfort level or baseline comfort level  8/10/2024 2127 by Nguyen Paige RN  Outcome: Progressing  8/10/2024 1029 by Juan Govea RN  Outcome: Progressing     Problem: Musculoskeletal - Adult  Goal: Return mobility to safest level of function  8/10/2024 2127 by Nguyen Paige RN  Outcome: Progressing  8/10/2024 1029 by Juan Govea RN  Outcome: Progressing  Goal: Maintain proper alignment of affected body part  8/10/2024 2127 by Nguyen Paige RN  Outcome: Progressing  8/10/2024 1029 by Juan Govea RN  Outcome: Progressing  Goal: Return ADL status to a safe level of function  8/10/2024 2127 by Nguyen Paige RN  Outcome: Progressing  8/10/2024 1029 by Juan Govea RN  Outcome: Progressing     Problem: Nutrition Deficit:  Goal: Optimize nutritional status  8/10/2024 2127 by Nguyen Paige RN  Outcome: Progressing  8/10/2024 1029 by Juan Govea RN  Outcome: Progressing

## 2024-08-12 LAB
ANION GAP SERPL CALCULATED.3IONS-SCNC: 9 MEQ/L (ref 9–15)
BASOPHILS # BLD: 0 K/UL (ref 0–0.1)
BASOPHILS NFR BLD: 0.4 % (ref 0.1–1.2)
BUN SERPL-MCNC: 19 MG/DL (ref 8–23)
CALCIUM SERPL-MCNC: 8.6 MG/DL (ref 8.5–9.9)
CHLORIDE SERPL-SCNC: 103 MEQ/L (ref 95–107)
CO2 SERPL-SCNC: 26 MEQ/L (ref 20–31)
CREAT SERPL-MCNC: 0.63 MG/DL (ref 0.5–0.9)
EOSINOPHIL # BLD: 0.2 K/UL (ref 0–0.4)
EOSINOPHIL NFR BLD: 1.7 % (ref 0.7–5.8)
ERYTHROCYTE [DISTWIDTH] IN BLOOD BY AUTOMATED COUNT: 13.9 % (ref 11.7–14.4)
GLUCOSE SERPL-MCNC: 110 MG/DL (ref 70–99)
HCT VFR BLD AUTO: 28.7 % (ref 37–47)
HGB BLD-MCNC: 9.3 G/DL (ref 11.2–15.7)
IMM GRANULOCYTES # BLD: 0.1 K/UL
IMM GRANULOCYTES NFR BLD: 0.7 %
LYMPHOCYTES # BLD: 1.4 K/UL (ref 1.2–3.7)
LYMPHOCYTES NFR BLD: 15.4 %
MCH RBC QN AUTO: 30.2 PG (ref 25.6–32.2)
MCHC RBC AUTO-ENTMCNC: 32.4 % (ref 32.2–35.5)
MCV RBC AUTO: 93.2 FL (ref 79.4–94.8)
MONOCYTES # BLD: 1.1 K/UL (ref 0.2–0.9)
MONOCYTES NFR BLD: 11.9 % (ref 4.7–12.5)
NEUTROPHILS # BLD: 6.6 K/UL (ref 1.6–6.1)
NEUTS SEG NFR BLD: 69.9 % (ref 34–71.1)
PLATELET # BLD AUTO: 546 K/UL (ref 182–369)
POTASSIUM SERPL-SCNC: 4 MEQ/L (ref 3.4–4.9)
RBC # BLD AUTO: 3.08 M/UL (ref 3.93–5.22)
SODIUM SERPL-SCNC: 138 MEQ/L (ref 135–144)
WBC # BLD AUTO: 9.4 K/UL (ref 4–10)

## 2024-08-12 PROCEDURE — 6370000000 HC RX 637 (ALT 250 FOR IP): Performed by: PHYSICIAN ASSISTANT

## 2024-08-12 PROCEDURE — 6360000002 HC RX W HCPCS: Performed by: PHYSICIAN ASSISTANT

## 2024-08-12 PROCEDURE — 97535 SELF CARE MNGMENT TRAINING: CPT

## 2024-08-12 PROCEDURE — 97530 THERAPEUTIC ACTIVITIES: CPT

## 2024-08-12 PROCEDURE — 1200000002 HC SEMI PRIVATE SWING BED

## 2024-08-12 PROCEDURE — 6370000000 HC RX 637 (ALT 250 FOR IP): Performed by: INTERNAL MEDICINE

## 2024-08-12 PROCEDURE — 80048 BASIC METABOLIC PNL TOTAL CA: CPT

## 2024-08-12 PROCEDURE — 97116 GAIT TRAINING THERAPY: CPT

## 2024-08-12 PROCEDURE — 85025 COMPLETE CBC W/AUTO DIFF WBC: CPT

## 2024-08-12 PROCEDURE — 97110 THERAPEUTIC EXERCISES: CPT

## 2024-08-12 PROCEDURE — 36415 COLL VENOUS BLD VENIPUNCTURE: CPT

## 2024-08-12 RX ORDER — FUROSEMIDE 20 MG/1
10 TABLET ORAL DAILY
Status: DISCONTINUED | OUTPATIENT
Start: 2024-08-12 | End: 2024-08-14

## 2024-08-12 RX ADMIN — SENNOSIDES AND DOCUSATE SODIUM 1 TABLET: 50; 8.6 TABLET ORAL at 21:15

## 2024-08-12 RX ADMIN — ENOXAPARIN SODIUM 30 MG: 100 INJECTION SUBCUTANEOUS at 08:00

## 2024-08-12 RX ADMIN — TRAMADOL HYDROCHLORIDE 50 MG: 50 TABLET, COATED ORAL at 21:15

## 2024-08-12 RX ADMIN — SENNOSIDES 8.6 MG: 8.6 TABLET, FILM COATED ORAL at 21:15

## 2024-08-12 RX ADMIN — TRAMADOL HYDROCHLORIDE 50 MG: 50 TABLET, COATED ORAL at 08:46

## 2024-08-12 RX ADMIN — ATORVASTATIN CALCIUM 20 MG: 10 TABLET, FILM COATED ORAL at 08:00

## 2024-08-12 RX ADMIN — BUPROPION HYDROCHLORIDE 300 MG: 150 TABLET, EXTENDED RELEASE ORAL at 08:01

## 2024-08-12 RX ADMIN — SENNOSIDES AND DOCUSATE SODIUM 1 TABLET: 50; 8.6 TABLET ORAL at 08:00

## 2024-08-12 RX ADMIN — POLYETHYLENE GLYCOL 3350 17 G: 17 POWDER, FOR SOLUTION ORAL at 08:00

## 2024-08-12 RX ADMIN — ENOXAPARIN SODIUM 30 MG: 100 INJECTION SUBCUTANEOUS at 21:15

## 2024-08-12 RX ADMIN — SENNOSIDES 8.6 MG: 8.6 TABLET, FILM COATED ORAL at 08:00

## 2024-08-12 RX ADMIN — LISINOPRIL 5 MG: 5 TABLET ORAL at 08:00

## 2024-08-12 RX ADMIN — FUROSEMIDE 10 MG: 20 TABLET ORAL at 10:36

## 2024-08-12 RX ADMIN — AMITRIPTYLINE HYDROCHLORIDE 25 MG: 25 TABLET, FILM COATED ORAL at 21:15

## 2024-08-12 ASSESSMENT — PAIN SCALES - GENERAL
PAINLEVEL_OUTOF10: 0
PAINLEVEL_OUTOF10: 0
PAINLEVEL_OUTOF10: 5
PAINLEVEL_OUTOF10: 4
PAINLEVEL_OUTOF10: 0

## 2024-08-12 ASSESSMENT — PAIN DESCRIPTION - DESCRIPTORS: DESCRIPTORS: ACHING

## 2024-08-12 ASSESSMENT — PAIN DESCRIPTION - LOCATION
LOCATION: HEAD
LOCATION: HIP

## 2024-08-12 ASSESSMENT — PAIN SCALES - WONG BAKER: WONGBAKER_NUMERICALRESPONSE: NO HURT

## 2024-08-12 ASSESSMENT — PAIN DESCRIPTION - ORIENTATION: ORIENTATION: RIGHT

## 2024-08-12 NOTE — PROGRESS NOTES
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 Diagnoses of Post-op pain, Acute blood loss anemia, Ataxia, Central vestibular vertigo, Dizziness, Acute pain due to trauma, QT prolongation, SAH (subarachnoid hemorrhage) (HCC), Subdural hematoma (HCC), and Cerebral contusion and laceration (HCC) were also pertinent to this visit.    Assessment   Assessment: Pt performed all transfers Sx1. Pt performed standing LE ther ex alternating LE's to inc tolerance. VC's throughout for inc posture. Pt ambulated with rollator SBAx1 with VC's for larger step length LLE and inc posture. Pt returned to recliner and donned CP to R LE for pain relief. Continue with POC. Call light and chair alarm in place.  Activity Tolerance: Patient tolerated treatment well     Plan    Physical Therapy Plan  General Plan: 5-7 times per week  Days Per Week:  (1-2)  Current Treatment Recommendations: Strengthening;ROM;Balance training;Functional mobility training;Transfer training;Gait training;Stair training;Neuromuscular re-education;Pain management;Home exercise program;Safety education & training;Patient/Caregiver education & training;Equipment evaluation, education, & procurement;Modalities;Positioning;Therapeutic activities;Endurance training;Wheelchair mobility training     Restrictions  Restrictions/Precautions  Restrictions/Precautions: Weight Bearing  Implants present? : Metal implants (R hip hemiarthoplasty)  Lower Extremity Weight Bearing Restrictions  Right Lower Extremity Weight Bearing: Weight Bearing As Tolerated     Subjective    Subjective  Subjective: Pt sitting up in recliner and agreeable to therapy. Pt states  she has no pain seated but standing her pain inc to 3/10.  Pain: 6/10 R hip and 3/10 LBP  Orientation  Overall Orientation Status: Within Functional Limits  Orientation Level: Oriented X4  Cognition  Overall Cognitive Status: WFL  Cognition Comment: Alert, cooperative, pleasant     Objective   Vitals     Bed Mobility Training  Bed Mobility Training: No  Balance  Sitting: Intact  Sitting - Static: Good (unsupported)  Sitting - Dynamic: Occasional  Standing: Intact;With support  Standing - Static: Fair  Standing - Dynamic: Fair  Transfer Training  Transfer Training: Yes  Overall Level of Assistance: Supervision  Interventions: Safety awareness training;Verbal cues  Sit to Stand: Supervision  Stand to Sit: Supervision  Stand Pivot Transfers: Stand-by assistance  Toilet Transfer: Supervision  Gait Training  Gait Training: Yes  Right Side Weight Bearing: As tolerated  Left Side Weight Bearing: As tolerated  Gait  Gait Training: Yes  Left Side Weight Bearing: As tolerated  Right Side Weight Bearing: As tolerated  Overall Level of Assistance: Stand-by assistance  Distance (ft):  (150' x 2)  Assistive Device: Walker, rollator  Interventions: Verbal cues  Base of Support: Narrowed  Speed/Lakesha: Slow  Step Length: Left shortened;Right shortened  Swing Pattern: Right asymmetrical;Left asymmetrical  Stance: Right decreased    PT Exercises  Exercise Treatment: standing heel/toe raises x 10, standing hip abduction x 10 alt BLE's, standing hip extension alt LE's x 10, standing HS curl x 10 alternating, standing mini squat x 10  A/AROM Exercises: Seated Ther Ex B LE's: LAQ's, marches, hip ABD/ADD x10 each, AP's x20. VC's for proper form             Goals  Short Term Goals  Time Frame for Short Term Goals: 1 week LUTHER  Short Term Goal 1: Min A for bed mobility  Short Term Goal 2: CGA with transfers  Short Term Goal 3: Pt able to ambulate with AD for 50' x 1 with CGA and safe technique  Short Term Goal 4: Pt able to ambulate up/down

## 2024-08-12 NOTE — PROGRESS NOTES
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 Diagnoses of Post-op pain, Acute blood loss anemia, Ataxia, Central vestibular vertigo, Dizziness, Acute pain due to trauma, QT prolongation, SAH (subarachnoid hemorrhage) (HCC), Subdural hematoma (HCC), and Cerebral contusion and laceration (HCC) were also pertinent to this visit.    Assessment   Assessment: (P) Pt demo'd and verbalized good recall of technique for stair training with only l HR used with both hands. Pt tolerated seated ther ex with some limited ROM on RL for LAQ's and hip ABD/ADD. Pt ambulated extended distances with VC's needing occasionally to widen gait and take bigger steps. Pt continues to need a bit more training with use of rollator to lock/unlock Ad at appropriate time, otherwise rollator seems to be a good fit for pt.  Activity Tolerance: (P) Patient tolerated treatment well     Plan    Physical Therapy Plan  General Plan: (P) 5-7 times per week  Current Treatment Recommendations: (P) Strengthening;ROM;Balance training;Functional mobility training;Transfer training;Gait training;Stair training;Neuromuscular re-education;Pain management;Home exercise program;Safety education & training;Patient/Caregiver education & training;Equipment evaluation, education, & procurement;Modalities;Positioning;Therapeutic activities;Endurance training;Wheelchair mobility training     Restrictions  Restrictions/Precautions  Restrictions/Precautions: Weight Bearing  Implants present? : Metal implants (R hip hemiarthoplasty)  Lower Extremity Weight Bearing Restrictions  Right Lower Extremity Weight Bearing: Weight Bearing

## 2024-08-12 NOTE — PROGRESS NOTES
Assessment complete. Patient up to chair, A&OX4. Incsion intact, staples in place. Medicated with PRN ultram for r hip pain. VSS. Call light within reach.

## 2024-08-12 NOTE — PROGRESS NOTES
Hospitalist Progress Note      PCP: Flor Calderón MD    Date of Admission: 7/30/2024    Chief Complaint: edema    Subjective: patient in chair, had breakfast     Medications:  Reviewed    Infusion Medications   Scheduled Medications    furosemide  10 mg Oral Daily    [Held by provider] lisinopril  5 mg Oral Daily    senna  1 tablet Oral BID    amitriptyline  25 mg Oral Nightly    atorvastatin  20 mg Oral Daily    buPROPion  300 mg Oral QAM    enoxaparin  30 mg SubCUTAneous BID    polyethylene glycol  17 g Oral Daily    sennosides-docusate sodium  1 tablet Oral BID     PRN Meds: gabapentin, acetaminophen, bisacodyl, ondansetron **OR** ondansetron, traMADol **OR** traMADol      Intake/Output Summary (Last 24 hours) at 8/12/2024 0851  Last data filed at 8/11/2024 1301  Gross per 24 hour   Intake 240 ml   Output --   Net 240 ml       Exam:    /62   Pulse 83   Temp 98.1 °F (36.7 °C) (Oral)   Resp 16   Ht 1.67 m (5' 5.75\")   Wt 62 kg (136 lb 9.6 oz)   SpO2 99%   BMI 22.22 kg/m²     General appearance: No apparent distress, appears stated age and cooperative.  HEENT: Pupils equal, round, and reactive to light. Conjunctivae/corneas clear.  Neck: Supple, with full range of motion. No jugular venous distention. Trachea midline.  Respiratory:  Normal respiratory effort. Clear to auscultation, bilaterally without Rales/Wheezes/Rhonchi.  Cardiovascular: Regular rate and rhythm with normal S1/S2 without murmurs, rubs or gallops.  Abdomen: Soft, non-tender, non-distended with normal bowel sounds.  Musculoskeletal: R lower leg edema. Full range of motion without deformity.  Skin: Skin color, texture, turgor normal.  No rashes or lesions.  Neurologic:  Neurovascularly intact without any focal sensory/motor deficits. Cranial nerves: II-XII intact, grossly non-focal.  Psychiatric: Alert and oriented, thought content appropriate, normal insight  Capillary Refill: Brisk,< 3 seconds   Peripheral Pulses: +2 palpable, equal

## 2024-08-12 NOTE — PROGRESS NOTES
Patient expresses dislike and refusal of frozen supplement that is ordered (Magic Cup). Will discontinue. Patient continues to accept standard high calorie high protein supplement as ordered (TID). Continue all other nutritional plan of care. Will follow.

## 2024-08-12 NOTE — PROGRESS NOTES
Facility/Department: 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 Diagnoses of Post-op pain, Acute blood loss anemia, Ataxia, Central vestibular vertigo, Dizziness, Acute pain due to trauma, QT prolongation, SAH (subarachnoid hemorrhage) (HCC), Subdural hematoma (HCC), and Cerebral contusion and laceration (HCC) were also pertinent to this visit.     Assessment    Assessment: Pt presents in recliner, reports having full shower yesterday but is willing to complete grooming and change her shirt today. Pt uses rollator at supervision level for mobility, gaining more independence and fall risk is decreasing. Pt was limited by 6/10 pain in hip and LBP. RLE is swollen and compression stocking was donned by OT. Ice pack and all needs provided upon OT departure.  Activity Tolerance: Patient tolerated treatment well  Discharge Recommendations: Continue to assess pending progress;Home with Home health OT      Plan   Occupational Therapy Plan  Times Per Week: 4-7  Times Per Day: Once a day  Current Treatment Recommendations: Strengthening;Balance training;Functional mobility training;Endurance training;Safety education & training;Patient/Caregiver education & training;Self-Care / ADL;Home management training;Equipment evaluation, education, & procurement;Positioning     Restrictions   RLE WBAT    Subjective   Subjective  Subjective: \"I'm sore right now and my low back hurts.\"  Pain: 6/10 R hip and 3/10 LBP  Orientation  Overall Orientation Status: Within Functional Limits  Orientation Level: Oriented X4  Pain: 6/10 R hip and 3/10 LBP  Cognition  Overall Cognitive Status: WFL  Cognition Comment: Alert, cooperative,  mobility and transfers Mod I  Long Term Goal 5: Demo BUE HEP I  Patient Goals   Patient goals : \"I want to go home.\"      Therapy Time   Individual Concurrent Group Co-treatment   Time In 0835         Time Out 0925         Minutes 50                 Radha Babb, [id number]

## 2024-08-12 NOTE — PROGRESS NOTES
Pt A&Ox3 resting in chair. Assessment and vitals are as charted. Medications given per orders. Pt given ice water per request. Pt states no further needs at this time. Call light and table are within reach. Chair alarm on for safety.

## 2024-08-13 PROCEDURE — 97530 THERAPEUTIC ACTIVITIES: CPT

## 2024-08-13 PROCEDURE — 6360000002 HC RX W HCPCS: Performed by: PHYSICIAN ASSISTANT

## 2024-08-13 PROCEDURE — 6370000000 HC RX 637 (ALT 250 FOR IP): Performed by: INTERNAL MEDICINE

## 2024-08-13 PROCEDURE — 97116 GAIT TRAINING THERAPY: CPT

## 2024-08-13 PROCEDURE — 97535 SELF CARE MNGMENT TRAINING: CPT

## 2024-08-13 PROCEDURE — 1200000002 HC SEMI PRIVATE SWING BED

## 2024-08-13 PROCEDURE — 6370000000 HC RX 637 (ALT 250 FOR IP): Performed by: PHYSICIAN ASSISTANT

## 2024-08-13 PROCEDURE — 97110 THERAPEUTIC EXERCISES: CPT

## 2024-08-13 RX ADMIN — AMITRIPTYLINE HYDROCHLORIDE 25 MG: 25 TABLET, FILM COATED ORAL at 21:39

## 2024-08-13 RX ADMIN — FUROSEMIDE 10 MG: 20 TABLET ORAL at 08:19

## 2024-08-13 RX ADMIN — BUPROPION HYDROCHLORIDE 300 MG: 150 TABLET, EXTENDED RELEASE ORAL at 08:19

## 2024-08-13 RX ADMIN — ENOXAPARIN SODIUM 30 MG: 100 INJECTION SUBCUTANEOUS at 21:38

## 2024-08-13 RX ADMIN — ENOXAPARIN SODIUM 30 MG: 100 INJECTION SUBCUTANEOUS at 08:19

## 2024-08-13 RX ADMIN — SENNOSIDES 8.6 MG: 8.6 TABLET, FILM COATED ORAL at 08:19

## 2024-08-13 RX ADMIN — POLYETHYLENE GLYCOL 3350 17 G: 17 POWDER, FOR SOLUTION ORAL at 08:18

## 2024-08-13 RX ADMIN — TRAMADOL HYDROCHLORIDE 50 MG: 50 TABLET, COATED ORAL at 21:38

## 2024-08-13 RX ADMIN — ATORVASTATIN CALCIUM 20 MG: 10 TABLET, FILM COATED ORAL at 08:19

## 2024-08-13 RX ADMIN — GABAPENTIN 100 MG: 100 CAPSULE ORAL at 23:04

## 2024-08-13 RX ADMIN — SENNOSIDES AND DOCUSATE SODIUM 1 TABLET: 50; 8.6 TABLET ORAL at 08:19

## 2024-08-13 RX ADMIN — SENNOSIDES AND DOCUSATE SODIUM 1 TABLET: 50; 8.6 TABLET ORAL at 21:38

## 2024-08-13 RX ADMIN — SENNOSIDES 8.6 MG: 8.6 TABLET, FILM COATED ORAL at 21:38

## 2024-08-13 RX ADMIN — TRAMADOL HYDROCHLORIDE 50 MG: 50 TABLET, COATED ORAL at 06:17

## 2024-08-13 ASSESSMENT — PAIN DESCRIPTION - ORIENTATION
ORIENTATION: RIGHT

## 2024-08-13 ASSESSMENT — PAIN SCALES - GENERAL
PAINLEVEL_OUTOF10: 5
PAINLEVEL_OUTOF10: 6
PAINLEVEL_OUTOF10: 2
PAINLEVEL_OUTOF10: 6

## 2024-08-13 ASSESSMENT — PAIN DESCRIPTION - LOCATION
LOCATION: HIP
LOCATION: HIP
LOCATION: GROIN
LOCATION: HIP

## 2024-08-13 ASSESSMENT — PAIN DESCRIPTION - DESCRIPTORS
DESCRIPTORS: ACHING
DESCRIPTORS: ACHING;SORE
DESCRIPTORS: ACHING
DESCRIPTORS: ACHING

## 2024-08-13 ASSESSMENT — PAIN DESCRIPTION - PAIN TYPE
TYPE: SURGICAL PAIN
TYPE: SURGICAL PAIN

## 2024-08-13 NOTE — PROGRESS NOTES
Physical Therapy  Facility/Department: 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 Diagnoses of Post-op pain, Acute blood loss anemia, Ataxia, Central vestibular vertigo, Dizziness, Acute pain due to trauma, QT prolongation, SAH (subarachnoid hemorrhage) (HCC), Subdural hematoma (HCC), and Cerebral contusion and laceration (HCC) were also pertinent to this visit.    Assessment   Assessment: (P) Pt had less pain at start of visit at 2/10 R hip. pt continued with supported standing ther ex from HEP with seated RB;s between each exercise. pt tolerated multiple gait trials with standing and seated RB's taken in between to improve endurance. pt continues to work on gait to widen AMADOU, keep forwrad gaze and take larger strides with VC's needed through to correct. Pt required SBA for stair climbing and used L HR only to assimilate for home going needs and needed less VC's for sequencing. Pt showed improvment with transfers, bringing rollator fully to place of destination before parking and locking brakes. Pt tolerated treatment well with R hip increasing to 4/10.  Activity Tolerance: (P) Patient tolerated treatment well     Plan    Physical Therapy Plan  General Plan: (P) 5-7 times per week  Current Treatment Recommendations: (P) Strengthening;ROM;Balance training;Functional mobility training;Transfer training;Gait training;Stair training;Neuromuscular re-education;Pain management;Home exercise program;Safety education & training;Patient/Caregiver education & training;Equipment evaluation, education, & procurement;Modalities;Positioning;Therapeutic activities;Endurance training;Wheelchair mobility training

## 2024-08-13 NOTE — PROGRESS NOTES
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 Diagnoses of Post-op pain, Acute blood loss anemia, Ataxia, Central vestibular vertigo, Dizziness, Acute pain due to trauma, QT prolongation, SAH (subarachnoid hemorrhage) (HCC), Subdural hematoma (HCC), and Cerebral contusion and laceration (HCC) were also pertinent to this visit.    Assessment   Assessment: (P) PT session focused on gait w/ pt demo'ing some improvment of larger stride and wider AMADOU with VC's needed ocassionallyas well as practicing supp stand there Ex for HEP. Pt tolerated supp stand Ther Ex to increase B LE strength and R LE ROM with seated RB' needed between each exercise. Pt tolerated 2 sets of 5 reps for standing exercises with no increased R hip pain. Safety with transfers in regards to parking rollator need some improvment still with much VC's needed to make a complete turn to get in front of chair before parking rollator and locking. Pt tolerated treatment well and continues to progress towards goals. Additional 10 min wait time and assistance as needed at start of session.  Activity Tolerance: (P) Patient tolerated treatment well     Plan    Physical Therapy Plan  General Plan: (P) 5-7 times per week  Current Treatment Recommendations: (P) Strengthening;ROM;Balance training;Functional mobility training;Transfer training;Gait training;Stair training;Neuromuscular re-education;Pain management;Home exercise program;Safety education & training;Patient/Caregiver education & training;Equipment evaluation, education, & procurement;Modalities;Positioning;Therapeutic activities;Endurance training;Wheelchair

## 2024-08-13 NOTE — PROGRESS NOTES
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 Diagnoses of Post-op pain, Acute blood loss anemia, Ataxia, Central vestibular vertigo, Dizziness, Acute pain due to trauma, QT prolongation, SAH (subarachnoid hemorrhage) (HCC), Subdural hematoma (HCC), and Cerebral contusion and laceration (HCC) were also pertinent to this visit.     Assessment    Activity Tolerance: Patient tolerated treatment well    OT follow up:yes  Pt. Stated she has 5/10 pain rigth now. Pt. Was agreeable to OT. Pt. Demonstrated a FM UB activity in sitting position to increase FM skills and coordination at supervision allowing extra time to complete and required RB's. Pt. Tolerated ADLs for a sponge bath at the sink in sitting position. Pt.'s ADL status is listed below. Pt. Demonstrated a variety of transfers from different surfaces at supervision/SBA with vc's for safety. Pt. Tolerated a variety of standing tolerances with rollator for dynamic and static standing with no LOB at CGA/SBA. Pt. Required extra time to complete all tasks due to slow movements and frequent RB's. Answered pt.'s questions and concerns.  Plan   Occupational Therapy Plan  Times Per Week: 4-7  Times Per Day: Once a day  Therapy Duration:  (acute LOS)  Current Treatment Recommendations: Strengthening;Balance training;Functional mobility training;Endurance training;Safety education & training;Patient/Caregiver education & training;Self-Care / ADL;Home management training;Equipment evaluation, education, & procurement;Positioning     Restrictions   RLE WBAT    Subjective      Pt. Was agreeable to OT.        Objective           Sit to Stand: Supervision from different

## 2024-08-13 NOTE — PLAN OF CARE
Problem: Safety - Adult  Goal: Free from fall injury  8/12/2024 2324 by Joselyn Aguilera RN  Outcome: Progressing  8/12/2024 1519 by Adriana Aguilera RN  Outcome: Progressing     Problem: Discharge Planning  Goal: Discharge to home or other facility with appropriate resources  8/12/2024 2324 by Joselyn Aguilera RN  Outcome: Progressing  8/12/2024 1519 by Adriana Aguilera RN  Outcome: Progressing     Problem: ABCDS Injury Assessment  Goal: Absence of physical injury  8/12/2024 2324 by Joselyn Aguilera RN  Outcome: Progressing  8/12/2024 1519 by Adriana Aguilera RN  Outcome: Progressing     Problem: Pain  Goal: Verbalizes/displays adequate comfort level or baseline comfort level  8/12/2024 2324 by Joselyn Aguilera RN  Outcome: Progressing  8/12/2024 1519 by Adriana Aguilera RN  Outcome: Progressing     Problem: Musculoskeletal - Adult  Goal: Return mobility to safest level of function  8/12/2024 2324 by Joselyn Aguilera RN  Outcome: Progressing  8/12/2024 1519 by Adriana Aguilera RN  Outcome: Progressing  Goal: Maintain proper alignment of affected body part  8/12/2024 2324 by Joselyn Aguilera RN  Outcome: Progressing  8/12/2024 1519 by Adriana Aguilera RN  Outcome: Progressing  Goal: Return ADL status to a safe level of function  8/12/2024 2324 by Joselyn Aguilera RN  Outcome: Progressing  8/12/2024 1519 by Adriana Aguilera RN  Outcome: Progressing     Problem: Nutrition Deficit:  Goal: Optimize nutritional status  8/12/2024 2324 by Joselyn Aguilera RN  Outcome: Progressing  8/12/2024 1519 by Adriana Aguilera RN  Outcome: Progressing

## 2024-08-13 NOTE — PROGRESS NOTES
Chart reviewed.  Patient continues to receive skilled therapies at [facility].  Patient's care discussed in daily quality rounds.  Patient is reported to be participating and making progress.  Therapy recommending HHC and prn assist upon DC.  [facility] has been arranged to follow patient as requested.  Patient's tentative DC date is 8/14/24.  [facility] RN House supervisor reports that insurance was updated on 8/12 as required however insurance has not yet issued notice of non-coverage or NRD. Therapy reports being agreeable to 8/14 DC date should patient desire not to wait for insurance.  This  met with patient for on-going DC planning.  Reviewed above.  Patient reports that she and family have been planning on 8/14 for DC and desires to keep 8/14 as DC date and not wait for insurance to issue cut letter.  Patient reports that spouse plans to transport patient home on Wed. 8/14 at 1pm.    This  contacted [facility] liaison Mirela to confirm DC plans.  Mirela confirms that [facility] plans to follow and aware of 8/14 DC date.  No further follow up from [facility] SS requested

## 2024-08-14 VITALS
HEART RATE: 97 BPM | BODY MASS INDEX: 21.95 KG/M2 | OXYGEN SATURATION: 98 % | RESPIRATION RATE: 18 BRPM | SYSTOLIC BLOOD PRESSURE: 141 MMHG | TEMPERATURE: 97.7 F | HEIGHT: 66 IN | DIASTOLIC BLOOD PRESSURE: 79 MMHG | WEIGHT: 136.6 LBS

## 2024-08-14 PROCEDURE — 6360000002 HC RX W HCPCS: Performed by: PHYSICIAN ASSISTANT

## 2024-08-14 PROCEDURE — 97110 THERAPEUTIC EXERCISES: CPT

## 2024-08-14 PROCEDURE — 6370000000 HC RX 637 (ALT 250 FOR IP): Performed by: INTERNAL MEDICINE

## 2024-08-14 PROCEDURE — 97530 THERAPEUTIC ACTIVITIES: CPT

## 2024-08-14 PROCEDURE — 97116 GAIT TRAINING THERAPY: CPT

## 2024-08-14 PROCEDURE — 97535 SELF CARE MNGMENT TRAINING: CPT

## 2024-08-14 PROCEDURE — 6370000000 HC RX 637 (ALT 250 FOR IP): Performed by: PHYSICIAN ASSISTANT

## 2024-08-14 RX ORDER — TRAMADOL HYDROCHLORIDE 50 MG/1
50 TABLET ORAL EVERY 6 HOURS PRN
Qty: 15 TABLET | Refills: 0 | Status: SHIPPED | OUTPATIENT
Start: 2024-08-14 | End: 2024-08-19

## 2024-08-14 RX ORDER — GABAPENTIN 100 MG/1
100 CAPSULE ORAL 3 TIMES DAILY PRN
Qty: 30 CAPSULE | Refills: 0 | Status: SHIPPED | OUTPATIENT
Start: 2024-08-14 | End: 2024-08-24

## 2024-08-14 RX ORDER — FUROSEMIDE 20 MG/1
20 TABLET ORAL DAILY
Status: DISCONTINUED | OUTPATIENT
Start: 2024-08-14 | End: 2024-08-14 | Stop reason: HOSPADM

## 2024-08-14 RX ORDER — FUROSEMIDE 20 MG/1
20 TABLET ORAL DAILY
Qty: 60 TABLET | Refills: 3 | Status: SHIPPED | OUTPATIENT
Start: 2024-08-14

## 2024-08-14 RX ADMIN — SENNOSIDES 8.6 MG: 8.6 TABLET, FILM COATED ORAL at 08:43

## 2024-08-14 RX ADMIN — SENNOSIDES AND DOCUSATE SODIUM 1 TABLET: 50; 8.6 TABLET ORAL at 08:43

## 2024-08-14 RX ADMIN — GABAPENTIN 100 MG: 100 CAPSULE ORAL at 08:47

## 2024-08-14 RX ADMIN — ATORVASTATIN CALCIUM 20 MG: 10 TABLET, FILM COATED ORAL at 08:43

## 2024-08-14 RX ADMIN — ENOXAPARIN SODIUM 30 MG: 100 INJECTION SUBCUTANEOUS at 08:52

## 2024-08-14 RX ADMIN — FUROSEMIDE 20 MG: 20 TABLET ORAL at 08:43

## 2024-08-14 RX ADMIN — POLYETHYLENE GLYCOL 3350 17 G: 17 POWDER, FOR SOLUTION ORAL at 08:43

## 2024-08-14 RX ADMIN — BUPROPION HYDROCHLORIDE 300 MG: 150 TABLET, EXTENDED RELEASE ORAL at 08:43

## 2024-08-14 RX ADMIN — TRAMADOL HYDROCHLORIDE 50 MG: 50 TABLET, COATED ORAL at 08:52

## 2024-08-14 ASSESSMENT — PAIN SCALES - GENERAL: PAINLEVEL_OUTOF10: 0

## 2024-08-14 NOTE — PROGRESS NOTES
Chart reviewed.  Patient's care discussed in daily quality rounds.  Plan remains for patient to DC home on this date with [facility] to follow.  Patient's spouse is reported to plan to transport patient home at 1pm today.  This  followed up with [facility] liaison Mirela via phone.  iMrela confirms [facility] is aware of DC today and plans to follow patient.  MANA completed. No further follow up from [facility] requested

## 2024-08-14 NOTE — PROGRESS NOTES
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 Diagnoses of Post-op pain, Acute blood loss anemia, Ataxia, Central vestibular vertigo, Dizziness, Acute pain due to trauma, QT prolongation, SAH (subarachnoid hemorrhage) (HCC), Subdural hematoma (HCC), and Cerebral contusion and laceration (HCC) were also pertinent to this visit.     Assessment      OT follow up:yes  Pt. Tolerated ADLs for finishing getting ready to go home. Trained and educated pt. In HEP. Gave pt. Handout for HEP. Pt. Moves slowly and required extra time to complete tasks at hand. Pt. Required vc's for attention to task due to she gets distracted easily. Trained and educated pt. In home management skills. Gave recommendations for safety and easier techniques for home. Pt. Tolerated a variety of standing tolerances for different standing times for static and dynamic standing with ww. Answered pt.'s questions and concerns.     Plan       Physical Therapy Plan  General Plan: 5-7 times per week (1-2x per day)  Current Treatment Recommendations: Strengthening;ROM;Balance training;Functional mobility training;Transfer training;Gait training;Stair training;Neuromuscular re-education;Pain management;Home exercise program;Safety education & training;Patient/Caregiver education & training;Equipment evaluation, education, & procurement;Modalities;Positioning;Therapeutic activities;Endurance training;Wheelchair mobility training  Additional Comments: pt to discharge home with PRN A of spouse and HHC today 24       Restrictions   Restrictions/Precautions  Restrictions/Precautions: Weight Bearing  Implants present? : Metal implants (R hip  hemiarthoplasty)  Lower Extremity Weight Bearing Restrictions  Right Lower Extremity Weight Bearing: Weight Bearing As Tolerated        Subjective   Pt. Is going home today.           Objective           ADL  Grooming: Supervision (brushing teeth after s/u)  Applying deodorant   UE Dressing: Modified independent -donning shirt  LE Dressing: Supervision;Increased time to complete;Adaptive equipment  Toileting: Supervision (allowing extra time)  Toileting- urination supervision allowing extra time to complete  Transfers from different surfaces-supervision 6 x   Pt. Tolerated standing for 4 minutes 2 x, 7 minutes 2 x, 2 minutes 2 x for dynamic and static standing with ww  OT Exercises  Exercise Treatment: Trained and educated pt. in HEP. Gave pt. handout for HEP.       Trained and educated pt. In home management skills and gave recommendations         Goals  Short Term Goals  Time Frame for Short Term Goals: 5-7 days  Short Term Goal 1: Doff/rylee UB/LB clothing CGA  Short Term Goal 2: Bathe UB/LB CGA  Short Term Goal 3: Complete toileting CGA  Short Term Goal 4: Complete functional mobility and transfers CGA  Short Term Goal 5: Demo BUE HEP with min vc  Long Term Goals  Time Frame for Long Term Goals : 10-14 days  Long Term Goal 1: Doff/rylee UB/LB clothing Mod I  Long Term Goal 2: Bathe UB/LB Mod I  Long Term Goal 3: Complete toileting Mod I  Long Term Goal 4: Complete functional mobility and transfers Mod I  Long Term Goal 5: Demo BUE HEP I  Patient Goals   Patient goals : \"I want to go home.\"    AM-PAC - ADL       Therapy Time   Individual Concurrent Group Co-treatment   Time In  11:05 am         Time Out  12:05 pm          Minutes  60                 SALVADOR Singh/CASTILLO  [id number]

## 2024-08-14 NOTE — DISCHARGE SUMMARY
32.2 - 35.5 %    RDW 13.3 11.7 - 14.4 %    Platelets 347 182 - 369 K/uL    PLATELET SLIDE REVIEW Adequate     Neutrophils % 54.0 34.0 - 71.1 %    Immature Granulocytes % 2.1 %    Lymphocytes % 17.0 %    Monocytes % 26.0 (H) 4.7 - 12.5 %    Eosinophils % 2.4 0.7 - 5.8 %    Basophils % 0.6 0.1 - 1.2 %    Neutrophils Absolute 3.8 1.6 - 6.1 K/uL    Immature Granulocytes # 0.1 K/uL    Lymphocytes Absolute 1.1 (L) 1.2 - 3.7 K/uL    Monocytes Absolute 1.7 (H) 0.2 - 0.9 K/uL    Eosinophils Absolute 0.0 0.0 - 0.4 K/uL    Basophils Absolute 0.0 0.0 - 0.1 K/uL    Bands Relative 3 (L) 5 - 11 %   Basic Metabolic Panel   Result Value Ref Range    Sodium 138 135 - 144 mEq/L    Potassium 4.2 3.4 - 4.9 mEq/L    Chloride 104 95 - 107 mEq/L    CO2 25 20 - 31 mEq/L    Anion Gap 9 9 - 15 mEq/L    Glucose 95 70 - 99 mg/dL    BUN 21 8 - 23 mg/dL    Creatinine 0.55 0.50 - 0.90 mg/dL    Est, Glom Filt Rate >90.0 >60    Calcium 8.4 (L) 8.5 - 9.9 mg/dL   CBC with Auto Differential   Result Value Ref Range    WBC 9.9 4.0 - 10.0 K/uL    RBC 3.24 (L) 3.93 - 5.22 M/uL    Hemoglobin 9.8 (L) 11.2 - 15.7 g/dL    Hematocrit 30.3 (L) 37.0 - 47.0 %    MCV 93.5 79.4 - 94.8 fL    MCH 30.2 25.6 - 32.2 pg    MCHC 32.3 32.2 - 35.5 %    RDW 13.6 11.7 - 14.4 %    Platelets 521 (H) 182 - 369 K/uL    Neutrophils % 66.4 34.0 - 71.1 %    Immature Granulocytes % 1.7 %    Lymphocytes % 18.3 %    Monocytes % 10.5 4.7 - 12.5 %    Eosinophils % 2.5 0.7 - 5.8 %    Basophils % 0.6 0.1 - 1.2 %    Neutrophils Absolute 6.5 (H) 1.6 - 6.1 K/uL    Immature Granulocytes # 0.2 K/uL    Lymphocytes Absolute 1.8 1.2 - 3.7 K/uL    Monocytes Absolute 1.0 (H) 0.2 - 0.9 K/uL    Eosinophils Absolute 0.3 0.0 - 0.4 K/uL    Basophils Absolute 0.1 0.0 - 0.1 K/uL   Basic Metabolic Panel   Result Value Ref Range    Sodium 141 135 - 144 mEq/L    Potassium 4.6 3.4 - 4.9 mEq/L    Chloride 106 95 - 107 mEq/L    CO2 26 20 - 31 mEq/L    Anion Gap 9 9 - 15 mEq/L    Glucose 117 (H) 70 - 99 mg/dL     BUN 23 8 - 23 mg/dL    Creatinine 0.63 0.50 - 0.90 mg/dL    Est, Glom Filt Rate >90.0 >60    Calcium 8.8 8.5 - 9.9 mg/dL   CBC with Auto Differential   Result Value Ref Range    WBC 9.4 4.0 - 10.0 K/uL    RBC 3.08 (L) 3.93 - 5.22 M/uL    Hemoglobin 9.3 (L) 11.2 - 15.7 g/dL    Hematocrit 28.7 (L) 37.0 - 47.0 %    MCV 93.2 79.4 - 94.8 fL    MCH 30.2 25.6 - 32.2 pg    MCHC 32.4 32.2 - 35.5 %    RDW 13.9 11.7 - 14.4 %    Platelets 546 (H) 182 - 369 K/uL    Neutrophils % 69.9 34.0 - 71.1 %    Immature Granulocytes % 0.7 %    Lymphocytes % 15.4 %    Monocytes % 11.9 4.7 - 12.5 %    Eosinophils % 1.7 0.7 - 5.8 %    Basophils % 0.4 0.1 - 1.2 %    Neutrophils Absolute 6.6 (H) 1.6 - 6.1 K/uL    Immature Granulocytes # 0.1 K/uL    Lymphocytes Absolute 1.4 1.2 - 3.7 K/uL    Monocytes Absolute 1.1 (H) 0.2 - 0.9 K/uL    Eosinophils Absolute 0.2 0.0 - 0.4 K/uL    Basophils Absolute 0.0 0.0 - 0.1 K/uL   Basic Metabolic Panel   Result Value Ref Range    Sodium 138 135 - 144 mEq/L    Potassium 4.0 3.4 - 4.9 mEq/L    Chloride 103 95 - 107 mEq/L    CO2 26 20 - 31 mEq/L    Anion Gap 9 9 - 15 mEq/L    Glucose 110 (H) 70 - 99 mg/dL    BUN 19 8 - 23 mg/dL    Creatinine 0.63 0.50 - 0.90 mg/dL    Est, Glom Filt Rate >90.0 >60    Calcium 8.6 8.5 - 9.9 mg/dL       Diet:  ADULT DIET; Dysphagia - Soft and Bite Sized  ADULT ORAL NUTRITION SUPPLEMENT; Breakfast, Lunch, Dinner; Standard High Calorie/High Protein Oral Supplement    Activity:  Activity as tolerated (Patient may move about with assist as indicated or with supervision.)    Follow-up:  in 1 weeks with Flor Calderón MD,       Disposition: home    Condition: Stable    Time Spent: 60 minutes    Electronically signed by Derrick Gonzalez MD on 8/14/2024 at 7:42 AM    Discharging Hospitalist

## 2024-08-14 NOTE — CARE COORDINATION
08/14/24 1450   [facility] Letter   Notice of Medicare Non-Coverage Letter Not Given? (Post Acute) Patient Initiated Discharge     Patient have been planning on 8/14/24 discharge, back to home with .  They are aware that [employer] has not responded with a LCD.  Patient feels safe to return home today, and initiated her discharge.

## 2024-08-14 NOTE — PROGRESS NOTES
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 Diagnoses of Post-op pain, Acute blood loss anemia, Ataxia, Central vestibular vertigo, Dizziness, Acute pain due to trauma, QT prolongation, SAH (subarachnoid hemorrhage) (HCC), Subdural hematoma (HCC), and Cerebral contusion and laceration (HCC) were also pertinent to this visit.    Assessment   Assessment: Pt agrees to HEP at home, HHC and use of Rollator at home. Pt hapy getting to shower this AM fbefore discharge. AIde to be called by pt to finish up . No further skilled subacute needs. Pt will need PRN A HHC and then OP PTat discharge to have recovery to PLOF. PT agrees to plan and goals     Plan  Discharge to home today    Physical Therapy Plan  General Plan: 5-7 times per week (1-2x per day)  Current Treatment Recommendations: Strengthening;ROM;Balance training;Functional mobility training;Transfer training;Gait training;Stair training;Neuromuscular re-education;Pain management;Home exercise program;Safety education & training;Patient/Caregiver education & training;Equipment evaluation, education, & procurement;Modalities;Positioning;Therapeutic activities;Endurance training;Wheelchair mobility training  Additional Comments: pt to discharge home with PRN A of spouse and HHC today 24     Restrictions  Restrictions/Precautions  Restrictions/Precautions: Weight Bearing  Implants present? : Metal implants (R hip hemiarthoplasty)  Lower Extremity Weight Bearing Restrictions  Right Lower Extremity Weight Bearing: Weight Bearing As Tolerated     Subjective    Subjective  Subjective: \"I would like to take a shower before I go home today if

## 2024-08-23 ENCOUNTER — OFFICE VISIT (OUTPATIENT)
Dept: ORTHOPEDIC SURGERY | Facility: CLINIC | Age: 69
End: 2024-08-23
Payer: MEDICARE

## 2024-08-23 ENCOUNTER — HOSPITAL ENCOUNTER (OUTPATIENT)
Dept: RADIOLOGY | Facility: HOSPITAL | Age: 69
Discharge: HOME | End: 2024-08-23
Payer: MEDICARE

## 2024-08-23 DIAGNOSIS — Z87.81 STATUS POST FRACTURE OF RIGHT HIP: ICD-10-CM

## 2024-08-23 PROCEDURE — 99024 POSTOP FOLLOW-UP VISIT: CPT | Performed by: STUDENT IN AN ORGANIZED HEALTH CARE EDUCATION/TRAINING PROGRAM

## 2024-08-23 PROCEDURE — 73502 X-RAY EXAM HIP UNI 2-3 VIEWS: CPT | Mod: RT

## 2024-08-23 RX ORDER — ASPIRIN 81 MG/1
81 TABLET ORAL 2 TIMES DAILY
Qty: 60 TABLET | Refills: 0 | Status: SHIPPED | OUTPATIENT
Start: 2024-08-23 | End: 2024-09-22

## 2024-08-23 NOTE — PROGRESS NOTES
S/p right hip hemiarthroplasty. Doing well. Denies numbness or tingling.  Discharged from rehab earlier this week.  No longer on blood thinners.  States she was taking Lovenox at the facility.        Physical Examination:  The patient appears to be their stated age, is in no apparent distress, and is oriented x3. The patients mood and affect are appropriate. The patients gait is normal. The examination of the limb in question was performed in comparison to the contralateral limb.    Dressing removed  Incision c/d/I  DF, PF intact  Able to flex and extend knee  SILT S, S, SP, DP, T  Foot wwp    Radiographs  Demonstrate a right hip hemiarthroplasty with appropriate alignment    Assessment:  3 weeks post op    Plan:   Weight bearing status: Weight-bear as tolerated  DVT ppx continued through 6 weeks.  I have called in aspirin 81 twice daily to be completed for an additional month.    Follow up in 1 month    Lorraine Motta MD

## 2024-09-09 DIAGNOSIS — Z87.81 STATUS POST FRACTURE OF RIGHT HIP: ICD-10-CM

## 2024-09-18 ENCOUNTER — HOSPITAL ENCOUNTER (OUTPATIENT)
Dept: PHYSICAL THERAPY | Age: 69
Setting detail: THERAPIES SERIES
Discharge: HOME | End: 2024-09-18
Payer: MEDICARE

## 2024-09-18 PROCEDURE — 97110 THERAPEUTIC EXERCISES: CPT

## 2024-09-18 PROCEDURE — 97162 PT EVAL MOD COMPLEX 30 MIN: CPT

## 2024-09-18 PROCEDURE — 97530 THERAPEUTIC ACTIVITIES: CPT

## 2024-09-18 ASSESSMENT — PAIN DESCRIPTION - ORIENTATION: ORIENTATION: RIGHT;ANTERIOR

## 2024-09-18 ASSESSMENT — PAIN DESCRIPTION - LOCATION: LOCATION: HIP;GROIN

## 2024-09-18 ASSESSMENT — PAIN DESCRIPTION - PAIN TYPE: TYPE: SURGICAL PAIN;ACUTE PAIN

## 2024-09-18 ASSESSMENT — PAIN SCALES - GENERAL: PAINLEVEL_OUTOF10: 4

## 2024-09-18 ASSESSMENT — PAIN DESCRIPTION - DESCRIPTORS: DESCRIPTORS: ACHING;DISCOMFORT;SORE

## 2024-09-20 ENCOUNTER — HOSPITAL ENCOUNTER (OUTPATIENT)
Dept: PHYSICAL THERAPY | Age: 69
Setting detail: THERAPIES SERIES
Discharge: HOME | End: 2024-09-20
Payer: MEDICARE

## 2024-09-20 PROCEDURE — 97140 MANUAL THERAPY 1/> REGIONS: CPT

## 2024-09-20 PROCEDURE — 97110 THERAPEUTIC EXERCISES: CPT

## 2024-09-23 ENCOUNTER — HOSPITAL ENCOUNTER (OUTPATIENT)
Dept: PHYSICAL THERAPY | Age: 69
Setting detail: THERAPIES SERIES
Discharge: HOME | End: 2024-09-23
Payer: MEDICARE

## 2024-09-23 PROCEDURE — 97140 MANUAL THERAPY 1/> REGIONS: CPT

## 2024-09-23 PROCEDURE — 97110 THERAPEUTIC EXERCISES: CPT

## 2024-09-23 PROCEDURE — 97116 GAIT TRAINING THERAPY: CPT

## 2024-09-25 ENCOUNTER — HOSPITAL ENCOUNTER (OUTPATIENT)
Dept: PHYSICAL THERAPY | Age: 69
Setting detail: THERAPIES SERIES
Discharge: HOME | End: 2024-09-25
Payer: MEDICARE

## 2024-09-26 ENCOUNTER — APPOINTMENT (OUTPATIENT)
Dept: PHYSICAL THERAPY | Age: 69
End: 2024-09-26
Payer: MEDICARE

## 2024-10-01 ENCOUNTER — HOSPITAL ENCOUNTER (OUTPATIENT)
Dept: PHYSICAL THERAPY | Age: 69
Setting detail: THERAPIES SERIES
Discharge: HOME | End: 2024-10-01
Payer: MEDICARE

## 2024-10-01 PROCEDURE — 97110 THERAPEUTIC EXERCISES: CPT

## 2024-10-01 PROCEDURE — 97116 GAIT TRAINING THERAPY: CPT

## 2024-10-01 ASSESSMENT — PAIN DESCRIPTION - LOCATION: LOCATION: HIP;GROIN

## 2024-10-01 ASSESSMENT — PAIN DESCRIPTION - PAIN TYPE: TYPE: SURGICAL PAIN

## 2024-10-01 ASSESSMENT — PAIN DESCRIPTION - ORIENTATION: ORIENTATION: RIGHT;ANTERIOR

## 2024-10-01 ASSESSMENT — PAIN SCALES - GENERAL: PAINLEVEL_OUTOF10: 4

## 2024-10-01 ASSESSMENT — PAIN DESCRIPTION - DESCRIPTORS: DESCRIPTORS: ACHING;SORE

## 2024-10-01 NOTE — PROGRESS NOTES
Physical Therapy  Daily Treatment Note  Date: 10/1/2024  Patient Name: Kristie Jason  MRN: [id number]     :   1955    Referring Physician: Kaelyn Walter,* Dr Kaelyn Hinton   PCP: Flor Calderón MD    Medical Diagnosis: No admission diagnoses are documented for this encounter.    Treatment Diagnosis: pain and difficulty walking R hip /groin post fx and R hip hemiarthroplasty      Insurance: Payor: [employer] / Plan: [employer] PPO / Product Type: *No Product type* /   Insurance ID: [id number] - (Medicare Managed)    Subjective:  General  Referring Provider (secondary): Dr Kaelyn Hinton  PT Visit Information  Onset Date: 24  Total # of Visits Approved: 10  Total # of Visits to Date: 4  Plan of Care/Certification Expiration Date: 10/18/24  No Show: 0  Canceled Appointment: 0  Referring Provider (secondary): Dr Kaelyn Hinton  Subjective  Subjective: Pt. states she feels weak today.  Pain mainly R groin 4/10.  Pain Screening  Patient Currently in Pain: Yes  Pain Assessment: 0-10  Pain Level: 4  Pain Type: Surgical pain  Pain Location: Hip, Groin  Pain Orientation: Right, Anterior  Pain Descriptors: Aching, Sore       Treatment Activities:  Exercises:      Treatment Reasoning    Exercise 1: 5 sit to stand to sit with BUE support completed in 23 sec  Exercise 2: supine R SLR  AROM 10 reps, 5 hold, lift 5-6 inches,   vcs for form,  Exercise 3: supine R hip ABD - PT assisted or sheet to keep toes pionting up x 10 reps , 2-3 hold  Exercise 4: bridge x 10 hold 5 sec  Exercise 5: supine hip flexor stretch 60 sec x 2  Exercise 6: supine butterfly stretch hold 30 sec x 3  Exercise 7: supine butterfly stretch x 3 hold 20-30 sec  Exercise 8: standing heel and toe raises x 10 hold 3 sec  Exercise 9: supported standing march x 10 hold 3 sec  Exercise 10: forward and retro walk at delong rail 15' x 3 laps VC for step width  Exercise 11: sidestepping L and

## 2024-10-04 ENCOUNTER — HOSPITAL ENCOUNTER (OUTPATIENT)
Dept: PHYSICAL THERAPY | Age: 69
Setting detail: THERAPIES SERIES
Discharge: HOME | End: 2024-10-04
Payer: MEDICARE

## 2024-10-04 PROCEDURE — 97140 MANUAL THERAPY 1/> REGIONS: CPT

## 2024-10-04 PROCEDURE — 97116 GAIT TRAINING THERAPY: CPT

## 2024-10-04 PROCEDURE — 97110 THERAPEUTIC EXERCISES: CPT

## 2024-10-04 ASSESSMENT — PAIN DESCRIPTION - ORIENTATION: ORIENTATION: RIGHT;ANTERIOR

## 2024-10-04 ASSESSMENT — PAIN SCALES - GENERAL: PAINLEVEL_OUTOF10: 4

## 2024-10-04 ASSESSMENT — PAIN DESCRIPTION - PAIN TYPE: TYPE: SURGICAL PAIN

## 2024-10-04 ASSESSMENT — PAIN DESCRIPTION - LOCATION: LOCATION: HIP;GROIN

## 2024-10-04 ASSESSMENT — PAIN DESCRIPTION - DESCRIPTORS: DESCRIPTORS: SORE;TIGHTNESS

## 2024-10-04 NOTE — PROGRESS NOTES
Decrease modified LEFS form 67% disability to <= 25% disabiltiy to show better wlakign and return to daily life activities, be able to walk her dog.  Long Term Goal 4: Advanced HEP in place for discharge.  Patient Goals   Patient Goals : \"I want to be able to walk again and have less pain in my left hip. \"    Plan:  Physical Therapy Plan  Plan weeks: 4-5 wks or 10 visits ( up to 8-10 wks if needed)  Current Treatment Recommendations: Strengthening, ROM, Balance training, Functional mobility training, Endurance training, Stair training, Gait training, Pain management, Safety education & training, Home exercise program, Patient/Caregiver education & training, Equipment evaluation, education, & procurement, Modalities, Therapeutic activities  Additional Comments: KT tape if appropriate and tolerated       Therapy Time:   Individual Concurrent Group Co-treatment   Time In  1245         Time Out  130         Minutes  45                 Rose Goodrich, PTA

## 2024-10-07 ENCOUNTER — HOSPITAL ENCOUNTER (OUTPATIENT)
Dept: PHYSICAL THERAPY | Age: 69
Setting detail: THERAPIES SERIES
Discharge: HOME | End: 2024-10-07
Payer: MEDICARE

## 2024-10-07 PROCEDURE — 97110 THERAPEUTIC EXERCISES: CPT

## 2024-10-07 PROCEDURE — 97116 GAIT TRAINING THERAPY: CPT

## 2024-10-07 ASSESSMENT — PAIN DESCRIPTION - DESCRIPTORS: DESCRIPTORS: SORE

## 2024-10-07 ASSESSMENT — PAIN DESCRIPTION - ORIENTATION: ORIENTATION: RIGHT

## 2024-10-07 ASSESSMENT — PAIN SCALES - GENERAL: PAINLEVEL_OUTOF10: 3

## 2024-10-07 ASSESSMENT — PAIN DESCRIPTION - PAIN TYPE: TYPE: SURGICAL PAIN

## 2024-10-07 ASSESSMENT — PAIN DESCRIPTION - LOCATION: LOCATION: HIP

## 2024-10-07 NOTE — PROGRESS NOTES
x 2 ea with BUE support lightly.  Exercise 9: supported standing march x 10 hold  5 sec  Exercise 10: step up and over lateral x 10 4\"  Exercise 11: step up forward lead R x 10 4\"    Limitations addressed: Mobility, Strength, Flexibility, Activity tolerance, Pain modulation  Functional ability(s) targeted: Ambulating community distances, Tolerance to age appropriate activities                     Gait: (CPT 50239)  Treatment Reasoning    Gait Training 1: Pt. ambulated 440' x 1 with her Rollator narrow AMADOU VC for wider AMADOU focusing on heel toe pattern and inc step length.  Occ scuffing x 2 and difficulty with wider AMADOU                     Pt Education: Additional Comments: KT tape if appropriate and tolerated       ASSESSMENT     Assessment: Assessment: Pt. had near fall over the weekend and difficulty with confidence with walking and balance.  Pt. also feeling weak and fatigued.  Disucssed drinking more water for hydration and using Rollator when tired or weak.  Pt. was able to advance with standing exercises with VC needed for form, added step ups as well for inc function.  No inc in pain. Walking better with Rollator focuisng on step width, lenght and heel toe pattern.  Body Structures, Functions, Activity Limitations Requiring Skilled Therapeutic Intervention: Decreased functional mobility , Decreased ROM, Decreased strength, Decreased endurance, Increased pain, Decreased posture, Decreased tolerance to work activity    Post-Treatment Pain Level:      Activity Tolerance: Patient tolerated treatment well; Patient limited by pain    Therapy Prognosis: Good       GOALS   Patient Goals : \"I want to be able to walk again and have less pain in my left hip. \"  Short Term Goals Completed by 1-2 wks Current Status Goal Status   Increase AROM R hip any amount for less pain and more functional walking 9/18/24 R hip flex 100deg, abd 20 deg, ext 10 deg from neutral     5 sit to stand to sit in <= 22 seconds 9/18/24 5 sit to

## 2024-10-10 ENCOUNTER — APPOINTMENT (OUTPATIENT)
Dept: ORTHOPEDIC SURGERY | Facility: CLINIC | Age: 69
End: 2024-10-10
Payer: MEDICARE

## 2024-10-10 ENCOUNTER — HOSPITAL ENCOUNTER (OUTPATIENT)
Dept: PHYSICAL THERAPY | Age: 69
Setting detail: THERAPIES SERIES
Discharge: HOME | End: 2024-10-10
Payer: MEDICARE

## 2024-10-10 DIAGNOSIS — Z87.81 STATUS POST FRACTURE OF RIGHT HIP: Primary | ICD-10-CM

## 2024-10-10 PROCEDURE — 97116 GAIT TRAINING THERAPY: CPT

## 2024-10-10 PROCEDURE — 97110 THERAPEUTIC EXERCISES: CPT

## 2024-10-10 PROCEDURE — 99024 POSTOP FOLLOW-UP VISIT: CPT | Performed by: STUDENT IN AN ORGANIZED HEALTH CARE EDUCATION/TRAINING PROGRAM

## 2024-10-10 NOTE — PROGRESS NOTES
Physical Therapy  Physical Therapy: Daily Note   Patient: Kristie Jason (69 y.o. female)   Examination Date: 10/10/2024  Plan of Care/Certification Expiration Date: 10/18/24    No data recorded   :  1955 # of Visits since SOC:   7   MRN: [id number]  CSN: [id number] Start of Care Date:   2024   Insurance: Payor: [employer] / Plan: [employer]MIUM PPO / Product Type: *No Product type* /   Insurance ID: [id number] - (Medicare Managed) Secondary Insurance (if applicable):    Referring Physician: Kaelyn Walter MD Dr Erin Ohliger Upperman   PCP: Flor Calderón MD Visits to Date/Visits Approved:   7/ 10    No Show/Cancelled Appts: 0  /    0   Medical Diagnosis: No admission diagnoses are documented for this encounter.    Treatment Diagnosis: pain and difficulty walking R hip /groin post fx and R hip hemiarthroplasty        SUBJECTIVE EXAMINATION   Pain Level: Pain Screening  Patient Currently in Pain: No    Patient Comments: Subjective: Pt. states she had a follow up with her doctor today.  Pt. states she always feels her balance is off and is going to go for Vestibular testing but not until November.  Pt. states she is also going to Neurologist.  Pt. states her pain is okay just lack of confidence.    HEP Compliance: Good        OBJECTIVE EXAMINATION   Restrictions:  Restrictions/Precautions: Weight Bearing   Required Braces or Orthoses?: No   Implants present? : Metal implants (R hip hemiarthoplasty)                TREATMENT     Exercises:      Treatment Reasoning    Exercise 1: Bridge with hip ABD x 10 hold 3 sec YTB  Exercise 2: [place]leying Hip ABD x 10 VC and tactile cues for form  Exercise 3: Prone Hip ABD x 10 hold 3 sec  Exercise 4: Prone HS curl x 10 hold 3 sec  Exercise 5: SLR x 10 hold 3 sec VC for QS                          Gait: (CPT 15995)  Treatment Reasoning    Gait Training 1: Pt. ambulated 440' x 1 with Rollator with narrow AMADOU and mid foot strike

## 2024-10-10 NOTE — PROGRESS NOTES
S/p right hip hemiarthroplasty 7/30/24. Doing well. Denies numbness or tingling.  Balance issues, having inner ear looked at. Hx of head trauma last year.         Physical Examination:  The patient appears to be their stated age, is in no apparent distress, and is oriented x3. The patients mood and affect are appropriate. The patients gait is normal. The examination of the limb in question was performed in comparison to the contralateral limb.    Ambulates with rollater  DF, PF intact  Able to flex and extend knee  SILT S, S, SP, DP, T  Foot wwp    Radiographs  Demonstrate a right hip hemiarthroplasty with appropriate alignment    Assessment:  2.5 months post op    Plan:   Weight bearing status: Weight-bear as tolerated  Continue activity as tolerated. Follow up in 6 months with XR.    Lorraine Motta MD

## 2024-10-16 ENCOUNTER — HOSPITAL ENCOUNTER (OUTPATIENT)
Dept: PHYSICAL THERAPY | Age: 69
Setting detail: THERAPIES SERIES
Discharge: HOME | End: 2024-10-16
Payer: MEDICARE

## 2024-10-16 PROCEDURE — 97530 THERAPEUTIC ACTIVITIES: CPT

## 2024-10-16 PROCEDURE — 97116 GAIT TRAINING THERAPY: CPT

## 2024-10-16 PROCEDURE — 97140 MANUAL THERAPY 1/> REGIONS: CPT

## 2024-10-16 ASSESSMENT — PAIN DESCRIPTION - PAIN TYPE: TYPE: SURGICAL PAIN

## 2024-10-16 ASSESSMENT — PAIN SCALES - GENERAL: PAINLEVEL_OUTOF10: 3

## 2024-10-16 ASSESSMENT — PAIN DESCRIPTION - DESCRIPTORS: DESCRIPTORS: SORE

## 2024-10-16 ASSESSMENT — PAIN DESCRIPTION - LOCATION: LOCATION: GROIN

## 2024-10-16 ASSESSMENT — PAIN DESCRIPTION - ORIENTATION: ORIENTATION: RIGHT;INNER

## 2024-10-16 NOTE — PROGRESS NOTES
tends to walk narrow and shuffle Partially met, In progress   Pt reporting HEP at least once a day 5/7 days to gain strength , ROM and endurance for functional mobilliy 10/16/24 daily HEP and stretching Met                                                       Long Term Goals Completed by inc to 8-10 weeks at recheck 10/16/24 up to 17 visits Current Status Goal Status   Increase AROM R hip to WFL, and strength in R hip to >= 4+/5 to achieve functional mobility goals. 10/16/24 R hip flex 0-108 deg ( inc 5 deg), abd 0-26 ( inc 6 deg), hip ext 3 from neural ( gain 7 deg); strenth avg hips and knees 4 to 4+/5 Partially met, In progress   Pt able to complete 6 minute walk test with cane or less >= 1000ft with pain in R hip/groin post <= 1/10 per pt report 10/16/24 : 6 minute walk test 620ft with Rollator mild inc step length compared to eval, still dec heelstrike B, more weight in LE than UE; unable to start cane yet, slow pace, midl inc MAADOU- tends to walk narrow and shuffle, pain in groin 3/10 ,recovered to 0/10 pain  in 1 minute of sitting Partially met, In progress   Decrease modified LEFS form 67% disability to <= 25% disabiltiy to show better wlakign and return to daily life activities, be able to walk her dog. 10/16/24 modified LEFS 32/64= 50% disability , not able to walk large dog yet- dog had surgery, doing better with ADL and walking, slow progress per pt report Partially met, In progress   Advanced HEP in place for discharge. 10/16/24 advancing HEP as tolerated , prone work increases LBP Partially met, In progress                                                        TREATMENT PLAN   Plan Frequency: 1-2x week  Plan weeks: 8-10 weeks up to 17 visits  Current Treatment Recommendations: Strengthening, ROM, Balance training, Functional mobility training, Endurance training, Stair training, Gait training, Pain management, Safety education & training, Home exercise program, Patient/Caregiver education & training,

## 2024-10-18 ENCOUNTER — HOSPITAL ENCOUNTER (OUTPATIENT)
Dept: PHYSICAL THERAPY | Age: 69
Setting detail: THERAPIES SERIES
Discharge: HOME | End: 2024-10-18
Payer: MEDICARE

## 2024-10-18 PROCEDURE — 97110 THERAPEUTIC EXERCISES: CPT

## 2024-10-18 PROCEDURE — 97140 MANUAL THERAPY 1/> REGIONS: CPT

## 2024-10-18 ASSESSMENT — PAIN DESCRIPTION - ORIENTATION: ORIENTATION: RIGHT;INNER

## 2024-10-18 ASSESSMENT — PAIN DESCRIPTION - DESCRIPTORS: DESCRIPTORS: SORE

## 2024-10-18 ASSESSMENT — PAIN DESCRIPTION - PAIN TYPE: TYPE: SURGICAL PAIN

## 2024-10-18 ASSESSMENT — PAIN DESCRIPTION - LOCATION: LOCATION: GROIN

## 2024-10-18 ASSESSMENT — PAIN SCALES - GENERAL: PAINLEVEL_OUTOF10: 3

## 2024-10-18 NOTE — PROGRESS NOTES
complete one lap 440 ft with Rollator in <= 4 minutes with any inc in step length and any inc in upright posture,  to be able  to start amb trainging with cane. 6 minute walk test 620ft with Rollator mild inc step length compared to eval, still dec heelstrike B, more weight in LE than UE; unable to start cane yet, slow pace, midl inc AMADOU- tends to walk narrow and shuffle     Pt reporting HEP at least once a day 5/7 days to gain strength , ROM and endurance for functional mobilliy 10/16/24 daily HEP and stretching Met                                                       Long Term Goals Completed by inc to 8-10 weeks at recheck 10/16/24 up to 17 visits Current Status Goal Status   Increase AROM R hip to WFL, and strength in R hip to >= 4+/5 to achieve functional mobility goals. 10/16/24 R hip flex 0-108 deg ( inc 5 deg), abd 0-26 ( inc 6 deg), hip ext 3 from neural ( gain 7 deg); strenth avg hips and knees 4 to 4+/5 Partially met, In progress   Pt able to complete 6 minute walk test with cane or less >= 1000ft with pain in R hip/groin post <= 1/10 per pt report 10/16/24 : 6 minute walk test 620ft with Rollator mild inc step length compared to eval, still dec heelstrike B, more weight in LE than UE; unable to start cane yet, slow pace, midl inc AMADOU- tends to walk narrow and shuffle, pain in groin 3/10 ,recovered to 0/10 pain  in 1 minute of sitting Partially met, In progress   Decrease modified LEFS form 67% disability to <= 25% disabiltiy to show better wlakign and return to daily life activities, be able to walk her dog. 10/16/24 modified LEFS 32/64= 50% disability , not able to walk large dog yet- dog had surgery, doing better with ADL and walking, slow progress per pt report Partially met, In progress   Advanced HEP in place for discharge. 10/16/24 advancing HEP as tolerated , prone work increases LBP Partially met, In progress                                                        TREATMENT PLAN

## 2024-10-22 ENCOUNTER — HOSPITAL ENCOUNTER (OUTPATIENT)
Dept: PHYSICAL THERAPY | Age: 69
Setting detail: THERAPIES SERIES
Discharge: HOME | End: 2024-10-22
Payer: MEDICARE

## 2024-10-22 PROCEDURE — 97110 THERAPEUTIC EXERCISES: CPT

## 2024-10-22 PROCEDURE — 97116 GAIT TRAINING THERAPY: CPT

## 2024-10-22 ASSESSMENT — PAIN DESCRIPTION - LOCATION: LOCATION: GROIN

## 2024-10-22 ASSESSMENT — PAIN DESCRIPTION - DESCRIPTORS: DESCRIPTORS: SORE

## 2024-10-22 ASSESSMENT — PAIN SCALES - GENERAL: PAINLEVEL_OUTOF10: 2

## 2024-10-22 ASSESSMENT — PAIN DESCRIPTION - PAIN TYPE: TYPE: SURGICAL PAIN

## 2024-10-22 ASSESSMENT — PAIN DESCRIPTION - ORIENTATION: ORIENTATION: RIGHT;INNER

## 2024-10-22 NOTE — PROGRESS NOTES
Physical Therapy: Daily Note   Patient: Kristie Jason (69 y.o. female)   Examination Date: 10/22/2024  Plan of Care/Certification Expiration Date: 11/15/24    No data recorded   :  1955 # of Visits since SOC:   10   MRN: [id number]  CSN: [id number] Start of Care Date:   2024   Insurance: Payor: [employer] / Plan: [employer] [place] [employer] PREMIUM PPO / Product Type: *No Product type* /   Insurance ID: [id number] - (Medicare Managed) Secondary Insurance (if applicable):    Referring Physician: Kaelyn Walter MD Dr Erin Ohliger Upperman   PCP: Flor Calderón MD Visits to Date/Visits Approved:     No Show/Cancelled Appts: 0 / 0     Medical Diagnosis: No admission diagnoses are documented for this encounter.    Treatment Diagnosis: pain and difficulty walking R hip /groin post fx and R hip hemiarthroplasty        SUBJECTIVE EXAMINATION   Pain Level: Pain Screening  Patient Currently in Pain: Yes  Pain Assessment: 0-10  Pain Level: 2  Pain Type: Surgical pain  Pain Location: Groin  Pain Orientation: Right, Inner  Pain Descriptors: Sore    Patient Comments: Subjective: Pt states 2/10 R groin pain at arrival.    HEP Compliance: Good  Any changes to allergies, medications, or have you had any medical procedures/ER visits since your last visit?: No     OBJECTIVE EXAMINATION   Restrictions:  Restrictions/Precautions: Weight Bearing   Required Braces or Orthoses?: No   Implants present? : Metal implants (R hip hemiarthoplasty)            TREATMENT     Exercises:      Treatment Reasoning    Exercise 1: Bridge with hip ABD x 10 hold 3 sec YTB  Exercise 4: Prone HS curl x 10 hold 3 sec with YB  Exercise 5: SLR x 10 hold 3 sec VC for QS  Exercise 13: clam shells x 10 YTB  Exercise 14: butterfly stretch x 3 w/ 30 sec h  Exercise 15: attempted prone IR/ER with t-band YTB  Exercise 16: Supine HS stretch w/ starp x 3 w/ 30 sec h  Exercise 17: hip flexor stretch x 3 w/ 30 sec h    Limitations

## 2024-10-30 ENCOUNTER — HOSPITAL ENCOUNTER (OUTPATIENT)
Dept: PHYSICAL THERAPY | Age: 69
Setting detail: THERAPIES SERIES
Discharge: HOME | End: 2024-10-30
Payer: MEDICARE

## 2024-10-30 NOTE — PROGRESS NOTES
Physical Therapy  Physical Therapy: Daily Note   Patient: Kristie Jason (69 y.o. female)   Examination Date: 10/30/2024  Plan of Care/Certification Expiration Date: 11/15/24    No data recorded   :  1955 # of Visits since SOC:   11   MRN: [id number]  CSN: [id number] Start of Care Date:   2024   Insurance: Payor: [employer] / Plan: [employer] PREMIUM PPO / Product Type: *No Product type* /   Insurance ID: [id number] - (Medicare Managed) Secondary Insurance (if applicable):    Referring Physician: Kaelyn Walter MD Dr Erin Ohliger Upperman   PCP: Flor Calderón MD Visits to Date/Visits Approved:     No Show/Cancelled Appts: 0 /      Medical Diagnosis: No admission diagnoses are documented for this encounter.    Treatment Diagnosis: pain and difficulty walking R hip /groin post fx and R hip hemiarthroplasty        Pt. cancelled no reason given.                   Therapy Time  Individual Time In:         Individual Time Out:    Minutes:  0        Electronically signed by Rose Goodrich PTA  on 10/30/2024 at 11:13 AM   POC NOTE

## 2024-11-07 ENCOUNTER — HOSPITAL ENCOUNTER (OUTPATIENT)
Dept: PHYSICAL THERAPY | Age: 69
Setting detail: THERAPIES SERIES
Discharge: HOME | End: 2024-11-07

## 2024-11-07 NOTE — PROGRESS NOTES
Physical Therapy  Physical Therapy: Daily Note   Patient: Kristie Jason (69 y.o. female)   Examination Date: 2024  Plan of Care/Certification Expiration Date: 11/15/24    No data recorded   :  1955 # of Visits since SOC:   11   MRN: [id number]  CSN: [id number] Start of Care Date:   2024   Insurance: Payor: [employer] / Plan: [employer] PREMIUM PPO / Product Type: *No Product type* /   Insurance ID: [id number] - (Medicare Managed) Secondary Insurance (if applicable):    Referring Physician: Kaelyn Walter MD Dr Erin Ohliger Upperman   PCP: Flor Calderón MD Visits to Date/Visits Approved:     No Show/Cancelled Appts: 0 / 2     Medical Diagnosis: No admission diagnoses are documented for this encounter.    Treatment Diagnosis: pain and difficulty walking R hip /groin post fx and R hip hemiarthroplasty         Pt. cancelled unable to make it.        Therapy Time  Individual Time In:         Individual Time Out:    Minutes:  0        Electronically signed by Rose Goodrich PTA  on 2024 at 12:47 PM   POC NOTE

## 2025-04-17 ENCOUNTER — OFFICE VISIT (OUTPATIENT)
Dept: ORTHOPEDIC SURGERY | Facility: CLINIC | Age: 70
End: 2025-04-17
Payer: MEDICARE

## 2025-04-17 ENCOUNTER — HOSPITAL ENCOUNTER (OUTPATIENT)
Dept: RADIOLOGY | Facility: HOSPITAL | Age: 70
Discharge: HOME | End: 2025-04-17
Payer: MEDICARE

## 2025-04-17 ENCOUNTER — APPOINTMENT (OUTPATIENT)
Dept: ORTHOPEDIC SURGERY | Facility: CLINIC | Age: 70
End: 2025-04-17
Payer: MEDICARE

## 2025-04-17 DIAGNOSIS — Z87.81 STATUS POST FRACTURE OF RIGHT HIP: Primary | ICD-10-CM

## 2025-04-17 DIAGNOSIS — Z87.81 STATUS POST FRACTURE OF RIGHT HIP: ICD-10-CM

## 2025-04-17 PROCEDURE — 1159F MED LIST DOCD IN RCRD: CPT | Performed by: STUDENT IN AN ORGANIZED HEALTH CARE EDUCATION/TRAINING PROGRAM

## 2025-04-17 PROCEDURE — 99213 OFFICE O/P EST LOW 20 MIN: CPT | Performed by: STUDENT IN AN ORGANIZED HEALTH CARE EDUCATION/TRAINING PROGRAM

## 2025-04-17 PROCEDURE — 73502 X-RAY EXAM HIP UNI 2-3 VIEWS: CPT | Mod: RT

## 2025-04-17 NOTE — PROGRESS NOTES
History of Present Illness  Status post right hip hemiarthroplasty 7/30/2024.  Doing well.  Denies numbness or tingling.  Patient with persistent balance issues, was recently diagnosed with Parkinson's disease.      Physical Examination:  General: Alert and oriented to person, place, and time.  No acute distress and breathing comfortably: Pleasant and cooperative with examination.  HEENT: Head is normocephalic and atraumatic.  Neck: Supple, no visible swelling.  Cardiovascular: No palpable tachycardia  Lungs: No audible wheezing or labored breathing  Abdomen: Nondistended.  On musculoskeletal examination, ambulates with rollator.  DF, PF intact.  Able to flex extend the knee.  Foot warm and well-perfused        Radiographs  Demonstrate a right hip hemiarthroplasty with appropriate alignment no evidence of hardware migration.    Assessment:  8 months postop    Plan:   Weightbearing status: Weight-bear as tolerated, continue activity as tolerated.  Follow-up as needed.    Lorraine Motta MD

## (undated) DEVICE — SUTURE MONOCRYL STRATAFIX SPRL + SZ 3-0 L18IN ABSRB UD PS-2 SXMP1B107

## (undated) DEVICE — DRAPE,HIP,W/POUCHES,STERILE: Brand: MEDLINE

## (undated) DEVICE — TOTAL HIP: Brand: MEDLINE INDUSTRIES, INC.

## (undated) DEVICE — SUTURE VICRYL SZ 2-0 L36IN ABSRB UD L36MM CT-1 1/2 CIR J945H

## (undated) DEVICE — NEPTUNE E-SEP SMOKE EVACUATION PENCIL, COATED, 70MM BLADE, PUSH BUTTON SWITCH: Brand: NEPTUNE E-SEP

## (undated) DEVICE — TUBING, SUCTION, 9/32" X 12', STRAIGHT: Brand: MEDLINE INDUSTRIES, INC.

## (undated) DEVICE — BLADE SAW SAG 80X21X0.89 MM OFFSET TOOTH FOR LG BNE

## (undated) DEVICE — CEMENT MIXING SYSTEM WITH FEMORAL BREAKWAY NOZZLE: Brand: REVOLUTION

## (undated) DEVICE — STAPLER SKIN H3.9MM WIRE DIA0.58MM CRWN 6.9MM 35 STPL FIX

## (undated) DEVICE — SUTURE ABSORBABLE ANTIBACT 1-0 CT-1 24 IN STRATAFIX PDS + SXPP1A443

## (undated) DEVICE — CEMENT BNE 20ML 40GM FULL DOSE PMMA W/O ANTIBIO M VISC: Type: IMPLANTABLE DEVICE | Site: HIP | Status: NON-FUNCTIONAL

## (undated) DEVICE — ADVANCED FEMORAL PRESSURIZER, MEDIUM

## (undated) DEVICE — SUTURE MONOCRYL SZ 4-0 L27IN ABSRB UD L19MM PS-2 1/2 CIR PRIM Y426H

## (undated) DEVICE — GLOVE SURG SZ 65 THK91MIL LTX FREE SYN POLYISOPRENE

## (undated) DEVICE — DRESSING HYDROFIBER AQUACEL AG ADVANTAGE 3.5X10 IN

## (undated) DEVICE — SINGLE PORT MANIFOLD: Brand: NEPTUNE 2